# Patient Record
Sex: FEMALE | Race: WHITE | NOT HISPANIC OR LATINO | Employment: UNEMPLOYED | URBAN - NONMETROPOLITAN AREA
[De-identification: names, ages, dates, MRNs, and addresses within clinical notes are randomized per-mention and may not be internally consistent; named-entity substitution may affect disease eponyms.]

---

## 2017-01-03 ENCOUNTER — OFFICE VISIT (OUTPATIENT)
Dept: OBSTETRICS AND GYNECOLOGY | Facility: CLINIC | Age: 22
End: 2017-01-03

## 2017-01-03 VITALS
SYSTOLIC BLOOD PRESSURE: 119 MMHG | BODY MASS INDEX: 34.1 KG/M2 | DIASTOLIC BLOOD PRESSURE: 68 MMHG | HEART RATE: 85 BPM | WEIGHT: 225 LBS | HEIGHT: 68 IN

## 2017-01-03 DIAGNOSIS — E88.81 INSULIN RESISTANCE: Primary | ICD-10-CM

## 2017-01-03 DIAGNOSIS — R63.5 WEIGHT GAIN, ABNORMAL: ICD-10-CM

## 2017-01-03 DIAGNOSIS — N76.1 CHRONIC VAGINITIS: ICD-10-CM

## 2017-01-03 PROCEDURE — 99213 OFFICE O/P EST LOW 20 MIN: CPT | Performed by: NURSE PRACTITIONER

## 2017-01-03 RX ORDER — METFORMIN HYDROCHLORIDE 500 MG/1
500 TABLET, EXTENDED RELEASE ORAL 2 TIMES DAILY
Qty: 60 TABLET | Refills: 3 | Status: SHIPPED | OUTPATIENT
Start: 2017-01-03 | End: 2017-01-05 | Stop reason: SDUPTHER

## 2017-01-03 NOTE — PROGRESS NOTES
"Subjective   Chief Complaint   Patient presents with   • Follow-up     Dilia Ray is a 21 y.o. year old G0 presenting to be seen for evaluation of an abnormal vaginal discharge. The discharge is watery, yellow and malodorous.  Her symptoms have been present for 2 month(s).  Additional she has noticed abnormal menstrual bleeding, local irritation and odor.    She is sexually active.  In the past 12 months there has not been new sexual partners.  Condoms are not typically used.  She would like to be screened for STD's at today's exam.     Prior to the onset of symptoms she was not on systemic antibiotics.  She has recently changed soaps/detergents/toilet tissue.  Prior to this visit, she has used vaginal douching and summer's arlette in an attempt to improve her symptoms.    Patient's last menstrual period was 12/23/2016 (approximate).    Current birth control method: Ortho-Evra patches weekly.    No Additional Complaints Reported    The following portions of the patient's history were reviewed and updated as appropriate:problem list, current medications, allergies, past medical history and past social history    Review of Systems   Constitutional: Positive for activity change, fatigue and unexpected weight change. Negative for appetite change.   Respiratory: Negative for chest tightness and shortness of breath.    Endocrine: Negative.    Genitourinary: Positive for vaginal discharge. Negative for dyspareunia, menstrual problem, pelvic pain and vaginal pain.         Objective   Visit Vitals   • /68   • Pulse 85   • Ht 68\" (172.7 cm)   • Wt 225 lb (102 kg)   • LMP 12/23/2016 (Approximate)   • BMI 34.21 kg/m2       General:  well developed; well nourished  no acute distress   Skin:  No suspicious lesions seen   Pelvis: External genitalia:  normal appearance of the external genitalia including Bartholin's and [place]rec[place]'s glands  :  urethral meatus normal and urethral hypermobility is absent  Vaginal:  normal " pink mucosa without prolapse or lesions, discharge present -  watery and bloody and blood present -  dark red and small amount  Cervix:  normal appearance and blood is seen coming from external cervical os; ONE SWAB OBTAINED  Uterus:  normal size, shape and consistency and tenderness to palpation is present  Adnexa:  normal bimanual exam of the adnexa  Pelvic floor pain: pelvic floor is nontender to palpation in 4 quadrants.     Lab Review   CBC, CMP, TSH and T3, T4, DHEAS and insulin    Imaging   No data reviewed         Diagnoses and all orders for this visit:    Insulin resistance    Weight gain, abnormal    Chronic vaginitis    Other orders  -     Discontinue: metFORMIN XR (GLUCOPHAGE-XR) 500 MG 24 hr tablet; Take 1 tablet by mouth 2 (Two) Times a Day.  -     norelgestromin-ethinyl estradiol (ORTHO EVRA) 150-35 MCG/24HR; Place 1 patch on the skin 1 (One) Time Per Week.      New Medications Ordered This Visit   Medications   • norelgestromin-ethinyl estradiol (ORTHO EVRA) 150-35 MCG/24HR     Sig: Place 1 patch on the skin 1 (One) Time Per Week.     Dispense:  3 patch     Refill:  3     Discussed weight loss strategies, diet and exercise. Will f/u in 3 months.    This note was electronically signed.    Zahraa Sanchez, KIM  January 23, 2017

## 2017-01-03 NOTE — MR AVS SNAPSHOT
Dilia Ray   1/3/2017 1:30 PM   Office Visit    Dept Phone:  [phone]   Encounter #:  [id number]    Provider:  KIM Lobato   Department:  [facility] OB [facility]                Your Full Care Plan              Today's Medication Changes          These changes are accurate as of: 1/3/17  2:20 PM.  If you have any questions, ask your nurse or doctor.               New Medication(s)Ordered:     metFORMIN  MG 24 hr tablet   Commonly known as:  GLUCOPHAGE-XR   Take 1 tablet by mouth 2 (Two) Times a Day.         Stop taking medication(s)listed here:     levonorgestrel 0.75 MG tablet   Commonly known as:  PLAN B           VYVANSE 60 MG capsule   Generic drug:  lisdexamfetamine                Where to Get Your Medications      These medications were sent to [facility]/pharmacy #[id number] [address] [phone]  - [phone] [address] [address]     Phone:  [phone]     norelgestromin-ethinyl estradiol 150-35 MCG/24HR         These medications were sent to [facility]/pharmacy #[id number] - [address] [phone]  - [phone] [place] [id number]     Phone:  [phone]     metFORMIN  MG 24 hr tablet                  Your Updated Medication List          This list is accurate as of: 1/3/17  2:20 PM.  Always use your most recent med list.                metFORMIN  MG 24 hr tablet   Commonly known as:  GLUCOPHAGE-XR   Take 1 tablet by mouth 2 (Two) Times a Day.       norelgestromin-ethinyl estradiol 150-35 MCG/24HR   Commonly known as:  ORTHO EVRA   Place 1 patch on the skin 1 (One) Time Per Week.               Instructions     None    Patient Instructions History      Upcoming Appointments     Visit Type Date Time Department    OFFICE VISIT 1/3/2017  1:30 PM [facility]    OFFICE VISIT 3/29/2017  3:00 PM [facility]      MyChart Signup     Our records  "indicate that you have declined [facility] cafe[employer]hart signup. If you would like to sign up for [employer], please email [email or web address] or call [phone] to obtain an activation code.             Other Info from Your Visit           Your Appointments     Mar 29, 2017  3:00 PM CDT   Office Visit with KIM Lobato   [facility] (--)    [address] [place] [address] Dr  [facility] [address] [place] [id number] [phone]           Arrive 15 minutes prior to appointment.              Allergies     No Known Allergies      Reason for Visit     Follow-up           Vital Signs     Blood Pressure Pulse Height Weight Last Menstrual Period Body Mass Index    119/68 (73 %/ 58 %)* 85 68\" (172.7 cm) (93 %, Z= 1.46)† 225 lb (102 kg) (99 %, Z= 2.26)† 12/23/2016 (Approximate) 34.21 kg/m2 (97 %, Z= 1.87)†    Smoking Status                   Never Assessed         *BP percentiles are based on NHBPEP's 4th Report    †Growth percentiles are based on CDC 2-20 Years data.        "

## 2017-01-05 RX ORDER — METFORMIN HYDROCHLORIDE 500 MG/1
500 TABLET, EXTENDED RELEASE ORAL 2 TIMES DAILY
Qty: 60 TABLET | Refills: 3 | Status: SHIPPED | OUTPATIENT
Start: 2017-01-05 | End: 2017-03-29 | Stop reason: SDUPTHER

## 2017-01-10 ENCOUNTER — TELEPHONE (OUTPATIENT)
Dept: OBSTETRICS AND GYNECOLOGY | Facility: CLINIC | Age: 22
End: 2017-01-10

## 2017-01-10 RX ORDER — DOXYCYCLINE HYCLATE 100 MG/1
100 CAPSULE ORAL 2 TIMES DAILY
Qty: 14 CAPSULE | Refills: 0 | Status: SHIPPED | OUTPATIENT
Start: 2017-01-10 | End: 2017-01-17

## 2017-01-10 RX ORDER — METRONIDAZOLE 500 MG/1
500 TABLET ORAL 2 TIMES DAILY
Qty: 20 TABLET | Refills: 0 | Status: SHIPPED | OUTPATIENT
Start: 2017-01-10 | End: 2017-01-20

## 2017-01-10 RX ORDER — FLUCONAZOLE 150 MG/1
TABLET ORAL
Qty: 3 TABLET | Refills: 0 | Status: SHIPPED | OUTPATIENT
Start: 2017-01-10 | End: 2017-03-29

## 2017-01-10 NOTE — TELEPHONE ENCOUNTER
+ for all bacteria that cause BV & mycoplasma. Needs flagyl 500mg po bid x10 days & doxycycline 100mg po bid x7 days, diflucan 150mg po every 4 days, #3 and she needs to start an OTC probiotic b/c she has no healthy bacteria/yeast at this time.

## 2017-01-11 ENCOUNTER — HOSPITAL ENCOUNTER (OUTPATIENT)
Dept: OTHER | Facility: HOSPITAL | Age: 22
Discharge: HOME | End: 2017-01-11
Attending: NURSE PRACTITIONER | Admitting: NURSE PRACTITIONER

## 2017-03-29 ENCOUNTER — OFFICE VISIT (OUTPATIENT)
Dept: OBSTETRICS AND GYNECOLOGY | Facility: CLINIC | Age: 22
End: 2017-03-29

## 2017-03-29 ENCOUNTER — APPOINTMENT (OUTPATIENT)
Dept: LAB | Facility: HOSPITAL | Age: 22
End: 2017-03-29

## 2017-03-29 VITALS
HEART RATE: 80 BPM | WEIGHT: 225 LBS | HEIGHT: 68 IN | BODY MASS INDEX: 34.1 KG/M2 | SYSTOLIC BLOOD PRESSURE: 108 MMHG | DIASTOLIC BLOOD PRESSURE: 58 MMHG

## 2017-03-29 DIAGNOSIS — E16.1 HYPERINSULINISM SYNDROME: Primary | ICD-10-CM

## 2017-03-29 DIAGNOSIS — Z30.011 ENCOUNTER FOR INITIAL PRESCRIPTION OF CONTRACEPTIVE PILLS: ICD-10-CM

## 2017-03-29 PROCEDURE — 83525 ASSAY OF INSULIN: CPT | Performed by: NURSE PRACTITIONER

## 2017-03-29 PROCEDURE — 99213 OFFICE O/P EST LOW 20 MIN: CPT | Performed by: NURSE PRACTITIONER

## 2017-03-29 PROCEDURE — 36415 COLL VENOUS BLD VENIPUNCTURE: CPT | Performed by: NURSE PRACTITIONER

## 2017-03-29 RX ORDER — NORETHINDRONE ACETATE AND ETHINYL ESTRADIOL 1; .02 MG/1; MG/1
1 TABLET ORAL DAILY
Qty: 28 TABLET | Refills: 12 | Status: SHIPPED | OUTPATIENT
Start: 2017-03-29 | End: 2017-12-13 | Stop reason: SDUPTHER

## 2017-03-29 RX ORDER — METFORMIN HYDROCHLORIDE 500 MG/1
500 TABLET, EXTENDED RELEASE ORAL 2 TIMES DAILY
Qty: 60 TABLET | Refills: 12 | Status: SHIPPED | OUTPATIENT
Start: 2017-03-29 | End: 2018-04-22 | Stop reason: SDUPTHER

## 2017-03-31 PROBLEM — Z30.011 ENCOUNTER FOR INITIAL PRESCRIPTION OF CONTRACEPTIVE PILLS: Status: ACTIVE | Noted: 2017-03-31

## 2017-03-31 PROBLEM — N76.1 CHRONIC VAGINITIS: Status: RESOLVED | Noted: 2017-01-03 | Resolved: 2017-03-31

## 2017-03-31 LAB — INSULIN SERPL-ACNC: 16.9 UIU/ML (ref 2.6–24.9)

## 2017-03-31 NOTE — PROGRESS NOTES
Subjective   Dilia Ray is a 21 y.o. female. G0 here for 3 month follow up on Xulane patches. She reports that they are not sticking to her very well and she wants to change to the pills.     Gynecologic Exam   The patient's pertinent negatives include no genital itching, genital lesions, genital odor, genital rash, missed menses, pelvic pain, vaginal bleeding or vaginal discharge. Pertinent negatives include no abdominal pain, dysuria, headaches, nausea, rash or vomiting. She is sexually active. No, her partner does not have an STD. She uses the rhythm method for contraception. Her menstrual history has been regular.   LMP- 10 days ago     Her weight has remained stable since starting metformin but she hasn't been actively trying to improve her diet.    The following portions of the patient's history were reviewed and updated as appropriate: allergies, current medications, past family history, past medical history, past social history, past surgical history and problem list.    Review of Systems   Constitutional: Negative for activity change, appetite change, fatigue and unexpected weight change.   Respiratory: Negative for apnea, chest tightness and shortness of breath.    Cardiovascular: Negative for chest pain, palpitations and leg swelling.   Gastrointestinal: Negative for abdominal distention, abdominal pain, nausea and vomiting.   Genitourinary: Negative for dyspareunia, dysuria, genital sores, menstrual problem, missed menses, pelvic pain, vaginal bleeding, vaginal discharge and vaginal pain.   Skin: Negative for color change and rash.   Neurological: Negative for light-headedness and headaches.       Objective   Physical Exam   Constitutional: She is oriented to person, place, and time. She appears well-developed and well-nourished.   Cardiovascular: Normal rate, regular rhythm, normal heart sounds and intact distal pulses.    Pulmonary/Chest: Effort normal and breath sounds normal.   Neurological:  She is alert and oriented to person, place, and time.   Skin: Skin is warm and dry.   Psychiatric: She has a normal mood and affect. Her behavior is normal.   Nursing note and vitals reviewed.      Assessment/Plan   Dilia was seen today for follow-up.    Diagnoses and all orders for this visit:    Hyperinsulinism syndrome  -     Insulin, Total    Encounter for initial prescription of contraceptive pills    Other orders  -     metFORMIN ER (GLUCOPHAGE-XR) 500 MG 24 hr tablet; Take 1 tablet by mouth 2 (Two) Times a Day.  -     norethindrone-ethinyl estradiol (LOESTRIN 1/20, 21,) 1-20 MG-MCG per tablet; Take 1 tablet by mouth Daily.       Continue metformin BID & check insulin level today; even if normal, will still help her to lose weight.  Reviewed important dietary changes she needs to make.  Quick start OCPs and take 2 per day until she catches up to the correct day of the week. If she doesn't have a period at the end of the pack she needs to take a pregnancy test. Still needs to use condoms x1 month at least. F/U in 1 year for refills or sooner if needed.

## 2017-04-03 ENCOUNTER — TELEPHONE (OUTPATIENT)
Dept: OBSTETRICS AND GYNECOLOGY | Facility: CLINIC | Age: 22
End: 2017-04-03

## 2017-04-03 NOTE — TELEPHONE ENCOUNTER
----- Message from KIM Lobato sent at 3/31/2017  1:18 PM CDT -----  Insulin is within a normal range again but she still needs to continue the metformin and modify her diet in order for it to help with weight loss.

## 2017-05-27 ENCOUNTER — HOSPITAL ENCOUNTER (EMERGENCY)
Facility: HOSPITAL | Age: 22
Discharge: HOME | End: 2017-05-27
Attending: EMERGENCY MEDICINE | Admitting: EMERGENCY MEDICINE

## 2017-05-27 ENCOUNTER — APPOINTMENT (OUTPATIENT)
Dept: GENERAL RADIOLOGY | Facility: HOSPITAL | Age: 22
End: 2017-05-27

## 2017-05-27 VITALS
TEMPERATURE: 98 F | BODY MASS INDEX: 33.34 KG/M2 | SYSTOLIC BLOOD PRESSURE: 134 MMHG | WEIGHT: 220 LBS | HEART RATE: 60 BPM | DIASTOLIC BLOOD PRESSURE: 69 MMHG | RESPIRATION RATE: 16 BRPM | HEIGHT: 68 IN | OXYGEN SATURATION: 97 %

## 2017-05-27 DIAGNOSIS — T14.8XXA CRUSH INJURY: Primary | ICD-10-CM

## 2017-05-27 PROCEDURE — 99283 EMERGENCY DEPT VISIT LOW MDM: CPT

## 2017-05-27 PROCEDURE — 73130 X-RAY EXAM OF HAND: CPT

## 2017-05-27 RX ORDER — NAPROXEN 500 MG/1
500 TABLET ORAL 2 TIMES DAILY WITH MEALS
Qty: 10 TABLET | Refills: 0 | Status: SHIPPED | OUTPATIENT
Start: 2017-05-27 | End: 2018-06-12

## 2017-12-13 RX ORDER — NORETHINDRONE ACETATE AND ETHINYL ESTRADIOL 1; 20 MG/1; UG/1
TABLET ORAL
Qty: 21 TABLET | Refills: 4 | Status: SHIPPED | OUTPATIENT
Start: 2017-12-13 | End: 2018-01-03

## 2017-12-20 ENCOUNTER — OFFICE VISIT (OUTPATIENT)
Dept: OBSTETRICS AND GYNECOLOGY | Facility: CLINIC | Age: 22
End: 2017-12-20

## 2017-12-20 VITALS
SYSTOLIC BLOOD PRESSURE: 112 MMHG | WEIGHT: 239 LBS | DIASTOLIC BLOOD PRESSURE: 68 MMHG | HEIGHT: 68 IN | BODY MASS INDEX: 36.22 KG/M2

## 2017-12-20 DIAGNOSIS — E88.81 INSULIN RESISTANCE: Primary | ICD-10-CM

## 2017-12-20 DIAGNOSIS — Z30.09 GENERAL COUNSELING AND ADVICE FOR CONTRACEPTIVE MANAGEMENT: ICD-10-CM

## 2017-12-20 PROCEDURE — 99213 OFFICE O/P EST LOW 20 MIN: CPT | Performed by: NURSE PRACTITIONER

## 2017-12-21 PROBLEM — Z30.011 ENCOUNTER FOR INITIAL PRESCRIPTION OF CONTRACEPTIVE PILLS: Status: RESOLVED | Noted: 2017-03-31 | Resolved: 2017-12-21

## 2017-12-21 NOTE — PROGRESS NOTES
Subjective   Dilia Ray is a 22 y.o. female. F/U on metformin; has been taking since March for insulin resistance. Stopped OCPs b/c she kept forgetting. Wants to get Nexplanon again.    HPI Comments: LMP- 12/1      Gynecologic Exam   The patient's pertinent negatives include no genital itching, genital lesions, genital odor, genital rash, missed menses, pelvic pain, vaginal bleeding or vaginal discharge. Pertinent negatives include no abdominal pain, constipation, diarrhea, dysuria, headaches, nausea, rash or vomiting. She is sexually active. No, her partner does not have an STD. She uses nothing for contraception. Her menstrual history has been irregular (28-40 days).     19lb weight gain since March. Reports that she has not changed her diet and does not exercise regularly. Wants to start taking phentermine to help her lose weight.    The following portions of the patient's history were reviewed and updated as appropriate: allergies, current medications, past family history, past medical history, past social history, past surgical history and problem list.    Review of Systems   Constitutional: Positive for unexpected weight change. Negative for activity change, appetite change and fatigue.   Respiratory: Negative for chest tightness and shortness of breath.    Cardiovascular: Negative for chest pain and palpitations.   Gastrointestinal: Negative for abdominal distention, abdominal pain, constipation, diarrhea, nausea and vomiting.   Genitourinary: Positive for menstrual problem. Negative for difficulty urinating, dyspareunia, dysuria, missed menses, pelvic pain, vaginal bleeding, vaginal discharge and vaginal pain.   Musculoskeletal: Negative for myalgias.   Skin: Negative for color change, pallor and rash.   Neurological: Negative for light-headedness and headaches.   Hematological: Negative for adenopathy.   Psychiatric/Behavioral: Negative for dysphoric mood and sleep disturbance. The patient is not  nervous/anxious.        Objective   Physical Exam   Constitutional: She is oriented to person, place, and time. She appears well-developed and well-nourished.   Cardiovascular: Normal rate, regular rhythm, normal heart sounds and intact distal pulses.    Pulmonary/Chest: Effort normal and breath sounds normal.   Neurological: She is alert and oriented to person, place, and time.   Skin: Skin is warm and dry.   Psychiatric: She has a normal mood and affect. Her behavior is normal.   Nursing note and vitals reviewed.      Assessment/Plan   Dilia was seen today for contraception.    Diagnoses and all orders for this visit:    Insulin resistance    General counseling and advice for contraceptive management       Continue metformin for now. We briefly discussed phentermine; it will not help her lose weight successfully if she does not change her diet and exercise. She reportedly drinks whisky and vodka several times per week; continuing this will also make it difficult to lose weight. She will return with her next period to have a Nexplanon placed. We can discuss phentermine in more detail at that time.

## 2018-01-03 ENCOUNTER — PROCEDURE VISIT (OUTPATIENT)
Dept: OBSTETRICS AND GYNECOLOGY | Facility: CLINIC | Age: 23
End: 2018-01-03

## 2018-01-03 VITALS
DIASTOLIC BLOOD PRESSURE: 76 MMHG | WEIGHT: 242 LBS | SYSTOLIC BLOOD PRESSURE: 119 MMHG | HEIGHT: 67 IN | BODY MASS INDEX: 37.98 KG/M2

## 2018-01-03 DIAGNOSIS — Z30.017 NEXPLANON INSERTION: Primary | ICD-10-CM

## 2018-01-03 PROCEDURE — 11981 INSERTION DRUG DLVR IMPLANT: CPT | Performed by: NURSE PRACTITIONER

## 2018-01-03 NOTE — PROGRESS NOTES
Nexplanon Insertion    Patient's last menstrual period was 01/02/2018 (exact date).    Date of procedure:  1/3/2018    Risks and benefits discussed? yes  All questions answered? yes  Consents given by the patient  Written consent obtained? yes    Local anesthesia used:  yes - 3 cc's of  Meds; anesthesia local: 1% lidocaine    Procedure documentation:    The upper left arm (dominant per pt request) was marked at the intended site of insertion. The skin was cleansed with an antiseptic solution.  Local anesthesia was injected.  The Nexplanon was placed subdermally without difficulty.  The devise was able to be palpated in the arm by both myself and Dilia.  The site was cleansed then a 4x4 clean gauze was place over the site of insertion and wrapped with gauze.     She tolerated the procedure well.  There were no complications.  EBL was minimal.    Post procedure instructions: Remove the wrapping in 24 hours and cover with a  band aid if still open.    Follow up needed: PRN    This note was electronically signed.    Zahraa Sancehz, KIM  January 3, 2018

## 2018-04-23 RX ORDER — METFORMIN HYDROCHLORIDE 500 MG/1
500 TABLET, EXTENDED RELEASE ORAL
Qty: 60 TABLET | Refills: 12 | Status: SHIPPED | OUTPATIENT
Start: 2018-04-23 | End: 2018-09-13 | Stop reason: SDUPTHER

## 2018-05-02 ENCOUNTER — OFFICE VISIT (OUTPATIENT)
Dept: ORTHOPEDIC SURGERY | Facility: CLINIC | Age: 23
End: 2018-05-02

## 2018-05-02 VITALS — HEIGHT: 68 IN | BODY MASS INDEX: 36.53 KG/M2 | WEIGHT: 241 LBS

## 2018-05-02 DIAGNOSIS — M25.531 RIGHT WRIST PAIN: Primary | ICD-10-CM

## 2018-05-02 DIAGNOSIS — S62.024A CLOSED NONDISPLACED FRACTURE OF MIDDLE THIRD OF SCAPHOID BONE OF RIGHT WRIST, INITIAL ENCOUNTER: ICD-10-CM

## 2018-05-02 PROBLEM — S62.001A FRACTURE OF SCAPHOID OF RIGHT WRIST: Status: ACTIVE | Noted: 2018-05-02

## 2018-05-02 PROCEDURE — 99203 OFFICE O/P NEW LOW 30 MIN: CPT | Performed by: ORTHOPAEDIC SURGERY

## 2018-05-02 PROCEDURE — 25622 CLTX CARPL SCPHD FX W/O MNPJ: CPT | Performed by: ORTHOPAEDIC SURGERY

## 2018-05-02 RX ORDER — HYDROCODONE BITARTRATE AND ACETAMINOPHEN 5; 325 MG/1; MG/1
1 TABLET ORAL EVERY 4 HOURS PRN
Qty: 30 TABLET | Refills: 0 | Status: SHIPPED | OUTPATIENT
Start: 2018-05-02 | End: 2018-06-12

## 2018-05-02 NOTE — PROGRESS NOTES
Dilia Ray is a 22 y.o. female   Primary provider:  KIM Claire       Chief Complaint   Patient presents with   • Right Wrist - Consult       HISTORY OF PRESENT ILLNESS: Patient is here for consult- RT wrist injury. Patient was referred to our office by KIM Claire. Date of injury occurred 4/30/2018. Patient states that she fell while at home which caused injury. Patient was transported to [facility] first for medical treatment. [facility] obtained xray's and placed wrist in splint. Patient has brought xray disc for review during today's office visit. Patient states the pain is constant. Patient was given a narcotic pain medication prescription at the hospital but states she lost it. She has been using Naproxen for pain relief but states it does not help. No history of prior injury.    History of Present Illness     CONCURRENT MEDICAL HISTORY:    Past Medical History:   Diagnosis Date   • Acne vulgaris    • Allergic rhinitis    • Asthma    • Attention deficit hyperactivity disorder    • Conductive hearing loss     bilateral     • Dysfunction of eustachian tube    • Elbow fracture, left    • Encounter for routine gynecological examination    • Epistaxis    • Hand pain     right contusion      • Headache    • Herpetic maría    • High risk sexual behavior    • History of respiratory therapy 09/21/2011    Nebulizer Treatment 98024 (1) - DANI Johnson   • Irregular periods    • Laceration of foot    • Malaise and fatigue    • Mallet finger    • Myopia    • Nausea and vomiting    • Otalgia    • Otitis media    • Pain in wrist    • Pediculosis capitis    • Sprain of foot, left    • Syncope, near    • Upper respiratory infection    • Verruca plantaris     right great toe          No Known Allergies      Current Outpatient Prescriptions:   •  metFORMIN ER (GLUCOPHAGE-XR) 500 MG 24 hr tablet, TAKE 1 TABLET BY MOUTH 2 (TWO) TIMES A DAY., Disp: 60 tablet, Rfl: 12  •  naproxen  "(NAPROSYN) 500 MG tablet, Take 1 tablet by mouth 2 (Two) Times a Day With Meals., Disp: 10 tablet, Rfl: 0    Current Facility-Administered Medications:   •  Etonogestrel (NEXPLANON) 68 MG subdermal implant, , Intradermal, Continuous, KIM Lobato    Past Surgical History:   Procedure Laterality Date   • ADENOIDECTOMY  04/01/2004   • CAUTERIZATION NASAL BLEEDERS  04/01/2004    CAUTERIZATION INNER NOSE 04263 (1)   • OTHER SURGICAL HISTORY  04/01/2004    INCISION OF EARDRUM GENERAL ANESTHETIC 07223 (3) - BILATERAL TUBE IMPLANTS   • TONSILLECTOMY  2001       History reviewed. No pertinent family history.     Social History     Social History   • Marital status: Single     Spouse name: N/A   • Number of children: N/A   • Years of education: N/A     Occupational History   • Not on file.     Social History Main Topics   • Smoking status: Current Every Day Smoker     Packs/day: 1.00     Types: Cigarettes   • Smokeless tobacco: Never Used   • Alcohol use No   • Drug use: No   • Sexual activity: Defer     Other Topics Concern   • Not on file     Social History Narrative   • No narrative on file        Review of Systems   Constitutional: Positive for activity change.   HENT: Negative.    Eyes: Negative.    Respiratory: Negative.    Cardiovascular: Negative.    Gastrointestinal: Negative.    Endocrine: Negative.    Genitourinary: Negative.    Musculoskeletal: Positive for joint swelling.   Skin: Negative.    Allergic/Immunologic: Negative.    Neurological: Negative.    Hematological: Negative.    Psychiatric/Behavioral: Negative.        PHYSICAL EXAMINATION:       Ht 172.7 cm (68\")   Wt 109 kg (241 lb)   BMI 36.64 kg/m²     Physical Exam   Constitutional: She is oriented to person, place, and time. She appears well-developed and well-nourished.   HENT:   Head: Normocephalic and atraumatic.   Eyes: EOM are normal. Pupils are equal, round, and reactive to light.   Neck: Neck supple.   Pulmonary/Chest: Effort normal. "   Musculoskeletal: Normal range of motion.   Neurological: She is alert and oriented to person, place, and time.   Skin: Skin is warm and dry.   Psychiatric: She has a normal mood and affect. Thought content normal.   Vitals reviewed.      GAIT:     [x]  Normal  []  Antalgic    Assistive device: [x]  None  []  Walker     []  Crutches  []  Cane     []  Wheelchair  []  Stretcher    Ortho Exam  Swelling noted of the fingers.  Neurovascularly intact.  Decreased motion secondary to pain.  Tender over the scaphoid tubercle and in the anatomic snuffbox.    No results found.    I reviewed her x-rays 3 views of the wrist that she brings with her and she has essentially a nondisplaced mid waist scaphoid fracture.    ASSESSMENT:    Diagnoses and all orders for this visit:    Right wrist pain  -     HYDROcodone-acetaminophen (NORCO) 5-325 MG per tablet; Take 1 tablet by mouth Every 4 (Four) Hours As Needed for Moderate Pain .    Closed nondisplaced fracture of middle third of scaphoid bone of right wrist, initial encounter          PLAN the fracture is nondisplaced.  It closed.  I've cautioned the patient about delayed union, nonunion.  We will keep her immobilized for at least 6 weeks.  We'll see her back in 3 weeks x-raying arrival in the cast.  A short arm cast is applied.  She'll, sooner with any problems whatsoever.    No Follow-up on file.    Harley Talavera MD

## 2018-05-18 ENCOUNTER — OFFICE VISIT (OUTPATIENT)
Dept: ORTHOPEDIC SURGERY | Facility: CLINIC | Age: 23
End: 2018-05-18

## 2018-05-18 VITALS — WEIGHT: 240 LBS | BODY MASS INDEX: 36.37 KG/M2 | HEIGHT: 68 IN

## 2018-05-18 DIAGNOSIS — S62.024D CLOSED NONDISPLACED FRACTURE OF MIDDLE THIRD OF SCAPHOID OF RIGHT WRIST WITH ROUTINE HEALING, SUBSEQUENT ENCOUNTER: Primary | ICD-10-CM

## 2018-05-18 PROCEDURE — 29085 APPL CAST HAND&LWR FOREARM: CPT | Performed by: NURSE PRACTITIONER

## 2018-05-18 PROCEDURE — 99024 POSTOP FOLLOW-UP VISIT: CPT | Performed by: NURSE PRACTITIONER

## 2018-05-18 NOTE — PROGRESS NOTES
"Dilia Ray is a 22 y.o. female returns for     Chief Complaint   Patient presents with   • Right Wrist - Follow-up   • Cast Problem       HISTORY OF PRESENT ILLNESS:  Patient presents to office for replacement of cast to right arm. Patient states she got her cast wet in a \"swamp\" 2 days ago and removed it herself because it had an odor and was wet. Patient is being treated for right scaphoid fracture. states that she removed her cast 2 days ago after getting cast wet. Initial injury occurred on 4/30/2018 due to a fall. Patient was seen in office on 5/2/2018 per Dr. Talavera with a short arm thumb spica cast placed.      CONCURRENT MEDICAL HISTORY:    Past Medical History:   Diagnosis Date   • Acne vulgaris    • Allergic rhinitis    • Asthma    • Attention deficit hyperactivity disorder    • Conductive hearing loss     bilateral     • Dysfunction of eustachian tube    • Elbow fracture, left    • Encounter for routine gynecological examination    • Epistaxis    • Hand pain     right contusion      • Headache    • Herpetic maría    • High risk sexual behavior    • History of respiratory therapy 09/21/2011    Nebulizer Treatment 88681 (1) - DANI Johnson   • Irregular periods    • Laceration of foot    • Malaise and fatigue    • Mallet finger    • Myopia    • Nausea and vomiting    • Otalgia    • Otitis media    • Pain in wrist    • Pediculosis capitis    • Sprain of foot, left    • Syncope, near    • Upper respiratory infection    • Verruca plantaris     right great toe          No Known Allergies      Current Outpatient Prescriptions:   •  HYDROcodone-acetaminophen (NORCO) 5-325 MG per tablet, Take 1 tablet by mouth Every 4 (Four) Hours As Needed for Moderate Pain ., Disp: 30 tablet, Rfl: 0  •  metFORMIN ER (GLUCOPHAGE-XR) 500 MG 24 hr tablet, TAKE 1 TABLET BY MOUTH 2 (TWO) TIMES A DAY., Disp: 60 tablet, Rfl: 12  •  naproxen (NAPROSYN) 500 MG tablet, Take 1 tablet by mouth 2 (Two) Times a Day With Meals., Disp: " "10 tablet, Rfl: 0    Current Facility-Administered Medications:   •  Etonogestrel (NEXPLANON) 68 MG subdermal implant, , Intradermal, Continuous, KIM Lobato    Past Surgical History:   Procedure Laterality Date   • ADENOIDECTOMY  04/01/2004   • CAUTERIZATION NASAL BLEEDERS  04/01/2004    CAUTERIZATION INNER NOSE 81537 (1)   • OTHER SURGICAL HISTORY  04/01/2004    INCISION OF EARDRUM GENERAL ANESTHETIC 88291 (3) - BILATERAL TUBE IMPLANTS   • TONSILLECTOMY  2001       ROS  No fevers or chills.  No chest pain or shortness of air.  No GI or  disturbances. Right wrist/hand pain.     PHYSICAL EXAMINATION:       Ht 172.7 cm (68\")   Wt 109 kg (240 lb)   BMI 36.49 kg/m²     Physical Exam   Constitutional: She is oriented to person, place, and time. Vital signs are normal. She appears well-developed and well-nourished. She is active and cooperative. She does not appear ill. No distress.   HENT:   Head: Normocephalic.   Pulmonary/Chest: Effort normal. No respiratory distress.   Abdominal: Soft. She exhibits no distension.   Musculoskeletal: She exhibits edema (Mild, right wrist) and tenderness (Snuffbox, right wrist). She exhibits no deformity.   Neurological: She is alert and oriented to person, place, and time. GCS eye subscore is 4. GCS verbal subscore is 5. GCS motor subscore is 6.   Skin: Skin is warm, dry and intact. Capillary refill takes less than 2 seconds. No erythema.   Psychiatric: She has a normal mood and affect. Her speech is normal and behavior is normal. Judgment and thought content normal. Cognition and memory are normal.   Vitals reviewed.      GAIT:     [x]  Normal  []  Antalgic    Assistive device: [x]  None  []  Walker     []  Crutches  []  Cane     []  Wheelchair  []  Stretcher    Right Hand Exam     Tenderness   The patient is experiencing tenderness in the snuff box and radial area.    Range of Motion     Wrist   Extension: abnormal   Flexion: abnormal   Pronation: abnormal   Supination: " abnormal     Other   Erythema: absent  Sensation: normal  Pulse: present    Comments:  Mild swelling of wrist, hand/fingers. Pain with range of motion. Neurovascularly intact. Capillary refill less than 3 seconds. No deformity.       Left Hand Exam     Tenderness   The patient is experiencing no tenderness.         Range of Motion   The patient has normal left wrist ROM.    Other   Erythema: absent  Sensation: normal  Pulse: present          ASSESSMENT:    Diagnoses and all orders for this visit:    Closed nondisplaced fracture of middle third of scaphoid of right wrist with routine healing, subsequent encounter    PLAN    Patient was seen by Dr. Talavera 2 weeks ago on 5/2/2018 for nondisplaced right scaphoid fracture and recommendations for conservative treatment with a cast for immobilization for at least 6 weeks. Patient is here for cast replacement today as she got hers wet in a swamp and removed it herself two days ago. Denies any changes in the two days since her cast has been off. No injury. No increased pain or complications/concerns. A new fiberglass short arm thumb spica cast is applied today. Cast care reviewed with patient. Continue Naproxen and Norco as needed for pain, as previously prescribed. Follow up in 4 weeks for recheck with repeat x-rays at that time out of the cast.    Return in about 4 weeks (around 6/15/2018) for Recheck.      This document has been electronically signed by KIM Hess on May 21, 2018 6:28 PM      KIM Hess

## 2018-06-11 DIAGNOSIS — S62.024D CLOSED NONDISPLACED FRACTURE OF MIDDLE THIRD OF SCAPHOID OF RIGHT WRIST WITH ROUTINE HEALING, SUBSEQUENT ENCOUNTER: Primary | ICD-10-CM

## 2018-06-12 ENCOUNTER — OFFICE VISIT (OUTPATIENT)
Dept: ORTHOPEDIC SURGERY | Facility: CLINIC | Age: 23
End: 2018-06-12

## 2018-06-12 VITALS — BODY MASS INDEX: 36.98 KG/M2 | HEIGHT: 68 IN | WEIGHT: 244 LBS

## 2018-06-12 DIAGNOSIS — S62.024D CLOSED NONDISPLACED FRACTURE OF MIDDLE THIRD OF SCAPHOID OF RIGHT WRIST WITH ROUTINE HEALING, SUBSEQUENT ENCOUNTER: Primary | ICD-10-CM

## 2018-06-12 PROCEDURE — 99024 POSTOP FOLLOW-UP VISIT: CPT | Performed by: NURSE PRACTITIONER

## 2018-06-12 NOTE — PROGRESS NOTES
"The patient is a 22 y.o. female who presents for followup.    Chief Complaint   Patient presents with   • Right Wrist - Follow-up, Fracture       HPI: Patient presents to office for follow up of right scaphoid fracture. Initial injury occurred on 4/30/2018 due to a fall. A short arm thumb spica cast was placed per Dr. Talavera on 5/2/2018. The patient got the cast wet and a new cast was placed on 5/18/2018. Patient reports the last cast loosened and \"came off\" about 2 days after placement. She states she did not follow up for another cast placement because she \"didn't want another cast\". The patient has not immobilized for the past 4 weeks. Patient reports the pain has improved but she has some mild pain and weakness with lifting. X-rays are repeated today.       Current Outpatient Prescriptions:   •  metFORMIN ER (GLUCOPHAGE-XR) 500 MG 24 hr tablet, TAKE 1 TABLET BY MOUTH 2 (TWO) TIMES A DAY., Disp: 60 tablet, Rfl: 12    Current Facility-Administered Medications:   •  Etonogestrel (NEXPLANON) 68 MG subdermal implant, , Intradermal, Continuous, KIM Lobato    No Known Allergies    ROS:  No fevers or chills.  No nausea or vomiting    PHYSICAL EXAM:    Vitals:    06/12/18 1319   Weight: 111 kg (244 lb)   Height: 172.7 cm (68\")       GAIT:     [x]  Normal  []  Antalgic    Assistive device: [x]  None  []  Walker     []  Crutches  []  Cane     []  Wheelchair  []  Stretcher    Patient is awake and alert, answers questions appropriately, and is in no apparent distress.  Right Hand/Wrist Exam   Sensation: Normal    Tenderness   None    Range of Motion   Range of motion is normal right wrist ROM.    Muscle Strength   Wrist Extension:   4/5  Wrist Flexion:       4/5  :                      4/5    Comments:  No swelling. No deformity. No ecchymosis. Skin is intact. Capillary refill is less than 3 seconds.         Xr Wrist 3+ View Right    Result Date: 6/13/2018  Narrative: AP, oblique and lateral views of the " "right wrist reveal no evidence of fracture or dislocation.  Specifically, there is no visible fracture line noted at the scaphoid bone on this exam.  No significant degenerative changes are noted.  Joint spaces are well-maintained.  Soft tissues are unremarkable.  No acute radiologic abnormalities are noted at this time.  No comparison images are available for review.06/13/18 at 3:01 PM by KIM Hess     ASSESSMENT:  Diagnoses and all orders for this visit:    Closed nondisplaced fracture of middle third of scaphoid of right wrist with routine healing, subsequent encounter    PLAN: X-rays of right wrist reviewed today. The scaphoid fracture is not visualized. I do not have the prior images for comparison purposes as it was on a disc from another facility. The patient states her last cast loosened and just \"came off\". The patient did not call the office or present for cast replacement. She states the pain was better and she did not want another cast placed. Discussed with patient and mother today possible complications of nonunion and avascular necrosis of the scaphoid bone if not treated/immobilized properly. Recommend thumb spica Exosplint. Compliance with splint may continue to be an issue since she did not comply with casting. Patient is strongly encouraged to wear the splint. Recommend Tylenol or Ibuprofen as needed for pain. Follow up in 4 weeks for recheck and repeat x-rays at that time.     Return in about 4 weeks (around 7/10/2018) for Recheck.      This document has been electronically signed by KIM Hess on June 13, 2018 7:52 PM      KIM Hess  "

## 2018-07-12 DIAGNOSIS — S62.024D CLOSED NONDISPLACED FRACTURE OF MIDDLE THIRD OF SCAPHOID OF RIGHT WRIST WITH ROUTINE HEALING, SUBSEQUENT ENCOUNTER: Primary | ICD-10-CM

## 2018-09-13 ENCOUNTER — OFFICE VISIT (OUTPATIENT)
Dept: OBSTETRICS AND GYNECOLOGY | Facility: CLINIC | Age: 23
End: 2018-09-13

## 2018-09-13 VITALS
BODY MASS INDEX: 38.19 KG/M2 | HEIGHT: 68 IN | WEIGHT: 252 LBS | DIASTOLIC BLOOD PRESSURE: 70 MMHG | SYSTOLIC BLOOD PRESSURE: 120 MMHG

## 2018-09-13 DIAGNOSIS — Z11.3 SCREEN FOR SEXUALLY TRANSMITTED DISEASES: ICD-10-CM

## 2018-09-13 DIAGNOSIS — E88.81 INSULIN RESISTANCE: Primary | ICD-10-CM

## 2018-09-13 DIAGNOSIS — E28.2 PCOS (POLYCYSTIC OVARIAN SYNDROME): ICD-10-CM

## 2018-09-13 PROCEDURE — 87591 N.GONORRHOEAE DNA AMP PROB: CPT | Performed by: NURSE PRACTITIONER

## 2018-09-13 PROCEDURE — 99213 OFFICE O/P EST LOW 20 MIN: CPT | Performed by: NURSE PRACTITIONER

## 2018-09-13 PROCEDURE — 87491 CHLMYD TRACH DNA AMP PROBE: CPT | Performed by: NURSE PRACTITIONER

## 2018-09-13 PROCEDURE — 87661 TRICHOMONAS VAGINALIS AMPLIF: CPT | Performed by: NURSE PRACTITIONER

## 2018-09-13 RX ORDER — CITALOPRAM 10 MG/1
TABLET ORAL
Refills: 1 | COMMUNITY
Start: 2018-08-16 | End: 2020-01-09

## 2018-09-13 RX ORDER — METFORMIN HYDROCHLORIDE EXTENDED-RELEASE TABLETS 1000 MG/1
1000 TABLET, FILM COATED, EXTENDED RELEASE ORAL 2 TIMES DAILY
Qty: 60 TABLET | Refills: 12 | Status: SHIPPED | OUTPATIENT
Start: 2018-09-13 | End: 2020-01-09

## 2018-09-13 NOTE — PROGRESS NOTES
Subjective   Dilia Ray is a 23 y.o. Here for refills on metformin and requests STD screening. Needs a pap smear but states that she is menstruating and doesn't want to do that today.    LMP- 9/10/18; hasn't been having much bleeding since Nexplanon was placed on 1/3/18.  Taking metformin 500mg BID and states that she is tolerating it well; still gaining weight though. Not exercising and not eating right.     Gynecologic Exam   The patient's pertinent negatives include no genital itching, genital lesions, genital odor, genital rash, pelvic pain, vaginal bleeding or vaginal discharge. Pertinent negatives include no abdominal pain, diarrhea, dysuria, frequency, headaches, nausea, urgency or vomiting. She is sexually active. It is possible that her partner has an STD. Contraceptive use: Nexplanon. Her menstrual history has been irregular. Her past medical history is significant for menorrhagia, ovarian cysts and vaginosis. There is no history of an abdominal surgery, a  section, an ectopic pregnancy, endometriosis, a gynecological surgery, herpes simplex, metrorrhagia, miscarriage, perineal abscess, PID, an STD or a terminated pregnancy.       The following portions of the patient's history were reviewed and updated as appropriate: allergies, current medications, past social history, past surgical history and problem list.    Review of Systems   Constitutional: Negative for activity change, appetite change, diaphoresis, fatigue and unexpected weight change.   Respiratory: Negative for shortness of breath.    Cardiovascular: Negative for palpitations.   Gastrointestinal: Negative for abdominal distention, abdominal pain, diarrhea, nausea and vomiting.   Genitourinary: Positive for menorrhagia. Negative for dyspareunia, dysuria, frequency, genital sores, menstrual problem, pelvic pain, urgency, vaginal bleeding, vaginal discharge and vaginal pain.   Musculoskeletal: Negative for myalgias.   Skin:  Negative for color change and pallor.   Neurological: Negative for light-headedness and headaches.         Objective   Physical Exam   Constitutional: She is oriented to person, place, and time. She appears well-developed and well-nourished. No distress.   Cardiovascular: Normal rate, regular rhythm and normal heart sounds.    Pulmonary/Chest: Effort normal and breath sounds normal.   Abdominal: Soft. Bowel sounds are normal. She exhibits no distension. There is no tenderness.   Neurological: She is alert and oriented to person, place, and time.   Skin: Skin is warm and dry. She is not diaphoretic.   Psychiatric: She has a normal mood and affect. Her behavior is normal.   Nursing note and vitals reviewed.        Assessment/Plan   Dilia was seen today for follow-up and personal problem.    Diagnoses and all orders for this visit:    Insulin resistance    Screen for sexually transmitted diseases  -     Chlamydia trachomatis, Neisseria gonorrhoeae, Trichomonas vaginalis, PCR - Urine, Urine, Clean Catch    PCOS (polycystic ovarian syndrome)    Other orders  -     metFORMIN ER (FORTAMET) 1000 MG (OSM) 24 hr tablet; Take 1 tablet by mouth 2 (Two) Times a Day.    Increased metformin to 1000mg BID; discussed R/B/A and potential s/e. STD screening today; will call with abnormal results only. Needs to schedule a pap smear within the next year.

## 2018-09-14 LAB
C TRACH RRNA CVX QL NAA+PROBE: NEGATIVE
N GONORRHOEA RRNA SPEC QL NAA+PROBE: NEGATIVE
TRICHOMONAS VAGINALIS PCR: NEGATIVE

## 2019-03-05 ENCOUNTER — APPOINTMENT (OUTPATIENT)
Dept: LAB | Facility: HOSPITAL | Age: 24
End: 2019-03-05

## 2019-03-05 ENCOUNTER — PROCEDURE VISIT (OUTPATIENT)
Dept: OBSTETRICS AND GYNECOLOGY | Facility: CLINIC | Age: 24
End: 2019-03-05

## 2019-03-05 VITALS
DIASTOLIC BLOOD PRESSURE: 75 MMHG | HEIGHT: 68 IN | SYSTOLIC BLOOD PRESSURE: 125 MMHG | HEART RATE: 77 BPM | BODY MASS INDEX: 36.83 KG/M2 | WEIGHT: 243 LBS

## 2019-03-05 DIAGNOSIS — Z30.46 SURVEILLANCE OF IMPLANTABLE SUBDERMAL CONTRACEPTIVE: ICD-10-CM

## 2019-03-05 DIAGNOSIS — E28.2 PCOS (POLYCYSTIC OVARIAN SYNDROME): ICD-10-CM

## 2019-03-05 DIAGNOSIS — Z32.00 ENCOUNTER FOR PREGNANCY TEST, RESULT UNKNOWN: Primary | ICD-10-CM

## 2019-03-05 LAB
ALBUMIN SERPL-MCNC: 4.3 G/DL (ref 3.4–4.8)
ALBUMIN/GLOB SERPL: 1.3 G/DL (ref 1.1–1.8)
ALP SERPL-CCNC: 79 U/L (ref 38–126)
ALT SERPL W P-5'-P-CCNC: 14 U/L (ref 9–52)
ANION GAP SERPL CALCULATED.3IONS-SCNC: 8 MMOL/L (ref 5–15)
ARTICHOKE IGE QN: 107 MG/DL (ref 1–129)
AST SERPL-CCNC: 17 U/L (ref 14–36)
BILIRUB SERPL-MCNC: 0.2 MG/DL (ref 0.2–1.3)
BUN BLD-MCNC: 13 MG/DL (ref 7–21)
BUN/CREAT SERPL: 24.1 (ref 7–25)
CALCIUM SPEC-SCNC: 9.3 MG/DL (ref 8.4–10.2)
CHLORIDE SERPL-SCNC: 105 MMOL/L (ref 95–110)
CHOLEST SERPL-MCNC: 158 MG/DL (ref 0–199)
CO2 SERPL-SCNC: 24 MMOL/L (ref 22–31)
CREAT BLD-MCNC: 0.54 MG/DL (ref 0.5–1)
GFR SERPL CREATININE-BSD FRML MDRD: 143 ML/MIN/1.73 (ref 71–165)
GLOBULIN UR ELPH-MCNC: 3.4 GM/DL (ref 2.3–3.5)
GLUCOSE BLD-MCNC: 88 MG/DL (ref 60–100)
HCG INTACT+B SERPL-ACNC: <2.4 MIU/ML
HDLC SERPL-MCNC: 34 MG/DL (ref 60–200)
LDLC/HDLC SERPL: 3.21 {RATIO} (ref 0–3.22)
POTASSIUM BLD-SCNC: 4.1 MMOL/L (ref 3.5–5.1)
PROT SERPL-MCNC: 7.7 G/DL (ref 6.3–8.6)
SODIUM BLD-SCNC: 137 MMOL/L (ref 137–145)
TRIGL SERPL-MCNC: 75 MG/DL (ref 20–199)

## 2019-03-05 PROCEDURE — 80053 COMPREHEN METABOLIC PANEL: CPT | Performed by: NURSE PRACTITIONER

## 2019-03-05 PROCEDURE — 99213 OFFICE O/P EST LOW 20 MIN: CPT | Performed by: NURSE PRACTITIONER

## 2019-03-05 PROCEDURE — 83525 ASSAY OF INSULIN: CPT | Performed by: NURSE PRACTITIONER

## 2019-03-05 PROCEDURE — 84702 CHORIONIC GONADOTROPIN TEST: CPT | Performed by: NURSE PRACTITIONER

## 2019-03-05 PROCEDURE — 36415 COLL VENOUS BLD VENIPUNCTURE: CPT | Performed by: NURSE PRACTITIONER

## 2019-03-05 PROCEDURE — 80061 LIPID PANEL: CPT | Performed by: NURSE PRACTITIONER

## 2019-03-05 NOTE — PROGRESS NOTES
Subjective   Dilia Ray is a 23 y.o. Presents with concerns about having a positive home pregnancy test 3 weeks ago. Considering having her Nexplanon removed even if she isn't already pregnant.    LMP ~4 months ago; very unpredictable with Nexplanon    Last pap- never      Gynecologic Exam   The patient's pertinent negatives include no genital itching, genital lesions, genital odor, genital rash, pelvic pain, vaginal bleeding or vaginal discharge. Pertinent negatives include no abdominal pain, constipation, diarrhea or dysuria. She is sexually active. No, her partner does not have an STD. Contraceptive use: Nexplanon. Her menstrual history has been irregular. Her past medical history is significant for ovarian cysts and vaginosis. There is no history of an abdominal surgery, a  section, an ectopic pregnancy, endometriosis, a gynecological surgery, herpes simplex, menorrhagia, metrorrhagia, miscarriage, perineal abscess, PID, an STD or a terminated pregnancy.       The following portions of the patient's history were reviewed and updated as appropriate: allergies, current medications, past family history, past medical history, past social history, past surgical history and problem list.    Review of Systems   Constitutional: Negative for activity change, appetite change, diaphoresis, fatigue, unexpected weight gain and unexpected weight loss.   Respiratory: Negative for chest tightness and shortness of breath.    Cardiovascular: Negative for chest pain and palpitations.   Gastrointestinal: Negative for abdominal distention, abdominal pain, constipation and diarrhea.   Genitourinary: Negative for breast discharge, decreased libido, dyspareunia, dysuria, menorrhagia, menstrual problem, pelvic pain, vaginal bleeding, vaginal discharge, vaginal pain, breast lump and breast pain.   Musculoskeletal: Negative for myalgias.   Skin: Negative for color change, dry skin and skin lesions.   Neurological:  Negative for light-headedness and headache.   Psychiatric/Behavioral: Negative for agitation, dysphoric mood, sleep disturbance, depressed mood and stress. The patient is not nervous/anxious.        Objective   Physical Exam   Constitutional: She is oriented to person, place, and time. She appears well-developed and well-nourished. No distress.   Cardiovascular: Normal rate.   Pulmonary/Chest: Effort normal.   Neurological: She is alert and oriented to person, place, and time.   Skin: Skin is warm and dry. She is not diaphoretic.   Psychiatric: She has a normal mood and affect. Her behavior is normal.   Nursing note and vitals reviewed.        Assessment/Plan   Dilia was seen today for procedure.    Diagnoses and all orders for this visit:    Encounter for pregnancy test, result unknown  -     hCG, Quantitative, Pregnancy    PCOS (polycystic ovarian syndrome)  -     Insulin, Total  -     Comprehensive Metabolic Panel  -     Lipid Panel    Surveillance of implantable subdermal contraceptive    Labs today to recheck on PCOS status; pt does not have a healthy diet, is not exercising and does not work at this time. 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 Going to speak with her partner more about having her Nexplanon removed. States that she isn't ready to have a child but he really wants one so she's considering it. Will send refills on metformin.

## 2019-03-06 LAB — INSULIN SERPL-ACNC: 17.9 UIU/ML (ref 2.6–24.9)

## 2019-04-04 DIAGNOSIS — R76.11 POSITIVE PPD: Primary | ICD-10-CM

## 2019-05-06 RX ORDER — METFORMIN HYDROCHLORIDE 500 MG/1
TABLET, EXTENDED RELEASE ORAL
Qty: 60 TABLET | Refills: 12 | Status: SHIPPED | OUTPATIENT
Start: 2019-05-06 | End: 2020-01-09

## 2019-09-04 ENCOUNTER — OFFICE VISIT (OUTPATIENT)
Dept: FAMILY MEDICINE CLINIC | Facility: CLINIC | Age: 24
End: 2019-09-04

## 2019-09-04 VITALS
BODY MASS INDEX: 36.29 KG/M2 | HEIGHT: 69 IN | OXYGEN SATURATION: 99 % | TEMPERATURE: 97.8 F | SYSTOLIC BLOOD PRESSURE: 128 MMHG | WEIGHT: 245 LBS | HEART RATE: 89 BPM | DIASTOLIC BLOOD PRESSURE: 78 MMHG

## 2019-09-04 DIAGNOSIS — Z97.5 NEXPLANON IN PLACE: Primary | ICD-10-CM

## 2019-09-04 DIAGNOSIS — R10.84 GENERALIZED ABDOMINAL CRAMPING: ICD-10-CM

## 2019-09-04 PROCEDURE — 99213 OFFICE O/P EST LOW 20 MIN: CPT | Performed by: FAMILY MEDICINE

## 2019-09-04 RX ORDER — NAPROXEN 500 MG/1
500 TABLET ORAL 2 TIMES DAILY WITH MEALS
Qty: 60 TABLET | Refills: 0 | Status: SHIPPED | OUTPATIENT
Start: 2019-09-04 | End: 2019-10-02 | Stop reason: SDUPTHER

## 2019-09-04 NOTE — PROGRESS NOTES
" Subjective   Dilia Ray is a 24 y.o. female.     History of Present Illness     Abdominal cramping.  Believes due to nexplanon.  Has had nexplanon twice.  Cramping so bad, having rectal spasms as well.    Review of Systems   Constitutional: Negative for chills, fatigue and fever.   HENT: Negative for congestion, ear discharge, ear pain, facial swelling, hearing loss, postnasal drip, rhinorrhea, sinus pressure, sore throat, trouble swallowing and voice change.    Eyes: Negative for discharge, redness and visual disturbance.   Respiratory: Negative for cough, chest tightness, shortness of breath and wheezing.    Cardiovascular: Negative for chest pain and palpitations.   Gastrointestinal: Positive for abdominal pain. Negative for blood in stool, constipation, diarrhea, nausea and vomiting.   Endocrine: Negative for polydipsia and polyuria.   Genitourinary: Negative for dysuria, flank pain, hematuria and urgency.   Musculoskeletal: Negative for arthralgias, back pain, joint swelling and myalgias.   Skin: Negative for rash.   Neurological: Negative for dizziness, weakness, numbness and headaches.   Hematological: Negative for adenopathy.   Psychiatric/Behavioral: Negative for confusion and sleep disturbance. The patient is not nervous/anxious.            /78   Pulse 89   Temp 97.8 °F (36.6 °C) (Temporal)   Ht 175.3 cm (69\")   Wt 111 kg (245 lb)   SpO2 99%   BMI 36.18 kg/m²       Objective     Physical Exam   Constitutional: She is oriented to person, place, and time. She appears well-developed and well-nourished.   HENT:   Head: Normocephalic and atraumatic.   Right Ear: External ear normal.   Left Ear: External ear normal.   Nose: Nose normal.   Eyes: Conjunctivae and EOM are normal. Pupils are equal, round, and reactive to light.   Neck: Normal range of motion.   Pulmonary/Chest: Effort normal.   Musculoskeletal: Normal range of motion.   Neurological: She is alert and oriented to person, place, " and time.   Psychiatric: She has a normal mood and affect. Her behavior is normal. Judgment and thought content normal.   Nursing note and vitals reviewed.          PAST MEDICAL HISTORY     Past Medical History:   Diagnosis Date   • Acne vulgaris    • Allergic rhinitis    • Asthma    • Attention deficit hyperactivity disorder    • Conductive hearing loss     bilateral     • Dysfunction of eustachian tube    • Elbow fracture, left    • Encounter for routine gynecological examination    • Epistaxis    • Hand pain     right contusion      • Headache    • Herpetic maría    • High risk sexual behavior    • History of respiratory therapy 09/21/2011    Nebulizer Treatment 10430 (1) - DANI Johnson   • Irregular periods    • Laceration of foot    • Malaise and fatigue    • Mallet finger    • Myopia    • Nausea and vomiting    • Otalgia    • Otitis media    • Pain in wrist    • Pediculosis capitis    • Sprain of foot, left    • Syncope, near    • Upper respiratory infection    • Verruca plantaris     right great toe         PAST SURGICAL HISTORY     Past Surgical History:   Procedure Laterality Date   • ADENOIDECTOMY  04/01/2004   • CAUTERIZATION NASAL BLEEDERS  04/01/2004    CAUTERIZATION INNER NOSE 07124 (1)   • OTHER SURGICAL HISTORY  04/01/2004    INCISION OF EARDRUM GENERAL ANESTHETIC 97874 (3) - BILATERAL TUBE IMPLANTS   • TONSILLECTOMY  2001      SOCIAL HISTORY     Social History     Socioeconomic History   • Marital status: Single     Spouse name: Not on file   • Number of children: Not on file   • Years of education: Not on file   • Highest education level: Not on file   Tobacco Use   • Smoking status: Current Every Day Smoker     Packs/day: 1.00     Types: Cigarettes, Electronic Cigarette   • Smokeless tobacco: Never Used   Substance and Sexual Activity   • Alcohol use: No   • Drug use: No   • Sexual activity: Yes     Partners: Male     Birth control/protection: Implant      ALLERGIES   Patient has no known allergies.    MEDICATIONS     Current Outpatient Medications   Medication Sig Dispense Refill   • metFORMIN ER (FORTAMET) 1000 MG (OSM) 24 hr tablet Take 1 tablet by mouth 2 (Two) Times a Day. 60 tablet 12   • citalopram (CeleXA) 10 MG tablet TAKE 1 TABLET BY MOUTH QHS  1   • metFORMIN ER (GLUCOPHAGE-XR) 500 MG 24 hr tablet TAKE 1 TABLET BY MOUTH 2 (TWO) TIMES A DAY. 60 tablet 12   • naproxen (NAPROSYN) 500 MG tablet Take 1 tablet by mouth 2 (Two) Times a Day With Meals. 60 tablet 0     Current Facility-Administered Medications   Medication Dose Route Frequency Provider Last Rate Last Dose   • Etonogestrel (NEXPLANON) 68 MG subdermal implant   Intradermal Continuous Zahraa Sanchez, KIM            The following portions of the patient's history were reviewed and updated as appropriate: allergies, current medications, past family history, past medical history, past social history, past surgical history and problem list.        Assessment/Plan   Dilia was seen today for contraception.    Diagnoses and all orders for this visit:    Nexplanon in place  -     Ambulatory Referral to Obstetrics / Gynecology    Generalized abdominal cramping  -     Ambulatory Referral to Obstetrics / Gynecology    Other orders  -     naproxen (NAPROSYN) 500 MG tablet; Take 1 tablet by mouth 2 (Two) Times a Day With Meals.      She wanted me to take it out.  Explained I don't do that.                  No Follow-up on file.                  This document has been electronically signed by Collin Johnson MD on September 4, 2019 8:57 AM

## 2019-09-17 ENCOUNTER — PROCEDURE VISIT (OUTPATIENT)
Dept: OBSTETRICS AND GYNECOLOGY | Facility: CLINIC | Age: 24
End: 2019-09-17

## 2019-09-17 ENCOUNTER — APPOINTMENT (OUTPATIENT)
Dept: LAB | Facility: HOSPITAL | Age: 24
End: 2019-09-17

## 2019-09-17 VITALS
HEIGHT: 69 IN | BODY MASS INDEX: 36.73 KG/M2 | DIASTOLIC BLOOD PRESSURE: 75 MMHG | HEART RATE: 80 BPM | SYSTOLIC BLOOD PRESSURE: 119 MMHG | WEIGHT: 248 LBS

## 2019-09-17 DIAGNOSIS — Z11.3 SCREEN FOR STD (SEXUALLY TRANSMITTED DISEASE): ICD-10-CM

## 2019-09-17 DIAGNOSIS — Z30.46 NEXPLANON REMOVAL: Primary | ICD-10-CM

## 2019-09-17 DIAGNOSIS — Z30.011 ENCOUNTER FOR INITIAL PRESCRIPTION OF CONTRACEPTIVE PILLS: ICD-10-CM

## 2019-09-17 PROBLEM — Z30.017 NEXPLANON INSERTION: Status: RESOLVED | Noted: 2018-01-03 | Resolved: 2019-09-17

## 2019-09-17 PROCEDURE — 36415 COLL VENOUS BLD VENIPUNCTURE: CPT | Performed by: NURSE PRACTITIONER

## 2019-09-17 PROCEDURE — G0432 EIA HIV-1/HIV-2 SCREEN: HCPCS | Performed by: NURSE PRACTITIONER

## 2019-09-17 PROCEDURE — 11982 REMOVE DRUG IMPLANT DEVICE: CPT | Performed by: NURSE PRACTITIONER

## 2019-09-17 PROCEDURE — 86803 HEPATITIS C AB TEST: CPT | Performed by: NURSE PRACTITIONER

## 2019-09-17 PROCEDURE — 86592 SYPHILIS TEST NON-TREP QUAL: CPT | Performed by: NURSE PRACTITIONER

## 2019-09-17 RX ORDER — NORETHINDRONE ACETATE AND ETHINYL ESTRADIOL 1MG-20(21)
1 KIT ORAL DAILY
Qty: 28 TABLET | Refills: 12 | Status: SHIPPED | OUTPATIENT
Start: 2019-09-17 | End: 2020-01-09

## 2019-09-17 NOTE — PROGRESS NOTES
Nexplanon Removal    Date of procedure:  9/17/2019    Risks and benefits discussed? yes  All questions answered? yes  Consents given by the patient  Written consent obtained? yes    Local anesthesia used:  yes - 3 cc's of  Meds; anesthesia local: 1% lidocaine with epinephrine    Procedure documentation:    The upper left arm (non-dominant) was marked at the intended site of removal.  The skin was cleansed with an antispetic solution.  Local anesthesia was injected.  A vertical horizontal was created at the distal tip of the implant.  The implant was removed intact without difficulty.  A 4x4 sterile gauze was placed over the incision site and the arm was wrapped with gauze.  She tolerated the procedure well.  There were no complications.  EBL was minimal.    Post procedure instructions: Remove the wrapping in 24 hours and cover with a  band-aid if still open.    Follow up needed: 1 year for BC refills and STD screening        Dilia was seen today for procedure.    Diagnoses and all orders for this visit:    Nexplanon removal    Encounter for initial prescription of contraceptive pills    Screen for STD (sexually transmitted disease)  -     Chlamydia trachomatis, Neisseria gonorrhoeae, Trichomonas vaginalis, PCR - Urine, Urine, Clean Catch    Other orders  -     norethindrone-ethinyl estradiol FE (JUNEL FE 1/20) 1-20 MG-MCG per tablet; Take 1 tablet by mouth Daily.        This note was electronically signed.    Zahraa Sanchez, KIM  September 17, 2019

## 2019-09-18 LAB
HCV AB SER DONR QL: NORMAL
HIV1+2 AB SER QL: NORMAL
RPR SER QL: NORMAL

## 2019-10-02 RX ORDER — NAPROXEN 500 MG/1
TABLET ORAL
Qty: 60 TABLET | Refills: 0 | Status: SHIPPED | OUTPATIENT
Start: 2019-10-02 | End: 2020-01-09

## 2019-11-05 ENCOUNTER — OFFICE VISIT (OUTPATIENT)
Dept: OBSTETRICS AND GYNECOLOGY | Facility: CLINIC | Age: 24
End: 2019-11-05

## 2019-11-05 VITALS
DIASTOLIC BLOOD PRESSURE: 60 MMHG | WEIGHT: 249 LBS | SYSTOLIC BLOOD PRESSURE: 120 MMHG | BODY MASS INDEX: 36.88 KG/M2 | HEIGHT: 69 IN

## 2019-11-05 DIAGNOSIS — Z70.8 ENCOUNTER FOR SEXUALLY TRANSMITTED DISEASE COUNSELING: Primary | ICD-10-CM

## 2019-11-05 PROCEDURE — 99213 OFFICE O/P EST LOW 20 MIN: CPT | Performed by: NURSE PRACTITIONER

## 2019-11-05 NOTE — PROGRESS NOTES
"Dilia was scheduled to have a pap test today and follow up on her OCPs. She notified me that she is not taking her OCPs and that she will not be having a pap test today because she \"don't want everybody looking at my cat!\" She also states that she wants to start on Phentermine for weight loss but that if she can't get it her then she's going to go to [facility] in [place], [place].    Counseling was given to patient and family for the following topics: risk factor reductions and cervical cancer screening and contraception . Total time of the encounter was 15 minutes and 13 minutes was spent counseling. She was adamant that she was not having a pap test today and did not want anything for birth control at this time, despite having the knowledge of Phentermine potentially causing birth defects and pregnancy loss. No medications were prescribed today and she was encouraged to schedule another appointment for a pap test ASAP.    "

## 2020-01-09 ENCOUNTER — APPOINTMENT (OUTPATIENT)
Dept: LAB | Facility: HOSPITAL | Age: 25
End: 2020-01-09

## 2020-01-09 ENCOUNTER — INITIAL PRENATAL (OUTPATIENT)
Dept: OBSTETRICS AND GYNECOLOGY | Facility: CLINIC | Age: 25
End: 2020-01-09

## 2020-01-09 VITALS — DIASTOLIC BLOOD PRESSURE: 62 MMHG | BODY MASS INDEX: 35 KG/M2 | SYSTOLIC BLOOD PRESSURE: 126 MMHG | WEIGHT: 237 LBS

## 2020-01-09 VITALS — WEIGHT: 237 LBS | BODY MASS INDEX: 35 KG/M2 | SYSTOLIC BLOOD PRESSURE: 126 MMHG | DIASTOLIC BLOOD PRESSURE: 62 MMHG

## 2020-01-09 DIAGNOSIS — Z34.00 SUPERVISION OF NORMAL FIRST PREGNANCY, ANTEPARTUM: Primary | ICD-10-CM

## 2020-01-09 DIAGNOSIS — Z3A.01 LESS THAN 8 WEEKS GESTATION OF PREGNANCY: ICD-10-CM

## 2020-01-09 DIAGNOSIS — E28.2 PCOS (POLYCYSTIC OVARIAN SYNDROME): ICD-10-CM

## 2020-01-09 DIAGNOSIS — Z36.87 UNSURE OF LMP (LAST MENSTRUAL PERIOD) AS REASON FOR ULTRASOUND SCAN: Primary | ICD-10-CM

## 2020-01-09 DIAGNOSIS — O99.331 TOBACCO SMOKING AFFECTING PREGNANCY IN FIRST TRIMESTER: ICD-10-CM

## 2020-01-09 DIAGNOSIS — E66.9 OBESITY (BMI 30-39.9): ICD-10-CM

## 2020-01-09 PROBLEM — Z30.011 ENCOUNTER FOR INITIAL PRESCRIPTION OF CONTRACEPTIVE PILLS: Status: RESOLVED | Noted: 2019-09-17 | Resolved: 2020-01-09

## 2020-01-09 PROBLEM — R63.5 WEIGHT GAIN, ABNORMAL: Status: RESOLVED | Noted: 2017-01-03 | Resolved: 2020-01-09

## 2020-01-09 PROBLEM — M25.531 RIGHT WRIST PAIN: Status: RESOLVED | Noted: 2018-05-02 | Resolved: 2020-01-09

## 2020-01-09 PROBLEM — S62.024D: Status: RESOLVED | Noted: 2018-05-02 | Resolved: 2020-01-09

## 2020-01-09 LAB
ABO GROUP BLD: NORMAL
AMPHET+METHAMPHET UR QL: NEGATIVE
AMPHETAMINES UR QL: NEGATIVE
BARBITURATES UR QL SCN: NEGATIVE
BASOPHILS # BLD AUTO: 0.04 10*3/MM3 (ref 0–0.2)
BASOPHILS NFR BLD AUTO: 0.4 % (ref 0–1.5)
BENZODIAZ UR QL SCN: NEGATIVE
BILIRUB UR QL STRIP: NEGATIVE
BLD GP AB SCN SERPL QL: NEGATIVE
BUPRENORPHINE SERPL-MCNC: NEGATIVE NG/ML
CANNABINOIDS SERPL QL: POSITIVE
CLARITY UR: ABNORMAL
COCAINE UR QL: NEGATIVE
COLOR UR: YELLOW
DEPRECATED RDW RBC AUTO: 43.2 FL (ref 37–54)
EOSINOPHIL # BLD AUTO: 0.09 10*3/MM3 (ref 0–0.4)
EOSINOPHIL NFR BLD AUTO: 1 % (ref 0.3–6.2)
ERYTHROCYTE [DISTWIDTH] IN BLOOD BY AUTOMATED COUNT: 13.7 % (ref 12.3–15.4)
GLUCOSE UR STRIP-MCNC: NEGATIVE MG/DL
HBV SURFACE AG SERPL QL IA: NORMAL
HCT VFR BLD AUTO: 37.7 % (ref 34–46.6)
HCV AB SER DONR QL: NORMAL
HGB BLD-MCNC: 13.5 G/DL (ref 12–15.9)
HGB UR QL STRIP.AUTO: NEGATIVE
HIV1+2 AB SER QL: NORMAL
IMM GRANULOCYTES # BLD AUTO: 0.05 10*3/MM3 (ref 0–0.05)
IMM GRANULOCYTES NFR BLD AUTO: 0.5 % (ref 0–0.5)
KETONES UR QL STRIP: NEGATIVE
LEUKOCYTE ESTERASE UR QL STRIP.AUTO: NEGATIVE
LYMPHOCYTES # BLD AUTO: 2.28 10*3/MM3 (ref 0.7–3.1)
LYMPHOCYTES NFR BLD AUTO: 24.5 % (ref 19.6–45.3)
Lab: NORMAL
MCH RBC QN AUTO: 31 PG (ref 26.6–33)
MCHC RBC AUTO-ENTMCNC: 35.8 G/DL (ref 31.5–35.7)
MCV RBC AUTO: 86.5 FL (ref 79–97)
METHADONE UR QL SCN: NEGATIVE
MONOCYTES # BLD AUTO: 0.76 10*3/MM3 (ref 0.1–0.9)
MONOCYTES NFR BLD AUTO: 8.2 % (ref 5–12)
NEUTROPHILS # BLD AUTO: 6.08 10*3/MM3 (ref 1.7–7)
NEUTROPHILS NFR BLD AUTO: 65.4 % (ref 42.7–76)
NITRITE UR QL STRIP: NEGATIVE
NRBC BLD AUTO-RTO: 0 /100 WBC (ref 0–0.2)
OPIATES UR QL: NEGATIVE
OXYCODONE UR QL SCN: NEGATIVE
PCP UR QL SCN: NEGATIVE
PH UR STRIP.AUTO: 7.5 [PH] (ref 5–8)
PLATELET # BLD AUTO: 276 10*3/MM3 (ref 140–450)
PMV BLD AUTO: 9.8 FL (ref 6–12)
PROPOXYPH UR QL: NEGATIVE
PROT UR QL STRIP: NEGATIVE
RBC # BLD AUTO: 4.36 10*6/MM3 (ref 3.77–5.28)
RH BLD: POSITIVE
RPR SER QL: NORMAL
SP GR UR STRIP: 1.02 (ref 1–1.03)
TRICYCLICS UR QL SCN: NEGATIVE
UROBILINOGEN UR QL STRIP: ABNORMAL
WBC NRBC COR # BLD: 9.3 10*3/MM3 (ref 3.4–10.8)

## 2020-01-09 PROCEDURE — 87086 URINE CULTURE/COLONY COUNT: CPT | Performed by: NURSE PRACTITIONER

## 2020-01-09 PROCEDURE — 87491 CHLMYD TRACH DNA AMP PROBE: CPT | Performed by: NURSE PRACTITIONER

## 2020-01-09 PROCEDURE — 80306 DRUG TEST PRSMV INSTRMNT: CPT | Performed by: NURSE PRACTITIONER

## 2020-01-09 PROCEDURE — 87591 N.GONORRHOEAE DNA AMP PROB: CPT | Performed by: NURSE PRACTITIONER

## 2020-01-09 PROCEDURE — G0123 SCREEN CERV/VAG THIN LAYER: HCPCS | Performed by: NURSE PRACTITIONER

## 2020-01-09 PROCEDURE — 99214 OFFICE O/P EST MOD 30 MIN: CPT | Performed by: NURSE PRACTITIONER

## 2020-01-09 PROCEDURE — 80081 OBSTETRIC PANEL INC HIV TSTG: CPT | Performed by: NURSE PRACTITIONER

## 2020-01-09 PROCEDURE — 86803 HEPATITIS C AB TEST: CPT | Performed by: NURSE PRACTITIONER

## 2020-01-09 PROCEDURE — 81003 URINALYSIS AUTO W/O SCOPE: CPT | Performed by: NURSE PRACTITIONER

## 2020-01-09 PROCEDURE — 88141 CYTOPATH C/V INTERPRET: CPT | Performed by: PATHOLOGY

## 2020-01-09 PROCEDURE — 87661 TRICHOMONAS VAGINALIS AMPLIF: CPT | Performed by: NURSE PRACTITIONER

## 2020-01-09 PROCEDURE — 36415 COLL VENOUS BLD VENIPUNCTURE: CPT

## 2020-01-09 RX ORDER — ONDANSETRON 8 MG/1
8 TABLET, ORALLY DISINTEGRATING ORAL EVERY 8 HOURS PRN
Qty: 20 TABLET | Refills: 4 | Status: SHIPPED | OUTPATIENT
Start: 2020-01-09 | End: 2020-03-11 | Stop reason: SDUPTHER

## 2020-01-09 RX ORDER — CALCIUM CARBONATE 300MG(750)
TABLET,CHEWABLE ORAL
COMMUNITY
End: 2020-06-03

## 2020-01-09 RX ORDER — DOCUSATE SODIUM 100 MG/1
100 CAPSULE, LIQUID FILLED ORAL DAILY
Qty: 30 CAPSULE | Refills: 4 | Status: SHIPPED | OUTPATIENT
Start: 2020-01-09 | End: 2020-03-29

## 2020-01-09 NOTE — PROGRESS NOTES
I spent approximately 60 minutes with the patient acquiring the health and history intake and discussing topics related to healthy lifestyle. This is her first pregnancy. Her LMP was sometime in October she believes. She is accompanied today by her boyfriend Calvin and her mother. She has complaints today of nausea and vomiting. She states she also has a vaginal discharge but no odor. She has history of PCOS and was on Metformin. She stopped taking the medicine.  A newob bag is given. The 1st trimester teaching was done with the patient. We discussed a healthy diet and exercise and what is recommended. I also discussed Listeriosis and Toxoplasmosis and what fish to avoid due to high mercury levels. Informed patient not to be in hot tubs, saunas, or tanning beds. She asked could she get in the bathtub and I told her yes.  We discussed that spotting may occur after intercourse which is common, but if heavy bleeding like a period occurs to call the [facility] or hospital if clinic is closed.  I encouraged her to make an appointment with the dentist if she has not had a dental exam and cleaning in the last 6 months. I instructed the patient that alcohol, illicit drug use, and tobacco smoking should be avoided in pregnancy. The patient does smoke but states she is trying to quit. She says she smokes about 3 cigarettes per day. She says she does not drink alcohol now that she is pregnant.  We discussed the hospital policy procedure if a patient has a positive urine drug screen at the time of admission to the hospital. She plans to breastfeed. I gave her pamphlet on breastfeeding classes and the breastfeeding mothers support group that is provided by Annetta Noble, Lactation Consultant. We discussed the resources that is offered at the health departments, and we discussed that some health departments have a breastfeeding peer counselor. I encouraged the HANDS program. Her mother also states she has been trying to get her  daughter to sign up for HANDS. The patient states she is not interested in the program. I informed patient that group prenatal education classes are offered here. I instructed patient to let the provider know if she would like to join a group after EDC is established. Her mother wants her to do Centering Pregnancy. The patient seems interested.  I discussed with the patient that a pediatrician needs to be chosen prior to delivery for the infant to have an appointment scheduled before leaving the hospital.   I discussed lab tests will be done today. Due to her PCOS, an early 1 hr glucola will be ordered for the next visit. The patient was supposed to get her pap smear done in November 2019 but refused. Information is given on the TDAP vaccine.  All questions were answered at this time.

## 2020-01-09 NOTE — PROGRESS NOTES
"[facility]  Obstetrics Visit    CHIEF COMPLAINT:  New prenatal visit    HISTORY OF PRESENT ILLNESS:  Dilia Ray is a 24 y.o. y/o  at 11w2d by LMP (Patient's last menstrual period was 10/22/2019 (approximate).). This was an unplanned pregnancy and the patient is supported by her boyfriend and her mother. Reports daily nausea associated with occasional vomiting. Reports breast tenderness. She denies any vaginal bleeding. She has started taking a prenatal vitamin.      REVIEW OF SYSTEMS  Review of Systems   Constitutional: Negative for activity change, appetite change, fatigue and unexpected weight change.   Respiratory: Negative for chest tightness and shortness of breath.    Cardiovascular: Negative for chest pain, palpitations and leg swelling.   Gastrointestinal: Negative for abdominal distention, abdominal pain, blood in stool, constipation, diarrhea, nausea and vomiting.   Endocrine: Negative for cold intolerance, heat intolerance, polydipsia, polyphagia and polyuria.   Genitourinary: Negative for difficulty urinating, dyspareunia, dysuria, genital sores, menstrual problem, pelvic pain, urgency, vaginal bleeding, vaginal discharge and vaginal pain.   Musculoskeletal: Negative for gait problem and myalgias.   Skin: Negative for color change, pallor and rash.   Neurological: Negative for dizziness, weakness, light-headedness and headaches.   Hematological: Negative for adenopathy.   Psychiatric/Behavioral: Negative for agitation, confusion, dysphoric mood, self-injury and suicidal ideas. The patient is not nervous/anxious.        PRENATAL RISK FACTORS   Problems (from 20 to present)     Problem Noted Resolved    Tobacco smoking affecting pregnancy in first trimester 2020 by Zahraa Sanchez, APRN No    Overview Signed 2020  2:46 PM by Zahraa Sanchez, APRN     3-5 per day \"trying to quit\"               DATING CRITERIA:  LMP (\"\") -- CAN ? 2020  1TUS " scheduled    OBSTETRIC HISTORY:  OB History    Para Term  AB Living   1             SAB TAB Ectopic Molar Multiple Live Births                    # Outcome Date GA Lbr Eliu/2nd Weight Sex Delivery Anes PTL Lv   1 Current              GYN HISTORY:  Denies h/o sexually transmitted infections/pelvic inflammatory disease  Denies h/o abnormal pap smears  Last pap smear:   Last Completed Pap Smear       Status Date      PAP SMEAR Done 11/15/2011 CONVERTED (HISTORICAL) GYN CYTOLOGY        Denies h/o gynecologic surgeries, including biopsies of the cervix    PAST MEDICAL HISTORY:  Past Medical History:   Diagnosis Date   • Acne vulgaris    • Allergic rhinitis    • Anxiety    • Asthma    • Attention deficit hyperactivity disorder    • Chlamydia    • Conductive hearing loss     bilateral     • Depression    • Dysfunction of eustachian tube    • Elbow fracture, left    • Encounter for routine gynecological examination    • Epistaxis    • Gonorrhea    • Hand pain     right contusion      • Headache    • Heart murmur    • Herpetic maría    • High risk sexual behavior    • History of respiratory therapy 2011    Nebulizer Treatment 21634 (1) - DANI Johnson   • Irregular periods    • Laceration of foot    • Malaise and fatigue    • Mallet finger    • Myopia    • Nausea and vomiting    • Otalgia    • Otitis media    • Pain in wrist    • Pediculosis capitis    • Polycystic ovary syndrome    • Scoliosis    • Sprain of foot, left    • Syncope, near    • Upper respiratory infection    • Verruca plantaris     right great toe        PAST SURGICAL HISTORY:  Past Surgical History:   Procedure Laterality Date   • ADENOIDECTOMY  2004   • CAUTERIZATION NASAL BLEEDERS  2004    CAUTERIZATION INNER NOSE 25621 (1)   • OTHER SURGICAL HISTORY  2004    INCISION OF EARDRUM GENERAL ANESTHETIC 11562 (3) - BILATERAL TUBE IMPLANTS   • TONSILLECTOMY     • WISDOM TOOTH EXTRACTION       FAMILY HISTORY:  Family History    Problem Relation Age of Onset   • Hypertension Father    • Supraventricular tachycardia Father    • No Known Problems Brother    • No Known Problems Sister    • Diabetes Paternal Grandfather    • Fibromyalgia Paternal Grandmother    • Breast cancer Maternal Grandmother    • Diabetes Maternal Grandfather    • Prostate cancer Maternal Grandfather    • Stomach cancer Maternal Grandfather      SOCIAL HISTORY:  Social History     Socioeconomic History   • Marital status: Single     Spouse name: Not on file   • Number of children: Not on file   • Years of education: Not on file   • Highest education level: Not on file   Tobacco Use   • Smoking status: Current Every Day Smoker     Packs/day: 0.00     Types: Cigarettes   • Smokeless tobacco: Never Used   • Tobacco comment: 3 cigarettes per day    Substance and Sexual Activity   • Alcohol use: Not Currently   • Drug use: No   • Sexual activity: Yes     Partners: Male     Comment: has not had a pap test      GENETIC SCREENING:  Age >34 yo as of CAN: no  Thalassemia: no  NTD: no  CHD: no  Down Syndrome/MR/Fragile X/Autism: no  Ashkenazi Hinduism with Rigo-Sachs, [place], familial dysautonomia: no  Sickle cell disease or trait: no  Hemophilia: no  Muscular dystrophy: no  Cystic fibrosis: no  Morrill's chorea: no  Birth defects: no  Genetic/chromosomal disorders: no    INFECTION HISTORY:  TB exposure: no  HSV: yes  Illness since LMP: no  Prior GBS infected child: no  STIs: gonorrhea and chlamydia    ALLERGIES:  No Known Allergies  MEDICATIONS:  Prior to Admission medications    Medication Sig Start Date End Date Taking? Authorizing Provider   citalopram (CeleXA) 10 MG tablet TAKE 1 TABLET BY MOUTH QHS 8/16/18   Provider, MD Yamileth   docusate sodium (COLACE) 100 MG capsule Take 1 capsule by mouth Daily. 1/9/20   Zahraa Sanchez APRN   metFORMIN ER (FORTAMET) 1000 MG (OSM) 24 hr tablet Take 1 tablet by mouth 2 (Two) Times a Day. 9/13/18   Zahraa Sanchez APRN   metFORMIN ER  (GLUCOPHAGE-XR) 500 MG 24 hr tablet TAKE 1 TABLET BY MOUTH 2 (TWO) TIMES A DAY. 5/6/19   Zahraa Sanchez APRN   naproxen (NAPROSYN) 500 MG tablet TAKE 1 TABLET BY MOUTH TWICE A DAY WITH MEALS 10/2/19   Collin Johnson MD   norethindrone-ethinyl estradiol FE (JUNEL FE 1/20) 1-20 MG-MCG per tablet Take 1 tablet by mouth Daily. 9/17/19 9/16/20  Zahraa Sanchez APRN   ondansetron ODT (ZOFRAN ODT) 8 MG disintegrating tablet Take 1 tablet by mouth Every 8 (Eight) Hours As Needed for Nausea or Vomiting. 1/9/20   Zahraa Sanchez APRN   Prenatal MV-Min-FA-Omega-3 (PRENATAL GUMMIES/DHA & FA) 0.4-32.5 MG chewable tablet Chew.    Provider, MD Yamileth     PHYSICAL EXAM:   /62   Wt 108 kg (237 lb)   LMP 10/22/2019 (Approximate)   BMI 35.00 kg/m²   General: Alert, healthy, no distress, well nourished and well developed.  Neurologic: Alert, oriented to person, place, and time.  Gait normal.  Cranial nerves II-XII grossly intact.  HEENT: Normocephalic, atraumatic.  Extraocular muscles intact, pupils equal and reactive x2.    Teeth: Normal hygiene.  Neck: Supple, no adenopathy, thyroid normal size, non-tender, without nodularity, trachea midline.  Breasts: No masses, skin dimpling, skin retraction, nipple discharge, or asymmetry bilaterally.  Lungs: Normal respiratory effort.  Clear to auscultation bilaterally.  No wheezes, rhonci, or rales.  Heart: Regular rate and rhythm.  No murmer, rub or gallop.  Abdomen: Soft, non-tender, non-distended,no masses, no hepatosplenomegaly, no hernia.  Skin: No rash, no lesions.  Extremities: No cyanosis, clubbing or edema.  PELVIC EXAM:  External Genitalia/Vulva: Anatomy is normal, no significant redness of labia, no discharge on vulvar tissues, Pace's and Bartholin's glands are normal, no ulcers, no condylomatous lesions.  Urethra: Normal, no lesions.  Vagina: Vaginal tissues are not inflamed, normal color and texture, no significant discharge present.  Cervix: Normal in appearance  without lesions or purulent discharge, no cervical motion tenderness. Pap and gc/ct obtained.  Uterus: not palpable  Adnexa: not palpable    Bedside ultrasound performed by myself which shows the findings below:  Intrauterine gestational sac with visible yolk sac and gestation, not c/w 11 weeks size. FHR of 136bpm.    IMPRESSION:  Dilia Ray is a 24 y.o.  at 11w2d for a new prenatal visit.    PLAN:  1.  IUP at approximately 7 weeks gestation  - Options counseling performed and patient desires continuation of pregnancy to term   - Prenatal labs ordered  - Genetic testing including cystic fibrosis was discussed and patient declined today  - Continue prenatal vitamins  - Weight gain counseling performed.   - BMI <18.5: Recommend 28-40 lb weight gain   - BMI 18.5-24.9: 25-35 lb   - BMI 25-29.9: 15-25 lb   - BMI >30: 11-20 lb  - Return to clinic in 4 weeks for return prenatal visit     Diagnosis Plan   1. Unsure of LMP (last menstrual period) as reason for ultrasound scan  Liquid-based Pap Smear, Screening    Chlamydia trachomatis, Neisseria gonorrhoeae, Trichomonas vaginalis, PCR - Swab, Cervix    US Ob Transvaginal   2. Less than 8 weeks gestation of pregnancy  Liquid-based Pap Smear, Screening    Chlamydia trachomatis, Neisseria gonorrhoeae, Trichomonas vaginalis, PCR - Swab, Cervix   3. Tobacco smoking affecting pregnancy in first trimester       KIM Claire  2020  2:48 PM

## 2020-01-10 LAB
BACTERIA SPEC AEROBE CULT: NORMAL
C TRACH RRNA CVX QL NAA+PROBE: NEGATIVE
N GONORRHOEA RRNA SPEC QL NAA+PROBE: NEGATIVE
RUBV IGG SERPL IA-ACNC: POSITIVE
TRICHOMONAS VAGINALIS PCR: NEGATIVE

## 2020-01-14 ENCOUNTER — TELEPHONE (OUTPATIENT)
Dept: LABOR AND DELIVERY | Facility: HOSPITAL | Age: 25
End: 2020-01-14

## 2020-01-14 LAB
GEN CATEG CVX/VAG CYTO-IMP: ABNORMAL
LAB AP CASE REPORT: ABNORMAL
LAB AP GYN ADDITIONAL INFORMATION: ABNORMAL
LAB AP GYN OTHER FINDINGS: ABNORMAL
PATH INTERP SPEC-IMP: ABNORMAL
STAT OF ADQ CVX/VAG CYTO-IMP: ABNORMAL

## 2020-01-15 NOTE — TELEPHONE ENCOUNTER
Received call from [facility] ED.  Dilia Ray is a 24 y.o.  at 12w0d presenting to their ED with VB (spotting x12h).  VSS and unremarkable per [facility].  Denies recent intercourse.  US appt scheduled for .  [facility] does not have US capabilities.  Recommended that patient keep that appointment for her US in 2 days and she may be discharged from the ED.    Kina Reyez MD  2020  11:08 PM

## 2020-01-16 ENCOUNTER — TELEPHONE (OUTPATIENT)
Dept: OBSTETRICS AND GYNECOLOGY | Facility: CLINIC | Age: 25
End: 2020-01-16

## 2020-01-16 ENCOUNTER — LAB (OUTPATIENT)
Dept: LAB | Facility: HOSPITAL | Age: 25
End: 2020-01-16

## 2020-01-16 DIAGNOSIS — Z34.00 SUPERVISION OF NORMAL FIRST PREGNANCY, ANTEPARTUM: ICD-10-CM

## 2020-01-16 DIAGNOSIS — O36.80X0 PREGNANCY WITH UNCERTAIN FETAL VIABILITY, SINGLE OR UNSPECIFIED FETUS: ICD-10-CM

## 2020-01-16 DIAGNOSIS — E66.9 OBESITY (BMI 30-39.9): ICD-10-CM

## 2020-01-16 DIAGNOSIS — E28.2 PCOS (POLYCYSTIC OVARIAN SYNDROME): ICD-10-CM

## 2020-01-16 DIAGNOSIS — N83.291 COMPLEX CYST OF RIGHT OVARY: Primary | ICD-10-CM

## 2020-01-16 LAB — GLUCOSE 1H P 100 G GLC PO SERPL-MCNC: 110 MG/DL (ref 60–140)

## 2020-01-16 PROCEDURE — 36415 COLL VENOUS BLD VENIPUNCTURE: CPT

## 2020-01-16 PROCEDURE — 82950 GLUCOSE TEST: CPT

## 2020-01-16 NOTE — TELEPHONE ENCOUNTER
14cm right ovarian cyst. Baby is good. CAN changed to 8/21/2020 based on 8 week scan. F/U scan in 4 weeks to recheck cyst and see Dr. Lopes following.

## 2020-01-29 ENCOUNTER — ROUTINE PRENATAL (OUTPATIENT)
Dept: OBSTETRICS AND GYNECOLOGY | Facility: CLINIC | Age: 25
End: 2020-01-29

## 2020-01-29 VITALS — BODY MASS INDEX: 35.88 KG/M2 | DIASTOLIC BLOOD PRESSURE: 68 MMHG | SYSTOLIC BLOOD PRESSURE: 128 MMHG | WEIGHT: 243 LBS

## 2020-01-29 DIAGNOSIS — N83.291 COMPLEX CYST OF RIGHT OVARY: ICD-10-CM

## 2020-01-29 DIAGNOSIS — O20.9 BLEEDING IN EARLY PREGNANCY: Primary | ICD-10-CM

## 2020-01-29 DIAGNOSIS — O99.331 TOBACCO SMOKING AFFECTING PREGNANCY IN FIRST TRIMESTER: ICD-10-CM

## 2020-01-29 DIAGNOSIS — F12.11 MARIJUANA ABUSE IN REMISSION: ICD-10-CM

## 2020-01-29 DIAGNOSIS — Z3A.10 10 WEEKS GESTATION OF PREGNANCY: ICD-10-CM

## 2020-01-29 DIAGNOSIS — O99.321 DRUG USE AFFECTING PREGNANCY IN FIRST TRIMESTER: ICD-10-CM

## 2020-01-29 PROCEDURE — 99214 OFFICE O/P EST MOD 30 MIN: CPT | Performed by: NURSE PRACTITIONER

## 2020-01-29 NOTE — PROGRESS NOTES
"CC: spotting since yesterday; hx reviewed, no changes.     Dilia Ray is a 24 y.o.  at 10w5d.  Pt called last night with concerns about vaginal bleeding and right side pelvic discomfort. Notes that it was only a lighter red when she wiped on two occasions last night and was even lighter this morning when she voided. Her discomfort has subsided and when it occurred yesterday it was a 2/10 and only lasted about 20 minutes but occurred right before the bleeding started.     /68   Wt 110 kg (243 lb)   LMP 10/22/2019 (Approximate)   BMI 35.88 kg/m²   SVE: NA     Fetal Heart Rate: + vscan     Problems (from 20 to present)     Problem Noted Resolved    Marijuana abuse in remission 2020 by Zahraa Sanchez APRN No    Overview Signed 2020  1:33 PM by Zahraa Sanchez APRN     Quit smoking on          Drug use affecting pregnancy in first trimester 2020 by Zahraa Sanchez APRN No    Overview Signed 2020  1:33 PM by Zahraa Sanchez APRN     Quit smoking on          Complex cyst of right ovary 2020 by Zahraa Sanchez APRN No    Overview Signed 2020  1:32 PM by Zahraa Sanchez APRN     14cm; u/s f/u on          Bleeding in early pregnancy 2020 by Zahraa Sanchez APRN No    Overview Signed 2020  1:35 PM by Zahraa Sanchez APRN     + BV at time of NOB  14cm right ovarian cyst  Pt states it is improving as of ; precautions reviewed.          Tobacco smoking affecting pregnancy in first trimester 2020 by Zahraa Sanchez APRN No    Overview Signed 2020  2:46 PM by Zahraa Sanchez APRN     3-5 per day \"trying to quit\"               A/P: Dilia Ray is a 24 y.o.  at 10w5d.  - RTC in 2 weeks     Diagnosis Plan   1. Bleeding in early pregnancy  Precautions reviewed. Nothing in vagina until after next appt. U/S on  to recheck cyst and discuss next steps with Dr. Lopes.   2. Drug use affecting pregnancy in first trimester     3. " Tobacco smoking affecting pregnancy in first trimester     4. Marijuana abuse in remission     5. Complex cyst of right ovary     6. 10 weeks gestation of pregnancy       Zahraa Sanchez, APRN  1/29/2020  1:35 PM

## 2020-02-12 ENCOUNTER — ROUTINE PRENATAL (OUTPATIENT)
Dept: OBSTETRICS AND GYNECOLOGY | Facility: CLINIC | Age: 25
End: 2020-02-12

## 2020-02-12 VITALS — BODY MASS INDEX: 35.71 KG/M2 | DIASTOLIC BLOOD PRESSURE: 64 MMHG | SYSTOLIC BLOOD PRESSURE: 122 MMHG | WEIGHT: 241.8 LBS

## 2020-02-12 DIAGNOSIS — O99.331 TOBACCO SMOKING AFFECTING PREGNANCY IN FIRST TRIMESTER: ICD-10-CM

## 2020-02-12 DIAGNOSIS — Z3A.12 12 WEEKS GESTATION OF PREGNANCY: Primary | ICD-10-CM

## 2020-02-12 DIAGNOSIS — N83.291 COMPLEX CYST OF RIGHT OVARY: ICD-10-CM

## 2020-02-12 DIAGNOSIS — O99.321 DRUG USE AFFECTING PREGNANCY IN FIRST TRIMESTER: ICD-10-CM

## 2020-02-12 DIAGNOSIS — F12.11 MARIJUANA ABUSE IN REMISSION: ICD-10-CM

## 2020-02-12 DIAGNOSIS — O20.9 BLEEDING IN EARLY PREGNANCY: ICD-10-CM

## 2020-02-12 PROCEDURE — 99213 OFFICE O/P EST LOW 20 MIN: CPT | Performed by: OBSTETRICS & GYNECOLOGY

## 2020-02-13 NOTE — PROGRESS NOTES
"CC: Prenatal visit    Dilia Ray is a 24 y.o.  at 12w6d.  Doing well.  Presents for follow-up today on previously seen right ovarian cyst.  Cyst moves from right to left on the adnexa and has remained approximately the same size 6 cm x 11 cm x 14 cm.  Patient denies any pelvic pain at this time.  Fetal wellbeing is overall reassuring at this time.  Denies any bleeding or cramping at this time.  Explained to patient that at this point I would recommend continued monitoring of cyst if it is not bothering her and remained stable in size then perhaps no treatment will be needed, do plan to have patient get repeat ultrasound in 2 weeks will have this done by [facility] and will get recommendations from [facility] as far as treatment recommendations.  /64   Wt 110 kg (241 lb 12.8 oz)   LMP 10/22/2019 (Approximate)   BMI 35.71 kg/m²        Fetal Heart Rate: 152     Problems (from 20 to present)     Problem Noted Resolved    Marijuana abuse in remission 2020 by Zahraa Sanchez APRN No    Overview Signed 2020  1:33 PM by Zahraa Sanchez APRN     Quit smoking on          Drug use affecting pregnancy in first trimester 2020 by Zahraa Sanchez APRN No    Overview Signed 2020  1:33 PM by Zahraa Sanchez APRN     Quit smoking on          Complex cyst of right ovary 2020 by Zahraa Sanchez APRN No    Overview Signed 2020  1:32 PM by Zahraa Sanchez APRN     14cm; u/s f/u on          Bleeding in early pregnancy 2020 by Zahraa Sanchez APRN No    Overview Signed 2020  1:35 PM by Zahraa Sanchez APRN     + BV at time of NOB  14cm right ovarian cyst  Pt states it is improving as of ; precautions reviewed.          Tobacco smoking affecting pregnancy in first trimester 2020 by Zahraa Sanchez APRN No    Overview Signed 2020  2:46 PM by Zahraa Sanchez APRN     3-5 per day \"trying to quit\"               A/P: Dilia Ray is a 24 y.o.  at 12w6d.  " Patient overall doing well with stable adnexal mass likely from the right ovary although difficult to tell on ultrasound from today.  Fetal growth appears reassuring at this time.  Bleeding cramping precautions given.  Repeat ultrasound in 2 weeks, will have this done by MFM.  - RTC in 2 weeks     Diagnosis Plan   1. 12 weeks gestation of pregnancy     2. Complex cyst of right ovary     3. Marijuana abuse in remission     4. Drug use affecting pregnancy in first trimester     5. Bleeding in early pregnancy     6. Tobacco smoking affecting pregnancy in first trimester       Guille Lopes,   2/13/2020  7:25 AM

## 2020-02-26 ENCOUNTER — ROUTINE PRENATAL (OUTPATIENT)
Dept: OBSTETRICS AND GYNECOLOGY | Facility: CLINIC | Age: 25
End: 2020-02-26

## 2020-02-26 VITALS — BODY MASS INDEX: 35.88 KG/M2 | SYSTOLIC BLOOD PRESSURE: 122 MMHG | DIASTOLIC BLOOD PRESSURE: 68 MMHG | WEIGHT: 243 LBS

## 2020-02-26 DIAGNOSIS — Z3A.22 22 WEEKS GESTATION OF PREGNANCY: ICD-10-CM

## 2020-02-26 DIAGNOSIS — N83.202 OVARIAN CYST, LEFT: Primary | ICD-10-CM

## 2020-02-26 DIAGNOSIS — N83.291 COMPLEX CYST OF RIGHT OVARY: ICD-10-CM

## 2020-02-26 DIAGNOSIS — F12.11 MARIJUANA ABUSE IN REMISSION: ICD-10-CM

## 2020-02-26 DIAGNOSIS — O99.331 TOBACCO SMOKING AFFECTING PREGNANCY IN FIRST TRIMESTER: ICD-10-CM

## 2020-02-26 DIAGNOSIS — O99.321 DRUG USE AFFECTING PREGNANCY IN FIRST TRIMESTER: ICD-10-CM

## 2020-02-26 DIAGNOSIS — O20.9 BLEEDING IN EARLY PREGNANCY: ICD-10-CM

## 2020-02-26 PROCEDURE — 99213 OFFICE O/P EST LOW 20 MIN: CPT | Performed by: OBSTETRICS & GYNECOLOGY

## 2020-02-26 NOTE — PROGRESS NOTES
"CC: Prenatal visit    Dilia Ray is a 24 y.o.  at 14w5d.  Doing well.  Is having some mild abdominal cramping likely normal pains of pregnancy.  Nuys any bleeding at this time.  Patient had ultrasound today which showed single live intrauterine pregnancy measuring 15 weeks and 1 day which is consistent with a due date of  based on 8-week ultrasound.  Patient with 2 cysts on the left ovary one measuring 9 cm and 1 measuring 8 cm.  Both are simple in appearance.  [facility] recommendations are for continued monitoring with repeat ultrasound in 4 weeks.  /68   Wt 110 kg (243 lb)   LMP 10/22/2019 (Approximate)   BMI 35.88 kg/m²        Fetal Heart Rate: 147     Problems (from 20 to present)     Problem Noted Resolved    Ovarian cyst, left 2020 by Guille Lopes DO No    Overview Signed 2020  3:43 PM by Guille Lopes DO     Patient had ultrasound at 15 weeks which showed 2 left ovarian cysts one measuring 9 x 6 x 8, the other measuring 8 x 6 x 7.         Marijuana abuse in remission 2020 by Zahraa Sanchez APRN No    Overview Signed 2020  1:33 PM by Zahraa Sanchez APRN     Quit smoking on          Drug use affecting pregnancy in first trimester 2020 by Zahraa Sanchez APRN No    Overview Signed 2020  1:33 PM by Zahraa Sanchez APRN     Quit smoking on          Bleeding in early pregnancy 2020 by Zahraa Sanchez APRN No    Overview Signed 2020  1:35 PM by Zahraa Sanchez APRN     + BV at time of NOB  14cm right ovarian cyst  Pt states it is improving as of ; precautions reviewed.          Tobacco smoking affecting pregnancy in first trimester 2020 by Zahraa Sanchez APRN No    Overview Signed 2020  2:46 PM by Zahraa Sanchez APRN     3-5 per day \"trying to quit\"         Complex cyst of right ovary 2020 by Zahraa Sanchez APRN 2020 by Guille Lopes DO    Overview Signed 2020  1:32 PM by " Zahraa Sanchez, APRN     14cm; u/s f/u on                A/P: Dilia Ray is a 24 y.o.  at 14w5d.  Doing well with appropriately progressing pregnancy.  2 simple cyst on left ovary will continue to monitor with serial ultrasounds next 1 in 4 weeks.  Bleeding and cramping precautions given.  Patient overall stable and doing well at this time.  Patient to return to see me in 2 weeks, sooner as needed.  - RTC in 2 weeks     Diagnosis Plan   1. Ovarian cyst, left     2. Complex cyst of right ovary     3. Marijuana abuse in remission     4. Drug use affecting pregnancy in first trimester     5. Bleeding in early pregnancy     6. Tobacco smoking affecting pregnancy in first trimester     7. 22 weeks gestation of pregnancy       Guille Lopes DO  2020  3:44 PM

## 2020-03-03 ENCOUNTER — TELEPHONE (OUTPATIENT)
Dept: OBSTETRICS AND GYNECOLOGY | Facility: CLINIC | Age: 25
End: 2020-03-03

## 2020-03-03 ENCOUNTER — APPOINTMENT (OUTPATIENT)
Dept: ULTRASOUND IMAGING | Facility: HOSPITAL | Age: 25
End: 2020-03-03

## 2020-03-03 ENCOUNTER — HOSPITAL ENCOUNTER (EMERGENCY)
Facility: HOSPITAL | Age: 25
Discharge: HOME | End: 2020-03-03
Attending: EMERGENCY MEDICINE | Admitting: EMERGENCY MEDICINE

## 2020-03-03 VITALS
OXYGEN SATURATION: 99 % | SYSTOLIC BLOOD PRESSURE: 121 MMHG | HEART RATE: 87 BPM | HEIGHT: 68 IN | RESPIRATION RATE: 18 BRPM | WEIGHT: 248.4 LBS | DIASTOLIC BLOOD PRESSURE: 69 MMHG | BODY MASS INDEX: 37.65 KG/M2 | TEMPERATURE: 98 F

## 2020-03-03 DIAGNOSIS — Z3A.15 15 WEEKS GESTATION OF PREGNANCY: ICD-10-CM

## 2020-03-03 DIAGNOSIS — O23.42 UTI (URINARY TRACT INFECTION) DURING PREGNANCY, SECOND TRIMESTER: ICD-10-CM

## 2020-03-03 DIAGNOSIS — R10.12 LUQ ABDOMINAL PAIN: ICD-10-CM

## 2020-03-03 DIAGNOSIS — N83.202 CYST OF LEFT OVARY: Primary | ICD-10-CM

## 2020-03-03 LAB
ABO GROUP BLD: NORMAL
ALBUMIN SERPL-MCNC: 3.9 G/DL (ref 3.5–5.2)
ALBUMIN/GLOB SERPL: 1.2 G/DL
ALP SERPL-CCNC: 65 U/L (ref 39–117)
ALT SERPL W P-5'-P-CCNC: 32 U/L (ref 1–33)
ANION GAP SERPL CALCULATED.3IONS-SCNC: 14 MMOL/L (ref 5–15)
AST SERPL-CCNC: 19 U/L (ref 1–32)
BACTERIA UR QL AUTO: ABNORMAL /HPF
BASOPHILS # BLD AUTO: 0.03 10*3/MM3 (ref 0–0.2)
BASOPHILS NFR BLD AUTO: 0.2 % (ref 0–1.5)
BILIRUB SERPL-MCNC: <0.2 MG/DL (ref 0.2–1.2)
BILIRUB UR QL STRIP: NEGATIVE
BUN BLD-MCNC: 7 MG/DL (ref 6–20)
BUN/CREAT SERPL: 18.4 (ref 7–25)
CALCIUM SPEC-SCNC: 9.2 MG/DL (ref 8.6–10.5)
CHLORIDE SERPL-SCNC: 102 MMOL/L (ref 98–107)
CLARITY UR: ABNORMAL
CO2 SERPL-SCNC: 19 MMOL/L (ref 22–29)
COLOR UR: YELLOW
CREAT BLD-MCNC: 0.38 MG/DL (ref 0.57–1)
DEPRECATED RDW RBC AUTO: 42.5 FL (ref 37–54)
EOSINOPHIL # BLD AUTO: 0.19 10*3/MM3 (ref 0–0.4)
EOSINOPHIL NFR BLD AUTO: 1.5 % (ref 0.3–6.2)
ERYTHROCYTE [DISTWIDTH] IN BLOOD BY AUTOMATED COUNT: 13.2 % (ref 12.3–15.4)
GFR SERPL CREATININE-BSD FRML MDRD: >150 ML/MIN/1.73
GLOBULIN UR ELPH-MCNC: 3.2 GM/DL
GLUCOSE BLD-MCNC: 120 MG/DL (ref 65–99)
GLUCOSE UR STRIP-MCNC: NEGATIVE MG/DL
HCT VFR BLD AUTO: 34.5 % (ref 34–46.6)
HGB BLD-MCNC: 12.1 G/DL (ref 12–15.9)
HGB UR QL STRIP.AUTO: NEGATIVE
HOLD SPECIMEN: NORMAL
HYALINE CASTS UR QL AUTO: ABNORMAL /LPF
IMM GRANULOCYTES # BLD AUTO: 0.06 10*3/MM3 (ref 0–0.05)
IMM GRANULOCYTES NFR BLD AUTO: 0.5 % (ref 0–0.5)
KETONES UR QL STRIP: NEGATIVE
LEUKOCYTE ESTERASE UR QL STRIP.AUTO: ABNORMAL
LIPASE SERPL-CCNC: 19 U/L (ref 13–60)
LYMPHOCYTES # BLD AUTO: 2.4 10*3/MM3 (ref 0.7–3.1)
LYMPHOCYTES NFR BLD AUTO: 19.6 % (ref 19.6–45.3)
MCH RBC QN AUTO: 30.9 PG (ref 26.6–33)
MCHC RBC AUTO-ENTMCNC: 35.1 G/DL (ref 31.5–35.7)
MCV RBC AUTO: 88.2 FL (ref 79–97)
MONOCYTES # BLD AUTO: 0.68 10*3/MM3 (ref 0.1–0.9)
MONOCYTES NFR BLD AUTO: 5.5 % (ref 5–12)
NEUTROPHILS # BLD AUTO: 8.9 10*3/MM3 (ref 1.7–7)
NEUTROPHILS NFR BLD AUTO: 72.7 % (ref 42.7–76)
NITRITE UR QL STRIP: NEGATIVE
NRBC BLD AUTO-RTO: 0 /100 WBC (ref 0–0.2)
PH UR STRIP.AUTO: 7 [PH] (ref 5–9)
PLATELET # BLD AUTO: 269 10*3/MM3 (ref 140–450)
PMV BLD AUTO: 9.5 FL (ref 6–12)
POTASSIUM BLD-SCNC: 3.4 MMOL/L (ref 3.5–5.2)
PROT SERPL-MCNC: 7.1 G/DL (ref 6–8.5)
PROT UR QL STRIP: NEGATIVE
RBC # BLD AUTO: 3.91 10*6/MM3 (ref 3.77–5.28)
RBC # UR: ABNORMAL /HPF
REF LAB TEST METHOD: ABNORMAL
RH BLD: POSITIVE
SODIUM BLD-SCNC: 135 MMOL/L (ref 136–145)
SP GR UR STRIP: 1.02 (ref 1–1.03)
SQUAMOUS #/AREA URNS HPF: ABNORMAL /HPF
UROBILINOGEN UR QL STRIP: ABNORMAL
WBC NRBC COR # BLD: 12.26 10*3/MM3 (ref 3.4–10.8)
WBC UR QL AUTO: ABNORMAL /HPF
WHOLE BLOOD HOLD SPECIMEN: NORMAL
WHOLE BLOOD HOLD SPECIMEN: NORMAL

## 2020-03-03 PROCEDURE — 76815 OB US LIMITED FETUS(S): CPT

## 2020-03-03 PROCEDURE — 80053 COMPREHEN METABOLIC PANEL: CPT | Performed by: EMERGENCY MEDICINE

## 2020-03-03 PROCEDURE — 96365 THER/PROPH/DIAG IV INF INIT: CPT

## 2020-03-03 PROCEDURE — 96360 HYDRATION IV INFUSION INIT: CPT

## 2020-03-03 PROCEDURE — 93976 VASCULAR STUDY: CPT

## 2020-03-03 PROCEDURE — 83690 ASSAY OF LIPASE: CPT | Performed by: EMERGENCY MEDICINE

## 2020-03-03 PROCEDURE — 86900 BLOOD TYPING SEROLOGIC ABO: CPT | Performed by: EMERGENCY MEDICINE

## 2020-03-03 PROCEDURE — 81001 URINALYSIS AUTO W/SCOPE: CPT | Performed by: EMERGENCY MEDICINE

## 2020-03-03 PROCEDURE — 86901 BLOOD TYPING SEROLOGIC RH(D): CPT | Performed by: EMERGENCY MEDICINE

## 2020-03-03 PROCEDURE — 85025 COMPLETE CBC W/AUTO DIFF WBC: CPT | Performed by: EMERGENCY MEDICINE

## 2020-03-03 PROCEDURE — 99284 EMERGENCY DEPT VISIT MOD MDM: CPT

## 2020-03-03 PROCEDURE — 96361 HYDRATE IV INFUSION ADD-ON: CPT

## 2020-03-03 RX ORDER — CEPHALEXIN 500 MG/1
500 CAPSULE ORAL 4 TIMES DAILY
Qty: 28 CAPSULE | Refills: 0 | Status: SHIPPED | OUTPATIENT
Start: 2020-03-03 | End: 2020-03-10

## 2020-03-03 RX ORDER — SODIUM CHLORIDE 9 MG/ML
125 INJECTION, SOLUTION INTRAVENOUS CONTINUOUS
Status: DISCONTINUED | OUTPATIENT
Start: 2020-03-03 | End: 2020-03-03 | Stop reason: HOSPADM

## 2020-03-03 RX ORDER — SODIUM CHLORIDE 0.9 % (FLUSH) 0.9 %
10 SYRINGE (ML) INJECTION AS NEEDED
Status: DISCONTINUED | OUTPATIENT
Start: 2020-03-03 | End: 2020-03-03 | Stop reason: HOSPADM

## 2020-03-03 RX ADMIN — SODIUM CHLORIDE 125 ML/HR: 900 INJECTION, SOLUTION INTRAVENOUS at 15:37

## 2020-03-03 NOTE — DISCHARGE INSTRUCTIONS
Return ED increased abdominal or pelvic pain, fever, vomiting, vaginal bleeding, loss of fluid, contractions, worse condition, other concerns

## 2020-03-03 NOTE — ED PROVIDER NOTES
"Subjective   23yo  @ 15 4/7wks gestation, EDC 2020, presents ED c/o 2d hx intermittent LUQ abdominal pain characterized as \"sharp\"/nonradiating/exac movement/neg relieve factors.  ROS (+) n/v yesterday, resolved.  Denies fever/chest pain/dysuria/vaginal bleeding/vaginal discharge/loss of fluid/contractions/melena/hematochoezia/hematemesis/syncope.      History provided by:  Patient  Abdominal Pain   Pain location:  LUQ  Pain quality: sharp    Pain radiates to:  Does not radiate  Pain severity:  Moderate  Duration:  2 days  Timing:  Intermittent  Associated symptoms: nausea and vomiting    Associated symptoms: no constipation, no diarrhea, no vaginal bleeding and no vaginal discharge        Review of Systems   Constitutional: Negative.    HENT: Negative.    Respiratory: Negative.    Cardiovascular: Negative.    Gastrointestinal: Positive for abdominal pain, nausea and vomiting. Negative for blood in stool, constipation and diarrhea.   Genitourinary: Negative.  Negative for vaginal bleeding and vaginal discharge.   Musculoskeletal: Negative.    Skin: Negative.    Neurological: Negative for syncope.   All other systems reviewed and are negative.      Past Medical History:   Diagnosis Date   • Acne vulgaris    • Allergic rhinitis    • Anxiety    • Asthma    • Attention deficit hyperactivity disorder    • Chlamydia    • Conductive hearing loss     bilateral     • Depression    • Dysfunction of eustachian tube    • Elbow fracture, left    • Encounter for routine gynecological examination    • Epistaxis    • Gonorrhea    • Hand pain     right contusion      • Headache    • Heart murmur    • Herpetic maría    • High risk sexual behavior    • History of respiratory therapy 2011    Nebulizer Treatment 74466 (1) - DANI Johnson   • Irregular periods    • Laceration of foot    • Malaise and fatigue    • Mallet finger    • Myopia    • Nausea and vomiting    • Otalgia    • Otitis media    • Pain in wrist    • " Pediculosis capitis    • Polycystic ovary syndrome    • Scoliosis    • Sprain of foot, left    • Syncope, near    • Upper respiratory infection    • Verruca plantaris     right great toe          No Known Allergies    Past Surgical History:   Procedure Laterality Date   • ADENOIDECTOMY  04/01/2004   • CAUTERIZATION NASAL BLEEDERS  04/01/2004    CAUTERIZATION INNER NOSE 83951 (1)   • OTHER SURGICAL HISTORY  04/01/2004    INCISION OF EARDRUM GENERAL ANESTHETIC 23201 (3) - BILATERAL TUBE IMPLANTS   • TONSILLECTOMY  2001   • WISDOM TOOTH EXTRACTION         Family History   Problem Relation Age of Onset   • Hypertension Father    • Supraventricular tachycardia Father    • No Known Problems Brother    • No Known Problems Sister    • Diabetes Paternal Grandfather    • Fibromyalgia Paternal Grandmother    • Breast cancer Maternal Grandmother    • Diabetes Maternal Grandfather    • Prostate cancer Maternal Grandfather    • Stomach cancer Maternal Grandfather        Social History     Socioeconomic History   • Marital status: Single     Spouse name: Not on file   • Number of children: Not on file   • Years of education: Not on file   • Highest education level: Not on file   Tobacco Use   • Smoking status: Current Every Day Smoker     Packs/day: 0.00     Types: Cigarettes   • Smokeless tobacco: Never Used   • Tobacco comment: 3 cigarettes per day    Substance and Sexual Activity   • Alcohol use: Not Currently   • Drug use: No   • Sexual activity: Yes     Partners: Male     Comment: has not had a pap test            Objective   Physical Exam   Constitutional: She is oriented to person, place, and time. She appears well-developed and well-nourished.   HENT:   Head: Normocephalic and atraumatic.   Mouth/Throat: Oropharynx is clear and moist.   Eyes: Pupils are equal, round, and reactive to light.   Neck: Normal range of motion. Neck supple. No JVD present. No tracheal deviation present.   Cardiovascular: Normal rate, regular  rhythm, normal heart sounds and intact distal pulses. Exam reveals no gallop and no friction rub.   No murmur heard.  Pulmonary/Chest: Effort normal and breath sounds normal. She has no wheezes. She has no rales.   Abdominal: Soft. Normal appearance and bowel sounds are normal. There is tenderness in the left upper quadrant. There is no rigidity, no rebound, no guarding, no CVA tenderness, no tenderness at McBurney's point and negative Rodriguez's sign.       Musculoskeletal: Normal range of motion. She exhibits no edema.   Lymphadenopathy:     She has no cervical adenopathy.   Neurological: She is alert and oriented to person, place, and time.   Skin: Skin is warm and dry.   Nursing note and vitals reviewed.      Procedures           ED Course  ED Course as of Mar 03 1750   Tue Mar 03, 2020   1729 Patient seen et examined by Dr. Lopes in ED. States stable discharge.    [SD]   1748 Patient resting comfortably. Abdomen soft nontender. Neg r/r/g/hsm. Stable discharge w/precautions.    [SD]      ED Course User Index  [SD] Trace Lynn MD      Labs Reviewed   COMPREHENSIVE METABOLIC PANEL - Abnormal; Notable for the following components:       Result Value    Glucose 120 (*)     Creatinine 0.38 (*)     Sodium 135 (*)     Potassium 3.4 (*)     CO2 19.0 (*)     Total Bilirubin <0.2 (*)     All other components within normal limits    Narrative:     GFR Normal >60  Chronic Kidney Disease <60  Kidney Failure <15     URINALYSIS W/ MICROSCOPIC IF INDICATED (NO CULTURE) - Abnormal; Notable for the following components:    Appearance, UA Cloudy (*)     Leuk Esterase, UA Large (3+) (*)     All other components within normal limits   CBC WITH AUTO DIFFERENTIAL - Abnormal; Notable for the following components:    WBC 12.26 (*)     Neutrophils, Absolute 8.90 (*)     Immature Grans, Absolute 0.06 (*)     All other components within normal limits   URINALYSIS, MICROSCOPIC ONLY - Abnormal; Notable for the following components:     RBC, UA 0-2 (*)     WBC, UA 21-30 (*)     Bacteria, UA 1+ (*)     Squamous Epithelial Cells, UA 6-12 (*)     All other components within normal limits   LIPASE - Normal   ABO/RH   CBC AND DIFFERENTIAL    Narrative:     The following orders were created for panel order CBC & Differential.  Procedure                               Abnormality         Status                     ---------                               -----------         ------                     CBC Auto Differential[[id number]]        Abnormal            Final result                 Please view results for these tests on the individual orders.   EXTRA TUBES    Narrative:     The following orders were created for panel order Extra Tubes.  Procedure                               Abnormality         Status                     ---------                               -----------         ------                     Light Blue Top[[id number]]                                   Final result               Lavender Top[[id number]]                                     Final result               Gold Top - SST[[id number]]                                   Final result                 Please view results for these tests on the individual orders.   LIGHT BLUE TOP   LAVENDER TOP   GOLD TOP - SST     Us Ob < 14 Weeks Single Or First Gestation    Result Date: 2/12/2020  Narrative: EXAM: US LESS THAN 14 WEEKS PREGNANCY ORDERING PROVIDER: SLIME KIRKPATRICK CLINICAL HISTORY: Follow-up of right ovarian cyst COMPARISON STUDY: 1/16/2020 TECHNIQUE: Transvaginal real-time 2-dimensional grayscale, color Doppler with spectral analysis of the arterial and venous blood supply was performed of each ovary.  Ultrasound evaluation of the uterus was also obtained. FINDINGS: Viable intrauterine gestation with gestational age of 13 weeks zero day, based on crown-rump length of 6.66 cm. Fetal heart motion of 152 BPM. Prominent mildly complex cyst in the pelvic region extending from the right to  left side measuring up to 14.1 x 11.5 x 6.0 cm, previously measuring up to 14.8 cm, with mild reduction since prior.     Impression: 1.  Viable 13 weeks zero day gestation with fetal heart motion of 152 BPM. 2.  Again noted is prominent mildly complex pelvic cyst extending from the right to left side, measuring up to 14.1 x 11.5 x 6.0 cm, reduced from prior dimension of up to 14.8 cm. Electronically signed by:  Saeid Oneal MD  2/12/2020 7:20 PM CST Workstation: [id number]    Us Ob Limited 1 + Fetuses    Result Date: 3/3/2020  Narrative: Ultrasound pregnancy limited HISTORY: Abdominal pain. Transabdominal ultrasound of the pelvis was performed. COMPARISON: February 12, 2020. FINDINGS: Maternal cervix measures 3.32 cm in length. Single live intrauterine fetus in breech position. Amount of amniotic fluid within normal limits. Amniotic fluid index 11.37 cm. Posterior placenta without previa or abruption. Fetal heart rate 147.46 beats per minute. No fetal gestational age measurements obtained for this limited study. Right ovary not visualized. 14.30 cm left ovarian cyst. Doppler and color Doppler demonstrate flow in the peripheral ovarian tissue. However, this is not excluded intermittent torsion, and would still have to be concerned with torsion given the size of the cyst. No free fluid.     Impression: CONCLUSION: Single live intrauterine fetus in breech position. Amount of amniotic fluid within normal limits. Amniotic fluid index 11.37 cm. Posterior placenta without previa or abruption. Fetal heart rate 147.46 beats per minute. Right ovary not visualized. 14.30 cm left ovarian cyst. Doppler and color Doppler demonstrate flow in the peripheral ovarian tissue. However, this is not excluded intermittent torsion, and would still have to be concerned with torsion given the size of the cyst. [id number] Electronically signed by:  Zander Hager MD  3/3/2020 4:34 PM CST Workstation: [id number]    Us Testicular Or Ovarian Vascular  Limited    Result Date: 3/3/2020  Narrative: Ultrasound pregnancy limited HISTORY: Abdominal pain. Transabdominal ultrasound of the pelvis was performed. COMPARISON: February 12, 2020. FINDINGS: Maternal cervix measures 3.32 cm in length. Single live intrauterine fetus in breech position. Amount of amniotic fluid within normal limits. Amniotic fluid index 11.37 cm. Posterior placenta without previa or abruption. Fetal heart rate 147.46 beats per minute. No fetal gestational age measurements obtained for this limited study. Right ovary not visualized. 14.30 cm left ovarian cyst. Doppler and color Doppler demonstrate flow in the peripheral ovarian tissue. However, this is not excluded intermittent torsion, and would still have to be concerned with torsion given the size of the cyst. No free fluid.     Impression: CONCLUSION: Single live intrauterine fetus in breech position. Amount of amniotic fluid within normal limits. Amniotic fluid index 11.37 cm. Posterior placenta without previa or abruption. Fetal heart rate 147.46 beats per minute. Right ovary not visualized. 14.30 cm left ovarian cyst. Doppler and color Doppler demonstrate flow in the peripheral ovarian tissue. However, this is not excluded intermittent torsion, and would still have to be concerned with torsion given the size of the cyst. [id number] Electronically signed by:  Zander Hager MD  3/3/2020 4:34 PM CST Workstation: [id number]                                         [facility]    Final diagnoses:   Cyst of left ovary   15 weeks gestation of pregnancy   UTI (urinary tract infection) during pregnancy, second trimester   LUQ abdominal pain            Trace Lynn MD  03/03/20 1737       Trace Lynn MD  03/03/20 1749       Trace Lynn MD  03/03/20 6715

## 2020-03-06 DIAGNOSIS — Z3A.15 15 WEEKS GESTATION OF PREGNANCY: Primary | ICD-10-CM

## 2020-03-10 DIAGNOSIS — Z3A.15 15 WEEKS GESTATION OF PREGNANCY: ICD-10-CM

## 2020-03-11 ENCOUNTER — ROUTINE PRENATAL (OUTPATIENT)
Dept: OBSTETRICS AND GYNECOLOGY | Facility: CLINIC | Age: 25
End: 2020-03-11

## 2020-03-11 VITALS — WEIGHT: 248 LBS | BODY MASS INDEX: 37.71 KG/M2 | DIASTOLIC BLOOD PRESSURE: 68 MMHG | SYSTOLIC BLOOD PRESSURE: 119 MMHG

## 2020-03-11 DIAGNOSIS — F12.11 MARIJUANA ABUSE IN REMISSION: ICD-10-CM

## 2020-03-11 DIAGNOSIS — O99.331 TOBACCO SMOKING AFFECTING PREGNANCY IN FIRST TRIMESTER: ICD-10-CM

## 2020-03-11 DIAGNOSIS — O09.42 SUPERVISION OF HIGH-RISK PREGNANCY WITH GRAND MULTIPARITY IN SECOND TRIMESTER: Primary | ICD-10-CM

## 2020-03-11 DIAGNOSIS — O99.321 DRUG USE AFFECTING PREGNANCY IN FIRST TRIMESTER: ICD-10-CM

## 2020-03-11 DIAGNOSIS — N83.202 OVARIAN CYST, LEFT: ICD-10-CM

## 2020-03-11 DIAGNOSIS — O20.9 BLEEDING IN EARLY PREGNANCY: ICD-10-CM

## 2020-03-11 PROCEDURE — 99213 OFFICE O/P EST LOW 20 MIN: CPT | Performed by: OBSTETRICS & GYNECOLOGY

## 2020-03-11 RX ORDER — ONDANSETRON 8 MG/1
8 TABLET, ORALLY DISINTEGRATING ORAL EVERY 8 HOURS PRN
Qty: 20 TABLET | Refills: 4 | Status: SHIPPED | OUTPATIENT
Start: 2020-03-11 | End: 2020-03-29

## 2020-03-11 NOTE — PROGRESS NOTES
CC: Prenatal visit    Dilia Ray is a 24 y.o.  at 16w5d.  Doing well.  No complaints.  Denies contractions, LOF, or VB.  Reports good FM.  Patient states that she did have one episode of vomiting today although did eat chicken nuggets from a gas station.  Patient states that she does not feel well but cannot explain why.  Or how.  Patient was seen in the ER last week for pelvic pain there is no clear evidence of ovarian torsion at that time.  Patient to get anatomy scan at next visit to evaluate fetal status as well as to evaluate ovarian cyst.    /68   Wt 112 kg (248 lb)   LMP 10/22/2019 (Approximate)   BMI 37.71 kg/m²        Fetal Heart Rate: 155     Problems (from 20 to present)     Problem Noted Resolved    Supervision of high-risk pregnancy with grand multiparity in second trimester 3/11/2020 by Guille Lopes DO No    Overview Signed 3/11/2020  3:10 PM by Guille oLpes DO     Patient with 2 large ovarian cysts both measuring 9 cm we will continue to follow closely per M recommendations.         Ovarian cyst, left 2020 by Guille Lopes DO No    Overview Signed 2020  3:43 PM by Guille Lopes DO     Patient had ultrasound at 15 weeks which showed 2 left ovarian cysts one measuring 9 x 6 x 8, the other measuring 8 x 6 x 7.         Marijuana abuse in remission 2020 by Zahraa Sanchez APRN No    Overview Signed 2020  1:33 PM by Zahraa Sanchez APRN     Quit smoking on          Drug use affecting pregnancy in first trimester 2020 by Zahraa Sanchez APRN No    Overview Signed 2020  1:33 PM by Zahraa Sanchez APRN     Quit smoking on          Bleeding in early pregnancy 2020 by Zahraa Sanchez APRN No    Overview Signed 2020  1:35 PM by Zahraa Sanchez APRN     + BV at time of NOB  14cm right ovarian cyst  Pt states it is improving as of ; precautions reviewed.          Tobacco smoking affecting  "pregnancy in first trimester 2020 by Zahraa Sanchez, APRN No    Overview Signed 2020  2:46 PM by Zahraa Sanchez, APRN     3-5 per day \"trying to quit\"         Complex cyst of right ovary 2020 by Zahraa Sanchez, APRN 2020 by Guille Lopes,     Overview Signed 2020  1:32 PM by Zahraa Sanchez, APRN     14cm; u/s f/u on                A/P: Dilia Ray is a 24 y.o.  at 16w5d.  Doing well pregnancy complicated by left ovarian cyst possibly 2 cysts measuring 8 cm each.  Patient is overall stable today.  Patient to get anatomy scan in 2 weeks follow-up in 2 weeks we will see Dr. Reyez for that visit as I will be on vacation.  Patient may return to see me after that.  Patient to return sooner as needed.  Torsion precautions given.  - RTC in 2 weeks     Diagnosis Plan   1. Supervision of high-risk pregnancy with grand multiparity in second trimester     2. Ovarian cyst, left     3. Marijuana abuse in remission     4. Drug use affecting pregnancy in first trimester     5. Bleeding in early pregnancy     6. Tobacco smoking affecting pregnancy in first trimester       Guille Lopes DO  3/11/2020  15:24    "

## 2020-03-23 ENCOUNTER — ROUTINE PRENATAL (OUTPATIENT)
Dept: OBSTETRICS AND GYNECOLOGY | Facility: CLINIC | Age: 25
End: 2020-03-23

## 2020-03-23 VITALS — WEIGHT: 251.8 LBS | SYSTOLIC BLOOD PRESSURE: 108 MMHG | BODY MASS INDEX: 38.29 KG/M2 | DIASTOLIC BLOOD PRESSURE: 66 MMHG

## 2020-03-23 DIAGNOSIS — F12.11 MARIJUANA ABUSE IN REMISSION: ICD-10-CM

## 2020-03-23 DIAGNOSIS — N83.202 OVARIAN CYST, LEFT: ICD-10-CM

## 2020-03-23 DIAGNOSIS — Z3A.18 18 WEEKS GESTATION OF PREGNANCY: Primary | ICD-10-CM

## 2020-03-23 DIAGNOSIS — O99.331 TOBACCO SMOKING AFFECTING PREGNANCY IN FIRST TRIMESTER: ICD-10-CM

## 2020-03-23 DIAGNOSIS — O20.9 BLEEDING IN EARLY PREGNANCY: ICD-10-CM

## 2020-03-23 DIAGNOSIS — O99.321 DRUG USE AFFECTING PREGNANCY IN FIRST TRIMESTER: ICD-10-CM

## 2020-03-23 PROBLEM — R19.00 PELVIC MASS DURING PREGNANCY: Status: ACTIVE | Noted: 2020-03-23

## 2020-03-23 PROBLEM — O26.899 PELVIC MASS DURING PREGNANCY: Status: ACTIVE | Noted: 2020-03-23

## 2020-03-23 PROCEDURE — 99213 OFFICE O/P EST LOW 20 MIN: CPT | Performed by: OBSTETRICS & GYNECOLOGY

## 2020-03-23 NOTE — PROGRESS NOTES
"CC: Prenatal visit    Dilia Ray is a 24 y.o.  at 18w3d.  Doing well.  No complaints.  Denies contractions, LOF, or VB.  Reports good FM.    Wt 114 kg (251 lb 12.8 oz)   LMP 10/22/2019 (Approximate)   BMI 38.29 kg/m²   SVE: Not done           Problems (from 20 to present)     Problem Noted Resolved    Pelvic mass during pregnancy 3/23/2020 by Kale Avila MD No    Priority:  High      Supervision of high-risk pregnancy with grand multiparity in second trimester 3/11/2020 by Guille Lopes DO No    Overview Signed 3/11/2020  3:10 PM by Guille Lopes DO     Patient with 2 large ovarian cysts both measuring 9 cm we will continue to follow closely per [facility] recommendations.         Ovarian cyst, left 2020 by Guille Lopes DO No    Overview Signed 2020  3:43 PM by Guille Lopes DO     Patient had ultrasound at 15 weeks which showed 2 left ovarian cysts one measuring 9 x 6 x 8, the other measuring 8 x 6 x 7.         Marijuana abuse in remission 2020 by Zahraa Sanchez APRN No    Overview Signed 2020  1:33 PM by Zahraa Sanchez APRN     Quit smoking on          Drug use affecting pregnancy in first trimester 2020 by Zahraa Sanchez APRN No    Overview Signed 2020  1:33 PM by Zahraa Sanchez APRN     Quit smoking on          Bleeding in early pregnancy 2020 by Zahraa Sanchez APRN No    Overview Signed 2020  1:35 PM by Zahraa Sanchez APRN     + BV at time of NOB  14cm right ovarian cyst  Pt states it is improving as of ; precautions reviewed.          Tobacco smoking affecting pregnancy in first trimester 2020 by Zahraa Sanchez APRN No    Overview Signed 2020  2:46 PM by Zahraa Sanchez APRN     3-5 per day \"trying to quit\"         Complex cyst of right ovary 2020 by Zahraa Sanchez APRN 2020 by Guille Lopes DO    Overview Signed 2020  1:32 PM by Zahraa Sanchez APRN     " 14cm; u/s f/u on                A/P: Dilia Ray is a 24 y.o.  at 18w3d.  - RTC in 2 weeks     Diagnosis Plan   1. 18 weeks gestation of pregnancy     2. Ovarian cyst, left   measurements today are larger than last measurements 3/6 which were maximum diameter of 11.36 today is 14.  I reviewed the images with the sonographer.  For [employer] did both the scans and feels there has been an interval increase.  However going back through the scans before this most of those were in the 14 cm range.  The lesion is primarily cystic there are some more complex component and there is either a septation or there possibly 2 cysts.  After long review with the patient and later her mother because of the question of interval growth as opposed to continued shrinking they want to obtain a GYN oncology consultation.  I have spoken with Dr. Bauman who says she will see the patient.  She said she did not think there were any tumor markers that would be helpful in this situation   3. Marijuana abuse in remission     4. Drug use affecting pregnancy in first trimester     5. Bleeding in early pregnancy     6. Tobacco smoking affecting pregnancy in first trimester       Kale Avila MD  3/23/2020  18:02

## 2020-03-23 NOTE — H&P
History and Physical    Dilia Ray is a 24 y.o. y/o female.     Chief Complaint: I am concerned my left ovarian cyst got bigger(???)    HPI:   24 y.o. .  Patient's last menstrual period was 10/22/2019 (approximate)..  Patient is pregnant at 18 weeks and 3 days(by early ultrasound at 13 weeks) she has been followed for a pelvic mass since early pregnancy.  Her last ultrasound 3/6/2020 [facility] showed the mass to be 11.3 x 7 x 8.71.  Today the measurements are 13.65 x 6.73 x 14.6.  However going through her prior multiple ultrasounds before these which were done by the radiology department they were getting maximal measurements in the 14 range on transvaginal ultrasound.  I reviewed the images with [facility] sonographer who did the study today in the prior study who feels there has been an increase.  However reviewing the images from radiology I think it is possible the size has been fairly constant and these are different measurements from transvaginal versus transabdominal with a different transabdominal measurements representing shifting location of the ovary as the uterus growth.  The lesion has a significant cystic component but also some areas that are somewhat more complex with a groundglass appearance.  Cystic areas seen over time to be increasing and the groundglass appearance decreasing but again this may be the effect of different projections???    Talk with  of [facility] in [place] who read the study she said overall she thought the lesion was most likely benign but given the issue of growth GYN oncology consultation was probably indicated.    I had a long discussion with the patient and via phone at her request (she has a sign consent on file to discuss her medical information with her mother).  I reviewed the options.  They want to obtain a GYN oncology consultation.  I have spoken with Dr. Bauman who graciously agrees to see the patient in  consultation.  She did not feel serologic markers would be helpful.     In discussion with her mother apparently she had breast cancer at a relatively young age as did another relative no known BRACA testing       Review of Systems   ROS:  CNS: No history of brain malignancy.  HEENT: No history of throat cancer.  Eye: No history of retinal cancer.  Pulmonary: No history of lung cancer.                                                                                 Cardiovascular: No history of cardiac tumors.  Gastrointestinal: No history of small bowel tumors.  Renal: No history of kidney malignancy.  Musculoskeletal: No history of smooth muscle tumors.  Lymphatics: No history of Hodgkin's disease.  Endocrine: No history of thyroid malignancy.    The following portions of the patient's history were reviewed and updated as appropriate: allergies, current medications, past family history, past medical history, past social history, past surgical history and problem list.    No Known Allergies     Prior to Admission medications    Medication Sig Start Date End Date Taking? Authorizing Provider   docusate sodium (COLACE) 100 MG capsule Take 1 capsule by mouth Daily. 1/9/20   Zahraa Sanchez APRN   ondansetron ODT (ZOFRAN ODT) 8 MG disintegrating tablet Take 1 tablet by mouth Every 8 (Eight) Hours As Needed for Nausea or Vomiting. 3/11/20   Guille Lopes, DO   Prenatal MV-Min-FA-Omega-3 (PRENATAL GUMMIES/DHA & FA) 0.4-32.5 MG chewable tablet Chew.    Provider, MD Yamileth       The patient has a family history of   Family History   Problem Relation Age of Onset   • Hypertension Father    • Supraventricular tachycardia Father    • No Known Problems Brother    • No Known Problems Sister    • Diabetes Paternal Grandfather    • Fibromyalgia Paternal Grandmother    • Breast cancer Maternal Grandmother    • Diabetes Maternal Grandfather    • Prostate cancer Maternal Grandfather    • Stomach cancer Maternal  Grandfather         Past Medical History:   Diagnosis Date   • Acne vulgaris    • Allergic rhinitis    • Anxiety    • Asthma    • Attention deficit hyperactivity disorder    • Chlamydia    • Conductive hearing loss     bilateral     • Depression    • Dysfunction of eustachian tube    • Elbow fracture, left    • Encounter for routine gynecological examination    • Epistaxis    • Gonorrhea    • Hand pain     right contusion      • Headache    • Heart murmur    • Herpetic maría    • High risk sexual behavior    • History of respiratory therapy 2011    Nebulizer Treatment 77845 (1) - DANI Johnson   • Irregular periods    • Laceration of foot    • Malaise and fatigue    • Mallet finger    • Myopia    • Nausea and vomiting    • Otalgia    • Otitis media    • Pain in wrist    • Pediculosis capitis    • Polycystic ovary syndrome    • Scoliosis    • Sprain of foot, left    • Syncope, near    • Upper respiratory infection    • Verruca plantaris     right great toe           OB History        1    Para        Term                AB        Living           SAB        TAB        Ectopic        Molar        Multiple        Live Births                     Social History     Socioeconomic History   • Marital status: Single     Spouse name: Not on file   • Number of children: Not on file   • Years of education: Not on file   • Highest education level: Not on file   Tobacco Use   • Smoking status: Current Every Day Smoker     Packs/day: 0.00     Types: Cigarettes   • Smokeless tobacco: Never Used   • Tobacco comment: 3 cigarettes per day    Substance and Sexual Activity   • Alcohol use: Not Currently   • Drug use: No   • Sexual activity: Yes     Partners: Male     Comment: has not had a pap test         Past Surgical History:   Procedure Laterality Date   • ADENOIDECTOMY  2004   • CAUTERIZATION NASAL BLEEDERS  2004    CAUTERIZATION INNER NOSE 08261 (1)   • OTHER SURGICAL HISTORY  2004    INCISION OF  EARDRUM GENERAL ANESTHETIC 71364 (3) - BILATERAL TUBE IMPLANTS   • TONSILLECTOMY  2001   • WISDOM TOOTH EXTRACTION          Patient Active Problem List   Diagnosis   • Insulin resistance   • Tobacco smoking affecting pregnancy in first trimester   • Marijuana abuse in remission   • Drug use affecting pregnancy in first trimester   • Bleeding in early pregnancy   • Ovarian cyst, left   • Supervision of high-risk pregnancy with grand multiparity in second trimester   • 18 weeks gestation of pregnancy   • Pelvic mass during pregnancy        Documented Vitals    03/23/20 1424   BP: 108/66   Weight: 114 kg (251 lb 12.8 oz)        Body mass index is 38.29 kg/m².    Physical Exam  Constitutional: Appears to be in no acute distress; Eyes: Sclera normal; Endocrine system: Thyroid palpate is normal; Pulmonary system: Lungs clear; Cardiovascular system: Heart regular rate and rhythm; Gastrointestinal system: Abdomen soft, nontender, obese uterus just under her umbilicus, active bowel sounds; Urologic system: CVA negative; Psychiatric: Appropriate insight; Neurologic: Gait within normal limits.      Last Labs  Lab Results   Component Value Date    WBC 12.26 (H) 03/03/2020    RBC 3.91 03/03/2020    HGB 12.1 03/03/2020    HCT 34.5 03/03/2020    MCV 88.2 03/03/2020    MCH 30.9 03/03/2020    MCHC 35.1 03/03/2020    RDW 13.2 03/03/2020    RDWSD 42.5 03/03/2020    MPV 9.5 03/03/2020     03/03/2020        Lab Results   Component Value Date    GLUCOSE 120 (H) 03/03/2020    BUN 7 03/03/2020    CREATININE 0.38 (L) 03/03/2020     (L) 03/03/2020    K 3.4 (L) 03/03/2020     03/03/2020    CO2 19.0 (L) 03/03/2020    CALCIUM 9.2 03/03/2020    PROTEINTOT 7.1 03/03/2020    ALBUMIN 3.90 03/03/2020    ALT 32 03/03/2020    AST 19 03/03/2020    ALKPHOS 65 03/03/2020    BILITOT <0.2 (L) 03/03/2020    EGFRIFNONA >150 03/03/2020    GLOB 3.2 03/03/2020    AGRATIO 1.2 03/03/2020    BCR 18.4 03/03/2020    ANIONGAP 14.0 03/03/2020              No results found for: HCGQUAL    Assessment &Plan: 24-year-old  1 at 18-3/7 weeks (EDC 2020) with left pelvic mass questionable recent increase in size versus changes in ultrasound methods.  Morphology is not perfectly simple but overall felt to be probably benign.  Given the complexities of the above will obtain GYN oncology consultation with Dr. Bauman at [facility] in [place]    Plan of care has been reviewed with staff, patient and family.  Risks, benefits of treatment plan have been discussed.  All questions have been answered.             This document has been electronically signed by Kale Avila MD on 2020 18:10    Please note that portions of this note were completed with a voice recognition program.

## 2020-03-31 DIAGNOSIS — Z3A.18 18 WEEKS GESTATION OF PREGNANCY: Primary | ICD-10-CM

## 2020-04-02 DIAGNOSIS — Z3A.18 18 WEEKS GESTATION OF PREGNANCY: ICD-10-CM

## 2020-04-03 ENCOUNTER — ROUTINE PRENATAL (OUTPATIENT)
Dept: OBSTETRICS AND GYNECOLOGY | Facility: CLINIC | Age: 25
End: 2020-04-03

## 2020-04-03 VITALS — BODY MASS INDEX: 38.59 KG/M2 | DIASTOLIC BLOOD PRESSURE: 70 MMHG | WEIGHT: 253.8 LBS | SYSTOLIC BLOOD PRESSURE: 118 MMHG

## 2020-04-03 DIAGNOSIS — O09.42 SUPERVISION OF HIGH-RISK PREGNANCY WITH GRAND MULTIPARITY IN SECOND TRIMESTER: ICD-10-CM

## 2020-04-03 DIAGNOSIS — F12.11 MARIJUANA ABUSE IN REMISSION: ICD-10-CM

## 2020-04-03 DIAGNOSIS — Z3A.20 20 WEEKS GESTATION OF PREGNANCY: Primary | ICD-10-CM

## 2020-04-03 DIAGNOSIS — O26.899 PELVIC MASS DURING PREGNANCY: ICD-10-CM

## 2020-04-03 DIAGNOSIS — O99.321 DRUG USE AFFECTING PREGNANCY IN FIRST TRIMESTER: ICD-10-CM

## 2020-04-03 DIAGNOSIS — N83.202 OVARIAN CYST, LEFT: ICD-10-CM

## 2020-04-03 DIAGNOSIS — O20.9 BLEEDING IN EARLY PREGNANCY: ICD-10-CM

## 2020-04-03 DIAGNOSIS — O99.331 TOBACCO SMOKING AFFECTING PREGNANCY IN FIRST TRIMESTER: ICD-10-CM

## 2020-04-03 DIAGNOSIS — R19.00 PELVIC MASS DURING PREGNANCY: ICD-10-CM

## 2020-04-03 PROCEDURE — 99213 OFFICE O/P EST LOW 20 MIN: CPT | Performed by: OBSTETRICS & GYNECOLOGY

## 2020-04-04 NOTE — PROGRESS NOTES
"CC: Prenatal visit    Dilia Ray is a 24 y.o.  at 20w0d.  Doing well.  No complaints.  Denies contractions, LOF, or VB.  Reports good FM.    /70   Wt 115 kg (253 lb 12.8 oz)   LMP 10/22/2019 (Approximate)   BMI 38.59 kg/m²   SVE: Not done  Fundal Height (cm): 20 cm  Fetal Heart Rate: 150     Problems (from 20 to present)     Problem Noted Resolved    Pelvic mass during pregnancy 3/23/2020 by Kale Avila MD No    Priority:  High      Supervision of high-risk pregnancy with grand multiparity in second trimester 3/11/2020 by Guille Lopes DO No    Overview Signed 3/11/2020  3:10 PM by Guille Lopes DO     Patient with 2 large ovarian cysts both measuring 9 cm we will continue to follow closely per [facility] recommendations.         Ovarian cyst, left 2020 by Guille Lopes DO No    Overview Signed 2020  3:43 PM by Guille Lopes DO     Patient had ultrasound at 15 weeks which showed 2 left ovarian cysts one measuring 9 x 6 x 8, the other measuring 8 x 6 x 7.         Marijuana abuse in remission 2020 by Zahraa Sanchez APRN No    Overview Signed 2020  1:33 PM by Zahraa Sanchez APRN     Quit smoking on          Drug use affecting pregnancy in first trimester 2020 by Zahraa Sanchez APRN No    Overview Signed 2020  1:33 PM by Zahraa Sanchez APRN     Quit smoking on          Bleeding in early pregnancy 2020 by Zahraa Sanchez APRN No    Overview Signed 2020  1:35 PM by Zahraa Sanchez APRN     + BV at time of NOB  14cm right ovarian cyst  Pt states it is improving as of ; precautions reviewed.          Tobacco smoking affecting pregnancy in first trimester 2020 by Zahraa Sanchez APRN No    Overview Signed 2020  2:46 PM by Zahraa Sanchez APRN     3-5 per day \"trying to quit\"         Complex cyst of right ovary 2020 by Zahraa Sanchez, APRN 2020 by Guille Lopes, DO    " Overview Signed 2020  1:32 PM by Zahraa Sanchez, KIM     14cm; u/s f/u on                A/P: Dilia Ray is a 24 y.o.  at 20w0d.  - RTC in 2 weeks  - Reviewed COVID-19 visitation policy  - Reviewed COVID-19 precautions     Diagnosis Plan   1. 20 weeks gestation of pregnancy     2. Pelvic mass during pregnancy   scheduled to have surgery by Dr. Bauman in [place] in the next week or so   3. Supervision of high-risk pregnancy with grand multiparity in second trimester     4. Ovarian cyst, left     5. Marijuana abuse in remission     6. Drug use affecting pregnancy in first trimester     7. Bleeding in early pregnancy     8. Tobacco smoking affecting pregnancy in first trimester     Will coordinate follow-up surgery for suboptimal views with above  Kale Avila MD  4/3/2020  20:55

## 2020-04-10 ENCOUNTER — APPOINTMENT (OUTPATIENT)
Dept: ULTRASOUND IMAGING | Facility: HOSPITAL | Age: 25
End: 2020-04-10

## 2020-04-10 ENCOUNTER — HOSPITAL ENCOUNTER (OUTPATIENT)
Facility: HOSPITAL | Age: 25
Discharge: HOME | End: 2020-04-10
Attending: FAMILY MEDICINE | Admitting: FAMILY MEDICINE

## 2020-04-10 VITALS
BODY MASS INDEX: 37.89 KG/M2 | OXYGEN SATURATION: 97 % | HEART RATE: 90 BPM | RESPIRATION RATE: 20 BRPM | DIASTOLIC BLOOD PRESSURE: 65 MMHG | SYSTOLIC BLOOD PRESSURE: 134 MMHG | WEIGHT: 250 LBS | HEIGHT: 68 IN | TEMPERATURE: 97.3 F

## 2020-04-10 LAB
BACTERIA UR QL AUTO: ABNORMAL /HPF
BILIRUB UR QL STRIP: NEGATIVE
CLARITY UR: ABNORMAL
COLOR UR: YELLOW
GLUCOSE UR STRIP-MCNC: NEGATIVE MG/DL
HGB UR QL STRIP.AUTO: NEGATIVE
HYALINE CASTS UR QL AUTO: ABNORMAL /LPF
KETONES UR QL STRIP: NEGATIVE
LEUKOCYTE ESTERASE UR QL STRIP.AUTO: ABNORMAL
NITRITE UR QL STRIP: NEGATIVE
PH UR STRIP.AUTO: 7 [PH] (ref 5–9)
PROT UR QL STRIP: NEGATIVE
RBC # UR: ABNORMAL /HPF
REF LAB TEST METHOD: ABNORMAL
SP GR UR STRIP: 1.02 (ref 1–1.03)
SQUAMOUS #/AREA URNS HPF: ABNORMAL /HPF
UROBILINOGEN UR QL STRIP: ABNORMAL
WBC UR QL AUTO: ABNORMAL /HPF

## 2020-04-10 PROCEDURE — G0463 HOSPITAL OUTPT CLINIC VISIT: HCPCS

## 2020-04-10 PROCEDURE — 76819 FETAL BIOPHYS PROFIL W/O NST: CPT

## 2020-04-10 PROCEDURE — 76817 TRANSVAGINAL US OBSTETRIC: CPT

## 2020-04-10 PROCEDURE — 81001 URINALYSIS AUTO W/SCOPE: CPT | Performed by: FAMILY MEDICINE

## 2020-04-10 NOTE — NURSING NOTE
Ultrasound and lab results discussed with MD. Order to discharge home, keep scheduled appointment, take pain medication as prescribed and may take Tylenol if continues to refuse to take Lortab as prescribed. Instruct patient not to take together, must be 6 hours apart.

## 2020-04-10 NOTE — NURSING NOTE
Discharge instructions reviewed with patient. Patient instructed not to take Tylenol and Lortab together. May alternate if desired every 6 hours. Patient verbalized understanding. RN informed that MD believes this to be normal post operative pain and medications should help. RN informed patient that extra hydration and OTC Colace may help with constipation caused by narcotics.

## 2020-04-13 ENCOUNTER — TELEPHONE (OUTPATIENT)
Dept: OBSTETRICS AND GYNECOLOGY | Facility: CLINIC | Age: 25
End: 2020-04-13

## 2020-04-13 NOTE — TELEPHONE ENCOUNTER
Patient left a vm on 04/10/2020 that she had surgery in [place] and they called to check on her and was in pain. They told her to contact her OBGYN office . Called the pt and she states she went to the ER and she doesn't need to come in now. She will come to her next appointment on 04/22/2020 @11:15am

## 2020-04-20 ENCOUNTER — TELEPHONE (OUTPATIENT)
Dept: OBSTETRICS AND GYNECOLOGY | Facility: CLINIC | Age: 25
End: 2020-04-20

## 2020-04-20 NOTE — TELEPHONE ENCOUNTER
[facility] CALLED WITH AN UPDATE ON THE PATIENT AND WOULD LIKE YOU CALL THEM BACK..ASK FOR MEKA [phone]

## 2020-04-22 ENCOUNTER — ROUTINE PRENATAL (OUTPATIENT)
Dept: OBSTETRICS AND GYNECOLOGY | Facility: CLINIC | Age: 25
End: 2020-04-22

## 2020-04-22 VITALS — BODY MASS INDEX: 38.86 KG/M2 | SYSTOLIC BLOOD PRESSURE: 120 MMHG | WEIGHT: 255.6 LBS | DIASTOLIC BLOOD PRESSURE: 70 MMHG

## 2020-04-22 DIAGNOSIS — F12.11 MARIJUANA ABUSE IN REMISSION: ICD-10-CM

## 2020-04-22 DIAGNOSIS — Z98.890 POSTOPERATIVE STATE: ICD-10-CM

## 2020-04-22 DIAGNOSIS — O99.331 TOBACCO SMOKING AFFECTING PREGNANCY IN FIRST TRIMESTER: ICD-10-CM

## 2020-04-22 DIAGNOSIS — Z36.2 ENCOUNTER FOR OTHER ANTENATAL SCREENING FOLLOW-UP: ICD-10-CM

## 2020-04-22 DIAGNOSIS — O20.9 BLEEDING IN EARLY PREGNANCY: ICD-10-CM

## 2020-04-22 DIAGNOSIS — O99.321 DRUG USE AFFECTING PREGNANCY IN FIRST TRIMESTER: ICD-10-CM

## 2020-04-22 DIAGNOSIS — Z3A.22 22 WEEKS GESTATION OF PREGNANCY: ICD-10-CM

## 2020-04-22 DIAGNOSIS — O09.42 SUPERVISION OF HIGH-RISK PREGNANCY WITH GRAND MULTIPARITY IN SECOND TRIMESTER: Primary | ICD-10-CM

## 2020-04-22 PROCEDURE — 99213 OFFICE O/P EST LOW 20 MIN: CPT | Performed by: OBSTETRICS & GYNECOLOGY

## 2020-04-22 RX ORDER — OMEGA-3S/DHA/EPA/FISH OIL 1000-1400
1 CAPSULE,DELAYED RELEASE (ENTERIC COATED) ORAL DAILY
COMMUNITY
End: 2020-04-22

## 2020-04-22 RX ORDER — ONDANSETRON 4 MG/1
4 TABLET, FILM COATED ORAL EVERY 8 HOURS PRN
COMMUNITY
Start: 2020-04-07 | End: 2020-04-22

## 2020-04-22 RX ORDER — HYDROCODONE BITARTRATE AND ACETAMINOPHEN 5; 325 MG/1; MG/1
1 TABLET ORAL EVERY 6 HOURS PRN
COMMUNITY
Start: 2020-04-07 | End: 2020-04-22

## 2020-04-22 NOTE — ASSESSMENT & PLAN NOTE
Patient status post 4/6/2020 laparoscopic LSO removal turned out to be serous cystadenoma for pelvic mass felt to be torsed   No

## 2020-04-22 NOTE — PROGRESS NOTES
"CC: Prenatal visit    Dliia Ray is a 24 y.o.  at 22w5d.  Doing well.  No complaints.  Denies contractions, LOF, or VB.  Reports good FM.    /70   Wt 116 kg (255 lb 9.6 oz)   LMP 10/22/2019 (Approximate)   BMI 38.86 kg/m²   SVE: Not done           Problems (from 20 to present)     Problem Noted Resolved    Postoperative state 2020 by Kale Avila MD No    Priority:  High      Pelvic mass during pregnancy 3/23/2020 by Kale Avila MD No    Priority:  High      Supervision of high-risk pregnancy with grand multiparity in second trimester 3/11/2020 by Guille Lopes DO No    Overview Signed 3/11/2020  3:10 PM by Guille Lopes DO     Patient with 2 large ovarian cysts both measuring 9 cm we will continue to follow closely per [facility] recommendations.         Ovarian cyst, left 2020 by Guille Lopes DO No    Overview Signed 2020  3:43 PM by Guille Lopes DO     Patient had ultrasound at 15 weeks which showed 2 left ovarian cysts one measuring 9 x 6 x 8, the other measuring 8 x 6 x 7.         Marijuana abuse in remission 2020 by Zahraa Sanchez APRN No    Overview Signed 2020  1:33 PM by Zahraa Sanchez APRN     Quit smoking on          Drug use affecting pregnancy in first trimester 2020 by Zahraa Sanchez APRN No    Overview Signed 2020  1:33 PM by Zahraa Sanchez APRN     Quit smoking on          Bleeding in early pregnancy 2020 by Zahraa Sanchez APRN No    Overview Signed 2020  1:35 PM by Zahraa Sanchez APRN     + BV at time of NOB  14cm right ovarian cyst  Pt states it is improving as of ; precautions reviewed.          Tobacco smoking affecting pregnancy in first trimester 2020 by Zahraa Sanchez APRN No    Overview Signed 2020  2:46 PM by Zahraa Sanchez APRN     3-5 per day \"trying to quit\"         Complex cyst of right ovary 2020 by Zahraa Sanchez, APRN 2020 by " Guille Lopes,     Overview Signed 2020  1:32 PM by Zahraa Sanchez, APRN     14cm; u/s f/u on                A/P: Dilia Ray is a 24 y.o.  at 22w5d.  - RTC in 3 weeks  - Reviewed COVID-19 visitation policy  - Reviewed COVID-19 precautions     Diagnosis Plan   1. Supervision of high-risk pregnancy with grand multiparity in second trimester     2. Marijuana abuse in remission     3. Drug use affecting pregnancy in first trimester     4. Bleeding in early pregnancy     5. Tobacco smoking affecting pregnancy in first trimester     6. Postoperative state   patient had laparoscopy and removal turned out to be a serous cystadenoma she is healing well port sites healing well she had some pain in the few days after surgery but doing well now without problems   7. 22 weeks gestation of pregnancy     8. Encounter for other  screening follow-up       Kale Avila MD  2020  13:27

## 2020-04-23 DIAGNOSIS — O99.331 TOBACCO SMOKING AFFECTING PREGNANCY IN FIRST TRIMESTER: ICD-10-CM

## 2020-04-23 DIAGNOSIS — O99.321 DRUG USE AFFECTING PREGNANCY IN FIRST TRIMESTER: Primary | ICD-10-CM

## 2020-05-29 ENCOUNTER — ROUTINE PRENATAL (OUTPATIENT)
Dept: OBSTETRICS AND GYNECOLOGY | Facility: CLINIC | Age: 25
End: 2020-05-29

## 2020-05-29 VITALS — BODY MASS INDEX: 40.99 KG/M2 | SYSTOLIC BLOOD PRESSURE: 124 MMHG | DIASTOLIC BLOOD PRESSURE: 76 MMHG | WEIGHT: 269.6 LBS

## 2020-05-29 DIAGNOSIS — O99.331 TOBACCO SMOKING AFFECTING PREGNANCY IN FIRST TRIMESTER: ICD-10-CM

## 2020-05-29 DIAGNOSIS — N83.202 OVARIAN CYST, LEFT: ICD-10-CM

## 2020-05-29 DIAGNOSIS — O09.42 SUPERVISION OF HIGH-RISK PREGNANCY WITH GRAND MULTIPARITY IN SECOND TRIMESTER: ICD-10-CM

## 2020-05-29 DIAGNOSIS — F12.11 MARIJUANA ABUSE IN REMISSION: ICD-10-CM

## 2020-05-29 DIAGNOSIS — R19.00 PELVIC MASS DURING PREGNANCY: ICD-10-CM

## 2020-05-29 DIAGNOSIS — O20.9 BLEEDING IN EARLY PREGNANCY: ICD-10-CM

## 2020-05-29 DIAGNOSIS — O09.33 INSUFFICIENT PRENATAL CARE IN THIRD TRIMESTER: ICD-10-CM

## 2020-05-29 DIAGNOSIS — O99.321 DRUG USE AFFECTING PREGNANCY IN FIRST TRIMESTER: ICD-10-CM

## 2020-05-29 DIAGNOSIS — O26.899 PELVIC MASS DURING PREGNANCY: ICD-10-CM

## 2020-05-29 DIAGNOSIS — Z3A.28 28 WEEKS GESTATION OF PREGNANCY: Primary | ICD-10-CM

## 2020-05-29 DIAGNOSIS — Z98.890 POSTOPERATIVE STATE: ICD-10-CM

## 2020-05-29 PROCEDURE — 99213 OFFICE O/P EST LOW 20 MIN: CPT | Performed by: OBSTETRICS & GYNECOLOGY

## 2020-05-29 RX ORDER — DOCUSATE SODIUM 100 MG/1
100 CAPSULE, LIQUID FILLED ORAL DAILY
COMMUNITY
Start: 2020-04-14 | End: 2020-08-12

## 2020-05-30 NOTE — PROGRESS NOTES
"CC: Prenatal visit    Dilia Ray is a 24 y.o.  at 28w0d.  Doing well.  Denies contractions, LOF, or VB.  Reports good FM.    /76   Wt 122 kg (269 lb 9.6 oz)   LMP 10/22/2019 (Approximate)   BMI 40.99 kg/m²   SVE: Not done  Fundal Height (cm): 29 cm  Fetal Heart Rate: 150     Problems (from 20 to present)     Problem Noted Resolved    Postoperative state 2020 by Kale Avila MD No    Priority:  High      Pelvic mass during pregnancy 3/23/2020 by Kale Avila MD No    Priority:  High      Supervision of high-risk pregnancy with grand multiparity in second trimester 3/11/2020 by Guille Lopes DO No    Overview Signed 3/11/2020  3:10 PM by Guille Lopes DO     Patient with 2 large ovarian cysts both measuring 9 cm we will continue to follow closely per [facility] recommendations.         Ovarian cyst, left 2020 by Guille Lopes DO No    Overview Signed 2020  3:43 PM by Guille Lopes DO     Patient had ultrasound at 15 weeks which showed 2 left ovarian cysts one measuring 9 x 6 x 8, the other measuring 8 x 6 x 7.         Marijuana abuse in remission 2020 by Zahraa Sanchez APRN No    Overview Signed 2020  1:33 PM by Zahraa Sanchez APRN     Quit smoking on          Drug use affecting pregnancy in first trimester 2020 by Zahraa Sanchez APRN No    Overview Signed 2020  1:33 PM by Zahraa Sanchez APRN     Quit smoking on          Bleeding in early pregnancy 2020 by Zahraa Sanchez APRN No    Overview Signed 2020  1:35 PM by Zahraa Sanchez APRN     + BV at time of NOB  14cm right ovarian cyst  Pt states it is improving as of ; precautions reviewed.          Tobacco smoking affecting pregnancy in first trimester 2020 by Zahraa Sanchez APRN No    Overview Signed 2020  2:46 PM by Cook, Zahraa W, APRN     3-5 per day \"trying to quit\"         Complex cyst of right ovary 2020 by Daniel, " Zahraa PAZ, KIM 2020 by Guille Lopes DO    Overview Signed 2020  1:32 PM by Zahraa Sanchez, APRN     14cm; u/s f/u on                A/P: Dilia Ray is a 24 y.o.  at 28w0d.  - RTC in 3days  - Reviewed COVID-19 visitation policy  - Reviewed COVID-19 precautions     Diagnosis Plan   1. 28 weeks gestation of pregnancy  Glucose, Post 50 Gm Glucola    Hemoglobin A1c   2. Postoperative state     3. Pelvic mass during pregnancy   status post removal in [place] was benign   4. Supervision of high-risk pregnancy with grand multiparity in second trimester     5. Ovarian cyst, left     6. Marijuana abuse in remission     7. Drug use affecting pregnancy in first trimester     8. Bleeding in early pregnancy     9. Tobacco smoking affecting pregnancy in first trimester     10. Insufficient prenatal care in third trimester   we have not seen the patient since 22 weeks importance of regular follow-up reviewed.  We need to get 3-hour Glucola repeat because first 1 was early high risk secondary to habitus     Kale Avila MD  2020  20:46

## 2020-06-02 DIAGNOSIS — Z36.2 ENCOUNTER FOR OTHER ANTENATAL SCREENING FOLLOW-UP: Primary | ICD-10-CM

## 2020-06-03 ENCOUNTER — ROUTINE PRENATAL (OUTPATIENT)
Dept: OBSTETRICS AND GYNECOLOGY | Facility: CLINIC | Age: 25
End: 2020-06-03

## 2020-06-03 VITALS — SYSTOLIC BLOOD PRESSURE: 112 MMHG | DIASTOLIC BLOOD PRESSURE: 62 MMHG | BODY MASS INDEX: 41.81 KG/M2 | WEIGHT: 275 LBS

## 2020-06-03 DIAGNOSIS — O99.331 TOBACCO SMOKING AFFECTING PREGNANCY IN FIRST TRIMESTER: ICD-10-CM

## 2020-06-03 DIAGNOSIS — Z91.89 AT RISK FOR GESTATIONAL DIABETES MELLITUS: Primary | ICD-10-CM

## 2020-06-03 DIAGNOSIS — Z98.890 POSTOPERATIVE STATE: ICD-10-CM

## 2020-06-03 DIAGNOSIS — R19.00 PELVIC MASS DURING PREGNANCY: ICD-10-CM

## 2020-06-03 DIAGNOSIS — F12.11 MARIJUANA ABUSE IN REMISSION: ICD-10-CM

## 2020-06-03 DIAGNOSIS — O09.42 SUPERVISION OF HIGH-RISK PREGNANCY WITH GRAND MULTIPARITY IN SECOND TRIMESTER: ICD-10-CM

## 2020-06-03 DIAGNOSIS — O99.321 DRUG USE AFFECTING PREGNANCY IN FIRST TRIMESTER: ICD-10-CM

## 2020-06-03 DIAGNOSIS — O26.899 PELVIC MASS DURING PREGNANCY: ICD-10-CM

## 2020-06-03 DIAGNOSIS — N83.202 OVARIAN CYST, LEFT: ICD-10-CM

## 2020-06-03 DIAGNOSIS — O20.9 BLEEDING IN EARLY PREGNANCY: ICD-10-CM

## 2020-06-03 PROCEDURE — 99213 OFFICE O/P EST LOW 20 MIN: CPT | Performed by: OBSTETRICS & GYNECOLOGY

## 2020-06-03 RX ORDER — PRENATAL VIT/IRON FUM/FOLIC AC 27MG-0.8MG
1 TABLET ORAL DAILY
COMMUNITY
End: 2020-10-09

## 2020-06-03 NOTE — PROGRESS NOTES
"CC: Prenatal visit    Dilia Ray is a 24 y.o.  at 28w5d.  Doing well.  Denies contractions, LOF, or VB.  Reports good FM.    Wt 125 kg (275 lb)   LMP 10/22/2019 (Approximate)   BMI 41.81 kg/m²   SVE: Not done           Problems (from 20 to present)     Problem Noted Resolved    Postoperative state 2020 by Kale Avila MD No    Priority:  High      Pelvic mass during pregnancy 3/23/2020 by Kale Avila MD No    Priority:  High      Supervision of high-risk pregnancy with grand multiparity in second trimester 3/11/2020 by Guille Lopes DO No    Overview Signed 3/11/2020  3:10 PM by Guille Lopes DO     Patient with 2 large ovarian cysts both measuring 9 cm we will continue to follow closely per [facility] recommendations.         Ovarian cyst, left 2020 by Guille Lopes DO No    Overview Signed 2020  3:43 PM by Guille Lopes DO     Patient had ultrasound at 15 weeks which showed 2 left ovarian cysts one measuring 9 x 6 x 8, the other measuring 8 x 6 x 7.         Marijuana abuse in remission 2020 by Zahraa Sanchez APRN No    Overview Signed 2020  1:33 PM by Zahraa Sanchez APRN     Quit smoking on          Drug use affecting pregnancy in first trimester 2020 by Zahraa Sanchez APRN No    Overview Signed 2020  1:33 PM by Zahraa Sanchez APRN     Quit smoking on          Bleeding in early pregnancy 2020 by Zahraa Sanchez APRN No    Overview Signed 2020  1:35 PM by Zahraa Sanchez APRN     + BV at time of NOB  14cm right ovarian cyst  Pt states it is improving as of ; precautions reviewed.          Tobacco smoking affecting pregnancy in first trimester 2020 by Zahraa Sanchez APRN No    Overview Signed 2020  2:46 PM by Zahraa Sanchez APRN     3-5 per day \"trying to quit\"         Complex cyst of right ovary 2020 by Zahraa Sanchez APRN 2020 by Guille Lopes, DO    Overview " Signed 2020  1:32 PM by Zahraa Sanchez APRN     14cm; u/s f/u on                A/P: Dilia Ray is a 24 y.o.  at 28w5d.  - RTC in 2 weeks  - Reviewed COVID-19 visitation policy  - Reviewed COVID-19 precautions     Diagnosis Plan   1. At risk for gestational diabetes mellitus   at risk for gestational diabetes secondary to obesity.  She had an early Glucola that was okay I will plan to repeat   2. Postoperative state     3. Pelvic mass during pregnancy   that is post removal in [place] that was benign   4. Supervision of high-risk pregnancy with grand multiparity in second trimester     5. Ovarian cyst, left     6. Marijuana abuse in remission     7. Drug use affecting pregnancy in first trimester     8. Bleeding in early pregnancy     9. Tobacco smoking affecting pregnancy in first trimester       Kale Avila MD  6/3/2020  17:38

## 2020-06-15 ENCOUNTER — LAB (OUTPATIENT)
Dept: LAB | Facility: HOSPITAL | Age: 25
End: 2020-06-15

## 2020-06-15 ENCOUNTER — TELEPHONE (OUTPATIENT)
Dept: OBSTETRICS AND GYNECOLOGY | Facility: CLINIC | Age: 25
End: 2020-06-15

## 2020-06-15 ENCOUNTER — ROUTINE PRENATAL (OUTPATIENT)
Dept: OBSTETRICS AND GYNECOLOGY | Facility: CLINIC | Age: 25
End: 2020-06-15

## 2020-06-15 VITALS — SYSTOLIC BLOOD PRESSURE: 118 MMHG | DIASTOLIC BLOOD PRESSURE: 74 MMHG | WEIGHT: 277.6 LBS | BODY MASS INDEX: 42.21 KG/M2

## 2020-06-15 DIAGNOSIS — O99.331 TOBACCO SMOKING AFFECTING PREGNANCY IN FIRST TRIMESTER: ICD-10-CM

## 2020-06-15 DIAGNOSIS — Z98.890 POSTOPERATIVE STATE: ICD-10-CM

## 2020-06-15 DIAGNOSIS — O20.9 BLEEDING IN EARLY PREGNANCY: ICD-10-CM

## 2020-06-15 DIAGNOSIS — F12.11 MARIJUANA ABUSE IN REMISSION: ICD-10-CM

## 2020-06-15 DIAGNOSIS — N83.202 OVARIAN CYST, LEFT: ICD-10-CM

## 2020-06-15 DIAGNOSIS — Z36.2 ENCOUNTER FOR OTHER ANTENATAL SCREENING FOLLOW-UP: ICD-10-CM

## 2020-06-15 DIAGNOSIS — R19.00 PELVIC MASS DURING PREGNANCY: ICD-10-CM

## 2020-06-15 DIAGNOSIS — O26.899 PELVIC MASS DURING PREGNANCY: ICD-10-CM

## 2020-06-15 DIAGNOSIS — Z3A.30 30 WEEKS GESTATION OF PREGNANCY: Primary | ICD-10-CM

## 2020-06-15 DIAGNOSIS — Z3A.28 28 WEEKS GESTATION OF PREGNANCY: ICD-10-CM

## 2020-06-15 DIAGNOSIS — O99.321 DRUG USE AFFECTING PREGNANCY IN FIRST TRIMESTER: ICD-10-CM

## 2020-06-15 DIAGNOSIS — O09.43 SUPERVISION OF HIGH-RISK PREGNANCY WITH GRAND MULTIPARITY IN THIRD TRIMESTER: ICD-10-CM

## 2020-06-15 LAB
GLUCOSE 1H P 100 G GLC PO SERPL-MCNC: 90 MG/DL (ref 60–140)
HBA1C MFR BLD: 5 % (ref 4.8–5.6)

## 2020-06-15 PROCEDURE — 83036 HEMOGLOBIN GLYCOSYLATED A1C: CPT

## 2020-06-15 PROCEDURE — 99213 OFFICE O/P EST LOW 20 MIN: CPT | Performed by: OBSTETRICS & GYNECOLOGY

## 2020-06-15 PROCEDURE — 82950 GLUCOSE TEST: CPT

## 2020-06-15 PROCEDURE — 36415 COLL VENOUS BLD VENIPUNCTURE: CPT

## 2020-06-16 PROBLEM — Z3A.30 30 WEEKS GESTATION OF PREGNANCY: Status: ACTIVE | Noted: 2020-06-16

## 2020-06-16 NOTE — PROGRESS NOTES
"CC: Prenatal visit    Dilia Ray is a 24 y.o.  at 30w4d.  Doing well.  Denies contractions, LOF, or VB.  Reports good FM.    /74   Wt 126 kg (277 lb 9.6 oz)   LMP 10/22/2019 (Approximate)   BMI 42.21 kg/m²   SVE: Not done  Fundal Height (cm): 31 cm  Fetal Heart Rate: 160     Problems (from 20 to present)     Problem Noted Resolved    Postoperative state 2020 by Kale Avila MD No    Priority:  High      Pelvic mass during pregnancy 3/23/2020 by Kale Avila MD No    Priority:  High      Supervision of high-risk pregnancy with grand multiparity in second trimester 3/11/2020 by Guille Lopes DO No    Overview Signed 3/11/2020  3:10 PM by Guille Lopes DO     Patient with 2 large ovarian cysts both measuring 9 cm we will continue to follow closely per [facility] recommendations.         Ovarian cyst, left 2020 by Guille Lopes DO No    Overview Signed 2020  3:43 PM by Guille Lopes DO     Patient had ultrasound at 15 weeks which showed 2 left ovarian cysts one measuring 9 x 6 x 8, the other measuring 8 x 6 x 7.         Marijuana abuse in remission 2020 by Zahraa Sanchez APRN No    Overview Signed 2020  1:33 PM by Zahraa Sanchez APRN     Quit smoking on          Drug use affecting pregnancy in first trimester 2020 by Zahraa Sanchez APRN No    Overview Signed 2020  1:33 PM by Zahraa Sanchez APRN     Quit smoking on          Bleeding in early pregnancy 2020 by Zahraa Sanchez APRN No    Overview Signed 2020  1:35 PM by Zahraa Sanchez APRN     + BV at time of NOB  14cm right ovarian cyst  Pt states it is improving as of ; precautions reviewed.          Tobacco smoking affecting pregnancy in first trimester 2020 by Zahraa Sanchez APRN No    Overview Signed 2020  2:46 PM by Cook, Zahraa W, APRN     3-5 per day \"trying to quit\"         Complex cyst of right ovary 2020 by Daniel, " Zahraa PAZ, KIM 2020 by Guille Lopes DO    Overview Signed 2020  1:32 PM by Zahraa Sanchez, APRN     14cm; u/s f/u on                A/P: Dilia Ray is a 24 y.o.  at 30w4d.  - RTC in 2 weeks  - Reviewed COVID-19 visitation policy  - Reviewed COVID-19 precautions     Diagnosis Plan   1. 30 weeks gestation of pregnancy     2. Postoperative state     3. Pelvic mass during pregnancy     4. Supervision of high-risk pregnancy with grand multiparity in third trimester     5. Ovarian cyst, left     6. Marijuana abuse in remission     7. Drug use affecting pregnancy in first trimester     8. Bleeding in early pregnancy     9. Tobacco smoking affecting pregnancy in first trimester       Kale Avila MD  2020  15:04

## 2020-06-29 ENCOUNTER — ROUTINE PRENATAL (OUTPATIENT)
Dept: OBSTETRICS AND GYNECOLOGY | Facility: CLINIC | Age: 25
End: 2020-06-29

## 2020-06-29 VITALS — WEIGHT: 278 LBS | BODY MASS INDEX: 42.27 KG/M2 | DIASTOLIC BLOOD PRESSURE: 62 MMHG | SYSTOLIC BLOOD PRESSURE: 122 MMHG

## 2020-06-29 DIAGNOSIS — O26.899 PELVIC MASS DURING PREGNANCY: ICD-10-CM

## 2020-06-29 DIAGNOSIS — R19.00 PELVIC MASS DURING PREGNANCY: ICD-10-CM

## 2020-06-29 DIAGNOSIS — O99.333 TOBACCO SMOKING AFFECTING PREGNANCY IN THIRD TRIMESTER: ICD-10-CM

## 2020-06-29 DIAGNOSIS — IMO0002 EVALUATE ANATOMY NOT SEEN ON PRIOR SONOGRAM: ICD-10-CM

## 2020-06-29 DIAGNOSIS — O09.93 SUPERVISION OF HIGH RISK PREGNANCY IN THIRD TRIMESTER: ICD-10-CM

## 2020-06-29 DIAGNOSIS — Z98.890 POSTOPERATIVE STATE: ICD-10-CM

## 2020-06-29 DIAGNOSIS — N83.202 OVARIAN CYST, LEFT: ICD-10-CM

## 2020-06-29 DIAGNOSIS — O99.210 OBESITY IN PREGNANCY, ANTEPARTUM: ICD-10-CM

## 2020-06-29 DIAGNOSIS — F12.11 MARIJUANA ABUSE IN REMISSION: ICD-10-CM

## 2020-06-29 DIAGNOSIS — Z3A.32 32 WEEKS GESTATION OF PREGNANCY: Primary | ICD-10-CM

## 2020-06-29 PROBLEM — Z3A.20 20 WEEKS GESTATION OF PREGNANCY: Status: RESOLVED | Noted: 2020-04-03 | Resolved: 2020-06-29

## 2020-06-29 PROBLEM — Z3A.18 18 WEEKS GESTATION OF PREGNANCY: Status: RESOLVED | Noted: 2020-03-23 | Resolved: 2020-06-29

## 2020-06-29 PROBLEM — Z3A.30 30 WEEKS GESTATION OF PREGNANCY: Status: RESOLVED | Noted: 2020-06-16 | Resolved: 2020-06-29

## 2020-06-29 PROBLEM — Z3A.28 28 WEEKS GESTATION OF PREGNANCY: Status: RESOLVED | Noted: 2020-05-29 | Resolved: 2020-06-29

## 2020-06-29 PROBLEM — Z3A.22 22 WEEKS GESTATION OF PREGNANCY: Status: RESOLVED | Noted: 2020-04-22 | Resolved: 2020-06-29

## 2020-06-29 PROBLEM — Z91.89 AT RISK FOR GESTATIONAL DIABETES MELLITUS: Status: RESOLVED | Noted: 2020-06-03 | Resolved: 2020-06-29

## 2020-06-29 PROCEDURE — 99213 OFFICE O/P EST LOW 20 MIN: CPT | Performed by: NURSE PRACTITIONER

## 2020-06-29 NOTE — PROGRESS NOTES
"CC: Prenatal visit    Dilia Ray is a 24 y.o.  at 32w3d.  Doing well.  No complaints.  Denies contractions, LOF, or VB.  Reports good FM.    /62   Wt 126 kg (278 lb)   LMP 10/22/2019 (Approximate)   BMI 42.27 kg/m²     Fundal Height (cm): 33 cm  Fetal Heart Rate: 140 us     Problems (from 20 to present)     Problem Noted Resolved    Postoperative state 2020 by Kale Avila MD No    Pelvic mass during pregnancy 3/23/2020 by Kale Avila MD No    Supervision of high-risk pregnancy with grand multiparity in second trimester 3/11/2020 by Guille Lopes DO No    Overview Signed 3/11/2020  3:10 PM by Guille Lopes DO     Patient with 2 large ovarian cysts both measuring 9 cm we will continue to follow closely per [facility] recommendations.         Ovarian cyst, left 2020 by Guille Lopes DO No    Overview Signed 2020  3:43 PM by Guille Lopes DO     Patient had ultrasound at 15 weeks which showed 2 left ovarian cysts one measuring 9 x 6 x 8, the other measuring 8 x 6 x 7.         Marijuana abuse in remission 2020 by Zahraa Sanchez APRN No    Overview Signed 2020  1:33 PM by Zahraa Sanchez APRN     Quit smoking on          Drug use affecting pregnancy in first trimester 2020 by Zahraa Sanchez APRN No    Overview Signed 2020  1:33 PM by Zahraa Sanchez APRN     Quit smoking on          Bleeding in early pregnancy 2020 by Zahraa Sanchez APRN No    Overview Signed 2020  1:35 PM by Zahraa Sanchez APRN     + BV at time of NOB  14cm right ovarian cyst  Pt states it is improving as of ; precautions reviewed.          Tobacco smoking affecting pregnancy in first trimester 2020 by Zahraa Sanchez APRN No    Overview Signed 2020  2:46 PM by Zahraa Sanchez APRN     3-5 per day \"trying to quit\"         Complex cyst of right ovary 2020 by Zahraa Sanchez, APRN 2020 by Moses, " Guille MCCURDY,     Overview Signed 2020  1:32 PM by Zahraa Sanchez, APRN     14cm; u/s f/u on                A/P: Dilia Ray is a 24 y.o.  at 32w3d.  - RTC in 1 weeks for f/u TAUS fetal growth, RVOT sub optimal, and BPP for maternal obesity     Diagnosis Plan   1. 32 weeks gestation of pregnancy     2. Supervision of high risk pregnancy in third trimester  US Fetal Biophysical Profile;Without Non-Stress Testing    US Ob Follow Up Transabdominal Approach   3. Obesity in pregnancy, antepartum  US Fetal Biophysical Profile;Without Non-Stress Testing   4. Tobacco smoking affecting pregnancy in third trimester     5. Postoperative state     6. Pelvic mass during pregnancy     7. Ovarian cyst, left     8. Marijuana abuse in remission     9. Evaluate anatomy not seen on prior sonogram  US Ob Follow Up Transabdominal Approach               KIM Hennessy  2020  11:17

## 2020-07-06 ENCOUNTER — TELEPHONE (OUTPATIENT)
Dept: OBSTETRICS AND GYNECOLOGY | Facility: CLINIC | Age: 25
End: 2020-07-06

## 2020-07-06 ENCOUNTER — ROUTINE PRENATAL (OUTPATIENT)
Dept: OBSTETRICS AND GYNECOLOGY | Facility: CLINIC | Age: 25
End: 2020-07-06

## 2020-07-06 VITALS — BODY MASS INDEX: 43.15 KG/M2 | WEIGHT: 283.8 LBS | SYSTOLIC BLOOD PRESSURE: 122 MMHG | DIASTOLIC BLOOD PRESSURE: 68 MMHG

## 2020-07-06 DIAGNOSIS — O99.333 TOBACCO SMOKING AFFECTING PREGNANCY IN THIRD TRIMESTER: ICD-10-CM

## 2020-07-06 DIAGNOSIS — N83.202 OVARIAN CYST, LEFT: ICD-10-CM

## 2020-07-06 DIAGNOSIS — F12.11 MARIJUANA ABUSE IN REMISSION: ICD-10-CM

## 2020-07-06 DIAGNOSIS — O26.899 PELVIC MASS DURING PREGNANCY: ICD-10-CM

## 2020-07-06 DIAGNOSIS — Z98.890 POSTOPERATIVE STATE: ICD-10-CM

## 2020-07-06 DIAGNOSIS — O20.9 BLEEDING IN EARLY PREGNANCY: ICD-10-CM

## 2020-07-06 DIAGNOSIS — Z3A.33 33 WEEKS GESTATION OF PREGNANCY: ICD-10-CM

## 2020-07-06 DIAGNOSIS — O99.321 DRUG USE AFFECTING PREGNANCY IN FIRST TRIMESTER: ICD-10-CM

## 2020-07-06 DIAGNOSIS — O09.93 HIGH-RISK PREGNANCY IN THIRD TRIMESTER: ICD-10-CM

## 2020-07-06 DIAGNOSIS — R19.00 PELVIC MASS DURING PREGNANCY: ICD-10-CM

## 2020-07-06 DIAGNOSIS — N89.8 VAGINAL DISCHARGE: Primary | ICD-10-CM

## 2020-07-06 LAB
CANDIDA ALBICANS: NEGATIVE
GARDNERELLA VAGINALIS: NEGATIVE
T VAGINALIS DNA VAG QL PROBE+SIG AMP: NEGATIVE

## 2020-07-06 PROCEDURE — 87480 CANDIDA DNA DIR PROBE: CPT | Performed by: OBSTETRICS & GYNECOLOGY

## 2020-07-06 PROCEDURE — 99213 OFFICE O/P EST LOW 20 MIN: CPT | Performed by: OBSTETRICS & GYNECOLOGY

## 2020-07-06 PROCEDURE — 87510 GARDNER VAG DNA DIR PROBE: CPT | Performed by: OBSTETRICS & GYNECOLOGY

## 2020-07-06 PROCEDURE — 87660 TRICHOMONAS VAGIN DIR PROBE: CPT | Performed by: OBSTETRICS & GYNECOLOGY

## 2020-07-06 NOTE — TELEPHONE ENCOUNTER
Tried to reach patient with results of vaginitis panel she said she would be available got voicemail not set up

## 2020-07-07 PROBLEM — N89.8 VAGINAL DISCHARGE: Status: ACTIVE | Noted: 2020-07-07

## 2020-07-07 PROBLEM — Z3A.33 33 WEEKS GESTATION OF PREGNANCY: Status: ACTIVE | Noted: 2020-07-07

## 2020-07-07 NOTE — PROGRESS NOTES
"CC: Prenatal visit    Dilia Ray is a 24 y.o.  at 33w4d.  Doing well.  Denies contractions, LOF, or VB.  Reports good FM.    /68   Wt 129 kg (283 lb 12.8 oz)   LMP 10/22/2019 (Approximate)   BMI 43.15 kg/m²   SVE: Not done  Fundal Height (cm): 34 cm  Fetal Heart Rate: 149     Problems (from 20 to present)     Problem Noted Resolved    Postoperative state 2020 by Kale Avila MD No    Priority:  High      Pelvic mass during pregnancy 3/23/2020 by Kale Avila MD No    Priority:  High      High-risk pregnancy in third trimester 3/11/2020 by Guille Lopes DO No    Overview Signed 3/11/2020  3:10 PM by Guille Lopes DO     Patient with 2 large ovarian cysts both measuring 9 cm we will continue to follow closely per [facility] recommendations.         Ovarian cyst, left 2020 by Guille Lopes DO No    Overview Signed 2020  3:43 PM by Guille Lopes DO     Patient had ultrasound at 15 weeks which showed 2 left ovarian cysts one measuring 9 x 6 x 8, the other measuring 8 x 6 x 7.         Marijuana abuse in remission 2020 by Zahraa Sanchez APRN No    Overview Signed 2020  1:33 PM by Zahraa Sanchez APRN     Quit smoking on          Drug use affecting pregnancy in first trimester 2020 by Zahraa Sanchez APRN No    Overview Signed 2020  1:33 PM by Zahraa Sanchez APRN     Quit smoking on          Bleeding in early pregnancy 2020 by Zahraa Sanchez APRN No    Overview Signed 2020  1:35 PM by Zahraa Sanchez APRN     + BV at time of NOB  14cm right ovarian cyst  Pt states it is improving as of ; precautions reviewed.          Tobacco smoking affecting pregnancy in third trimester 2020 by Zahraa Sanchez APRN No    Overview Signed 2020  2:46 PM by Zahraa Sanchez APRN     3-5 per day \"trying to quit\"         Complex cyst of right ovary 2020 by Zahraa Sanchez, APRN 2020 by Moses, " Guille MCCURDY DO    Overview Signed 2020  1:32 PM by Zahraa Sanchez APRN     14cm; u/s f/u on                A/P: Dilia Ray is a 24 y.o.  at 33w4d.  - RTC in 1 weeks  - Reviewed COVID-19 visitation policy  - Reviewed COVID-19 precautions     Diagnosis Plan   1. Vaginal discharge  Gardnerella vaginalis, Trichomonas vaginalis, Candida albicans, DNA - Swab, Vagina   2. Postoperative state     3. Pelvic mass during pregnancy     4. High-risk pregnancy in third trimester     5. Ovarian cyst, left     6. Marijuana abuse in remission     7. Drug use affecting pregnancy in first trimester     8. Bleeding in early pregnancy     9. Tobacco smoking affecting pregnancy in third trimester     10. 33 weeks gestation of pregnancy       Kale Avila MD  2020  17:35

## 2020-07-14 DIAGNOSIS — O09.93 SUPERVISION OF HIGH RISK PREGNANCY IN THIRD TRIMESTER: ICD-10-CM

## 2020-07-14 DIAGNOSIS — IMO0002 EVALUATE ANATOMY NOT SEEN ON PRIOR SONOGRAM: ICD-10-CM

## 2020-07-20 ENCOUNTER — ROUTINE PRENATAL (OUTPATIENT)
Dept: OBSTETRICS AND GYNECOLOGY | Facility: CLINIC | Age: 25
End: 2020-07-20

## 2020-07-20 VITALS — WEIGHT: 290 LBS | SYSTOLIC BLOOD PRESSURE: 102 MMHG | BODY MASS INDEX: 44.09 KG/M2 | DIASTOLIC BLOOD PRESSURE: 70 MMHG

## 2020-07-20 DIAGNOSIS — O09.93 HIGH-RISK PREGNANCY IN THIRD TRIMESTER: ICD-10-CM

## 2020-07-20 DIAGNOSIS — O99.321 DRUG USE AFFECTING PREGNANCY IN FIRST TRIMESTER: ICD-10-CM

## 2020-07-20 DIAGNOSIS — Z3A.35 35 WEEKS GESTATION OF PREGNANCY: ICD-10-CM

## 2020-07-20 DIAGNOSIS — O26.899 PELVIC MASS DURING PREGNANCY: ICD-10-CM

## 2020-07-20 DIAGNOSIS — F12.11 MARIJUANA ABUSE IN REMISSION: ICD-10-CM

## 2020-07-20 DIAGNOSIS — Z98.890 POSTOPERATIVE STATE: ICD-10-CM

## 2020-07-20 DIAGNOSIS — O36.8130 DECREASED FETAL MOVEMENTS IN THIRD TRIMESTER, SINGLE OR UNSPECIFIED FETUS: ICD-10-CM

## 2020-07-20 DIAGNOSIS — O99.333 TOBACCO SMOKING AFFECTING PREGNANCY IN THIRD TRIMESTER: ICD-10-CM

## 2020-07-20 DIAGNOSIS — N83.202 OVARIAN CYST, LEFT: ICD-10-CM

## 2020-07-20 DIAGNOSIS — O20.9 BLEEDING IN EARLY PREGNANCY: ICD-10-CM

## 2020-07-20 DIAGNOSIS — R19.00 PELVIC MASS DURING PREGNANCY: ICD-10-CM

## 2020-07-20 DIAGNOSIS — Z36.85 ANTENATAL SCREENING FOR STREPTOCOCCUS B: Primary | ICD-10-CM

## 2020-07-20 PROCEDURE — 99213 OFFICE O/P EST LOW 20 MIN: CPT | Performed by: OBSTETRICS & GYNECOLOGY

## 2020-07-20 PROCEDURE — 87653 STREP B DNA AMP PROBE: CPT | Performed by: OBSTETRICS & GYNECOLOGY

## 2020-07-20 NOTE — PROGRESS NOTES
"CC: Prenatal visit    Dilia Ray is a 24 y.o.  at 35w3d.  Doing well.  Denies contractions, LOF, or VB.  Reports good FM.    /70   Wt 132 kg (290 lb)   LMP 10/22/2019 (Approximate)   BMI 44.09 kg/m²   SVE: Not done  Fundal Height (cm): 36 cm  Fetal Heart Rate: 173     Problems (from 20 to present)     Problem Noted Resolved    Postoperative state 2020 by Kale Avila MD No    Priority:  High      Pelvic mass during pregnancy 3/23/2020 by Kale Avila MD No    Priority:  High      High-risk pregnancy in third trimester 3/11/2020 by Guille Lopes DO No    Overview Signed 3/11/2020  3:10 PM by Guille Lopes DO     Patient with 2 large ovarian cysts both measuring 9 cm we will continue to follow closely per [facility] recommendations.         Ovarian cyst, left 2020 by Guille Lopes DO No    Overview Signed 2020  3:43 PM by Guille Lopes DO     Patient had ultrasound at 15 weeks which showed 2 left ovarian cysts one measuring 9 x 6 x 8, the other measuring 8 x 6 x 7.         Marijuana abuse in remission 2020 by Zahraa Sanchez APRN No    Overview Signed 2020  1:33 PM by Zahraa Sanchez APRN     Quit smoking on          Drug use affecting pregnancy in first trimester 2020 by Zahraa Sanchez APRN No    Overview Signed 2020  1:33 PM by Zahraa Sanchez APRN     Quit smoking on          Bleeding in early pregnancy 2020 by Zahraa Sanchez APRN No    Overview Signed 2020  1:35 PM by Zahraa Sanchez APRN     + BV at time of NOB  14cm right ovarian cyst  Pt states it is improving as of ; precautions reviewed.          Tobacco smoking affecting pregnancy in third trimester 2020 by Zahraa Sanchez APRN No    Overview Signed 2020  2:46 PM by Zahraa Sanchez APRN     3-5 per day \"trying to quit\"         Complex cyst of right ovary 2020 by Zahraa Sanchez, APRN 2020 by Guille Lopes " C, DO    Overview Signed 2020  1:32 PM by Zahraa Sanchez APRN     14cm; u/s f/u on                A/P: Dilia Ray is a 24 y.o.  at 35w3d.  - RTC in 1 weeks  - Reviewed COVID-19 visitation policy  - Reviewed COVID-19 precautions  Patient complained of leaking small amount of fluid per vagina speculum exam placed Valsalva nitrazine fern negative   Diagnosis Plan   1.  screening for streptococcus B  Group B Strep (Molecular) - Swab, Vaginal/Rectum   2. Postoperative state     3. Pelvic mass during pregnancy     4. High-risk pregnancy in third trimester     5. Ovarian cyst, left     6. Marijuana abuse in remission     7. Drug use affecting pregnancy in first trimester     8. Bleeding in early pregnancy     9. Tobacco smoking affecting pregnancy in third trimester     10. Decreased fetal movements in third trimester, single or unspecified fetus  US Fetal Biophysical Profile;Without Non-Stress Testing biophysical profile was 8/8 reassuring   11. 35 weeks gestation of pregnancy     Kale Avila MD  2020  17:31

## 2020-07-21 ENCOUNTER — NURSE TRIAGE (OUTPATIENT)
Dept: CALL CENTER | Facility: HOSPITAL | Age: 25
End: 2020-07-21

## 2020-07-21 ENCOUNTER — HOSPITAL ENCOUNTER (OUTPATIENT)
Facility: HOSPITAL | Age: 25
Discharge: HOME | End: 2020-07-22
Attending: OBSTETRICS & GYNECOLOGY | Admitting: OBSTETRICS & GYNECOLOGY

## 2020-07-21 LAB — GROUP B STREP, DNA: POSITIVE

## 2020-07-21 PROCEDURE — 59025 FETAL NON-STRESS TEST: CPT

## 2020-07-21 PROCEDURE — G0463 HOSPITAL OUTPT CLINIC VISIT: HCPCS

## 2020-07-22 VITALS
SYSTOLIC BLOOD PRESSURE: 129 MMHG | DIASTOLIC BLOOD PRESSURE: 58 MMHG | BODY MASS INDEX: 43.95 KG/M2 | OXYGEN SATURATION: 99 % | TEMPERATURE: 97.5 F | HEART RATE: 88 BPM | RESPIRATION RATE: 20 BRPM | WEIGHT: 290 LBS | HEIGHT: 68 IN

## 2020-07-22 PROCEDURE — 59025 FETAL NON-STRESS TEST: CPT

## 2020-07-22 PROCEDURE — 59025 FETAL NON-STRESS TEST: CPT | Performed by: OBSTETRICS & GYNECOLOGY

## 2020-07-22 PROCEDURE — G0463 HOSPITAL OUTPT CLINIC VISIT: HCPCS

## 2020-07-22 NOTE — TELEPHONE ENCOUNTER
"Caller states concerned about fetal movement as it is less. She does states seven kicks in one hour. She denies any vaginal bleeding or pain. She does states some drainage/discharge and was tested earlier in week per OB-GYN. She states he told her to monitor kick count and if concerned be seen in ER. She was advised per guideline.     Reason for Disposition  • [1] Pregnant 23 or more weeks AND [2] normal kick count BUT [3] mother still thinks there is something wrong    Additional Information  • Negative: Sounds like a life-threatening emergency to the triager  • Negative: Injury to abdomen  • Negative: [1] Pregnant > 36 weeks AND [2] having contractions or other symptoms of labor  • Negative: [1] Pregnant < 37 weeks AND [2] having contractions or other symptoms of labor  • Negative: [1] Pregnant > 20 weeks AND [2] abdominal pain  • Negative: [1] Pregnant > 20 weeks AND [2] vaginal bleeding or spotting  • Negative: New blurred vision or vision changes  • Negative: [1] SEVERE headache AND [2] not relieved with acetaminophen (e.g., Tylenol)  • Negative: Leakage of fluid from vagina  • Negative: [1] Pregnant 23 or more weeks AND [2] baby moving less today by kick count  (e.g., kick count < 5 in 1 hour or < 10 in 2 hours)  • Negative: [1] Pregnant 23 or more weeks AND [2] baby moving less today AND [3] unable or unwilling to perform kick count    Answer Assessment - Initial Assessment Questions  1. FETAL MOVEMENT: \"Has the baby's movement decreased or changed significantly from normal?\" (e.g., yes, no; describe)    Decreased significantly from normal   2. CAN: \"What date are you expecting to deliver?\"       Aug 17, 2020  3. PREGNANCY: \"How many weeks pregnant are you?\"       36 weeks and one day   4. OTHER SYMPTOMS: \"Do you have any other symptoms?\" (e.g., abdominal pain, leaking fluid from vagina, vaginal bleeding, etc.)      Leaking since Monday she states but then states maybe discharge    Protocols used: PREGNANCY - " DECREASED FETAL MOVEMENT-ADULT-AH

## 2020-07-22 NOTE — NON STRESS TEST
Dilia Olivas, a  at 35w5d with an CAN of 2020, dating u/s 2020 8w6d, was seen at [facility] DELIVERY for a nonstress test.    Chief Complaint   Patient presents with   • Decreased Fetal Movement     not feeling baby move as much for past four days; later c/o having blurring vision and feeling like might pass out earlier today       Patient Active Problem List   Diagnosis   • Insulin resistance   • Tobacco smoking affecting pregnancy in third trimester   • Marijuana abuse in remission   • Drug use affecting pregnancy in first trimester   • Bleeding in early pregnancy   • Ovarian cyst, left   • High-risk pregnancy in third trimester   • Pelvic mass during pregnancy   • Postoperative state   • Encounter for other  screening follow-up   • Insufficient prenatal care in third trimester   • 33 weeks gestation of pregnancy   • Vaginal discharge   • Decreased fetal movements in third trimester   •  screening for streptococcus B   • 35 weeks gestation of pregnancy     Start Time: 2340  Stop Time: 0010    Interpretation A  Nonstress Test Interpretation A: Reactive (20 0005 : Yaneth Parker, RN)  Comments A: reviewed with huy mccrary RN (20 0005 : Yaneth Parker, RN)      Strip reactive

## 2020-07-25 ENCOUNTER — HOSPITAL ENCOUNTER (OUTPATIENT)
Facility: HOSPITAL | Age: 25
Discharge: HOME | End: 2020-07-25
Attending: OBSTETRICS & GYNECOLOGY | Admitting: OBSTETRICS & GYNECOLOGY

## 2020-07-25 ENCOUNTER — APPOINTMENT (OUTPATIENT)
Dept: ULTRASOUND IMAGING | Facility: HOSPITAL | Age: 25
End: 2020-07-25

## 2020-07-25 VITALS
OXYGEN SATURATION: 97 % | HEART RATE: 106 BPM | RESPIRATION RATE: 20 BRPM | SYSTOLIC BLOOD PRESSURE: 130 MMHG | DIASTOLIC BLOOD PRESSURE: 73 MMHG

## 2020-07-25 PROBLEM — O26.899 INSULIN RESISTANCE COMPLICATING PREGNANCY: Status: ACTIVE | Noted: 2020-07-25

## 2020-07-25 PROBLEM — O23.43 URINARY TRACT INFECTION IN MOTHER DURING THIRD TRIMESTER OF PREGNANCY: Status: ACTIVE | Noted: 2020-07-25

## 2020-07-25 PROBLEM — O09.33 INSUFFICIENT PRENATAL CARE IN THIRD TRIMESTER: Status: RESOLVED | Noted: 2020-05-29 | Resolved: 2020-07-25

## 2020-07-25 PROBLEM — Z3A.35 35 WEEKS GESTATION OF PREGNANCY: Status: RESOLVED | Noted: 2020-07-20 | Resolved: 2020-07-25

## 2020-07-25 PROBLEM — Z36.2 ENCOUNTER FOR OTHER ANTENATAL SCREENING FOLLOW-UP: Status: RESOLVED | Noted: 2020-04-22 | Resolved: 2020-07-25

## 2020-07-25 PROBLEM — Z36.85 ANTENATAL SCREENING FOR STREPTOCOCCUS B: Status: RESOLVED | Noted: 2020-07-20 | Resolved: 2020-07-25

## 2020-07-25 PROBLEM — Z3A.33 33 WEEKS GESTATION OF PREGNANCY: Status: RESOLVED | Noted: 2020-07-07 | Resolved: 2020-07-25

## 2020-07-25 PROBLEM — O36.8130 DECREASED FETAL MOVEMENTS IN THIRD TRIMESTER: Status: RESOLVED | Noted: 2020-07-20 | Resolved: 2020-07-25

## 2020-07-25 PROBLEM — E88.81 INSULIN RESISTANCE: Status: RESOLVED | Noted: 2017-01-03 | Resolved: 2020-07-25

## 2020-07-25 PROBLEM — N89.8 VAGINAL DISCHARGE: Status: RESOLVED | Noted: 2020-07-07 | Resolved: 2020-07-25

## 2020-07-25 LAB
BILIRUB UR QL STRIP: NEGATIVE
CANDIDA ALBICANS: POSITIVE
CLARITY UR: ABNORMAL
COLOR UR: YELLOW
GARDNERELLA VAGINALIS: POSITIVE
GLUCOSE UR STRIP-MCNC: NEGATIVE MG/DL
HGB UR QL STRIP.AUTO: NEGATIVE
KETONES UR QL STRIP: NEGATIVE
LEUKOCYTE ESTERASE UR QL STRIP.AUTO: ABNORMAL
NITRITE UR QL STRIP: POSITIVE
PH UR STRIP.AUTO: 6.5 [PH] (ref 5–9)
PROT UR QL STRIP: ABNORMAL
SP GR UR STRIP: 1.02 (ref 1–1.03)
T VAGINALIS DNA VAG QL PROBE+SIG AMP: NEGATIVE
UROBILINOGEN UR QL STRIP: ABNORMAL

## 2020-07-25 PROCEDURE — 59025 FETAL NON-STRESS TEST: CPT

## 2020-07-25 PROCEDURE — G0463 HOSPITAL OUTPT CLINIC VISIT: HCPCS

## 2020-07-25 PROCEDURE — 76819 FETAL BIOPHYS PROFIL W/O NST: CPT

## 2020-07-25 PROCEDURE — 87480 CANDIDA DNA DIR PROBE: CPT | Performed by: OBSTETRICS & GYNECOLOGY

## 2020-07-25 PROCEDURE — 59025 FETAL NON-STRESS TEST: CPT | Performed by: OBSTETRICS & GYNECOLOGY

## 2020-07-25 PROCEDURE — 63710000001 ONDANSETRON PER 8 MG: Performed by: OBSTETRICS & GYNECOLOGY

## 2020-07-25 PROCEDURE — 87660 TRICHOMONAS VAGIN DIR PROBE: CPT | Performed by: OBSTETRICS & GYNECOLOGY

## 2020-07-25 PROCEDURE — 99213 OFFICE O/P EST LOW 20 MIN: CPT | Performed by: OBSTETRICS & GYNECOLOGY

## 2020-07-25 PROCEDURE — 87510 GARDNER VAG DNA DIR PROBE: CPT | Performed by: OBSTETRICS & GYNECOLOGY

## 2020-07-25 PROCEDURE — 81003 URINALYSIS AUTO W/O SCOPE: CPT | Performed by: OBSTETRICS & GYNECOLOGY

## 2020-07-25 RX ORDER — METRONIDAZOLE 500 MG/1
500 TABLET ORAL ONCE
Status: COMPLETED | OUTPATIENT
Start: 2020-07-25 | End: 2020-07-25

## 2020-07-25 RX ORDER — ONDANSETRON 4 MG/1
8 TABLET, FILM COATED ORAL ONCE
Status: COMPLETED | OUTPATIENT
Start: 2020-07-25 | End: 2020-07-25

## 2020-07-25 RX ORDER — NITROFURANTOIN 25; 75 MG/1; MG/1
100 CAPSULE ORAL ONCE
Status: COMPLETED | OUTPATIENT
Start: 2020-07-25 | End: 2020-07-25

## 2020-07-25 RX ORDER — FLUCONAZOLE 150 MG/1
150 TABLET ORAL
Qty: 2 TABLET | Refills: 0 | Status: SHIPPED | OUTPATIENT
Start: 2020-07-25 | End: 2020-08-12

## 2020-07-25 RX ORDER — ONDANSETRON 4 MG/1
4 TABLET, FILM COATED ORAL EVERY 8 HOURS PRN
Qty: 30 TABLET | Refills: 1 | Status: SHIPPED | OUTPATIENT
Start: 2020-07-25 | End: 2020-08-12

## 2020-07-25 RX ORDER — METRONIDAZOLE 500 MG/1
500 TABLET ORAL 2 TIMES DAILY
Qty: 14 TABLET | Refills: 0 | Status: SHIPPED | OUTPATIENT
Start: 2020-07-25 | End: 2020-08-01

## 2020-07-25 RX ORDER — NITROFURANTOIN 25; 75 MG/1; MG/1
100 CAPSULE ORAL 2 TIMES DAILY
Qty: 14 CAPSULE | Refills: 0 | Status: SHIPPED | OUTPATIENT
Start: 2020-07-25 | End: 2020-08-01

## 2020-07-25 RX ADMIN — NITROFURANTOIN MONOHYDRATE/MACROCRYSTALLINE 100 MG: 25; 75 CAPSULE ORAL at 20:25

## 2020-07-25 RX ADMIN — METRONIDAZOLE 500 MG: 500 TABLET ORAL at 20:25

## 2020-07-25 RX ADMIN — ONDANSETRON HYDROCHLORIDE 8 MG: 4 TABLET, FILM COATED ORAL at 20:25

## 2020-07-26 NOTE — NURSING NOTE
Reviewed discharge instructions with patient, allowed opportunity for questions, provided new medication education, all questions answered, pt verbalized understanding.  Patient agreeable with discharge and was discharge home undelivered.

## 2020-07-26 NOTE — DISCHARGE INSTR - ACTIVITY
Return or call provider for strong regular contractions which are 3-5 minutes apart after comfort measures.  Comfort measures include warm bath or shower, resting and drinking large glass of water.  Also return for leakage of fluids, vaginal bleeding or decreased fetal movements.  Drink plenty of water, keep next scheduled appointment and take all medications as prescribed.      medications from your pharmacy in the morning.  Take all medication as ordered.    New medications...Flagyl, Macrobid, Diflucan, and Zofran.

## 2020-07-26 NOTE — NON STRESS TEST
"  Dilia Olivas, a  at 36w1d with an CAN of 2020, dating u/s 2020 8w6d, was seen at [facility]OR DEL[facility] for a nonstress test.    Chief Complaint   Patient presents with   • Decreased Fetal Movement     x 10 hours    • rectal pressure     1700 intermittent    • other     denies bleeding. questionable leaking of fluid.    • Vaginal Discharge     \"milky white discharge\"        Patient Active Problem List   Diagnosis   • Tobacco smoking affecting pregnancy in third trimester   • Drug use affecting pregnancy in first trimester   • High-risk pregnancy in third trimester   • Pelvic mass during pregnancy   • Insulin resistance complicating pregnancy   • Urinary tract infection in mother during third trimester of pregnancy       Start Time:   Stop Time:     Interpretation A  Nonstress Test Interpretation A: Reactive (20 : Andreia Dennis, RN)  Comments A: reviewed with Dr. Reyez (20 : Andreia Dennis, RN)    BPP obtained , UA shows UTI, awaiting .  Pt felt baby move during BPP.      "

## 2020-07-27 ENCOUNTER — ROUTINE PRENATAL (OUTPATIENT)
Dept: OBSTETRICS AND GYNECOLOGY | Facility: CLINIC | Age: 25
End: 2020-07-27

## 2020-07-27 VITALS — WEIGHT: 289 LBS | DIASTOLIC BLOOD PRESSURE: 66 MMHG | BODY MASS INDEX: 43.94 KG/M2 | SYSTOLIC BLOOD PRESSURE: 118 MMHG

## 2020-07-27 DIAGNOSIS — R19.00 PELVIC MASS DURING PREGNANCY: ICD-10-CM

## 2020-07-27 DIAGNOSIS — Z3A.36 36 WEEKS GESTATION OF PREGNANCY: ICD-10-CM

## 2020-07-27 DIAGNOSIS — O26.899 PELVIC MASS DURING PREGNANCY: ICD-10-CM

## 2020-07-27 DIAGNOSIS — O26.899 INSULIN RESISTANCE COMPLICATING PREGNANCY: ICD-10-CM

## 2020-07-27 DIAGNOSIS — O23.43 URINARY TRACT INFECTION IN MOTHER DURING THIRD TRIMESTER OF PREGNANCY: ICD-10-CM

## 2020-07-27 DIAGNOSIS — E66.01 MORBID OBESITY WITH BMI OF 40.0-44.9, ADULT (HCC): Primary | ICD-10-CM

## 2020-07-27 DIAGNOSIS — O99.321 DRUG USE AFFECTING PREGNANCY IN FIRST TRIMESTER: ICD-10-CM

## 2020-07-27 DIAGNOSIS — O09.93 HIGH-RISK PREGNANCY IN THIRD TRIMESTER: ICD-10-CM

## 2020-07-27 DIAGNOSIS — O99.333 TOBACCO SMOKING AFFECTING PREGNANCY IN THIRD TRIMESTER: ICD-10-CM

## 2020-07-27 PROCEDURE — 99213 OFFICE O/P EST LOW 20 MIN: CPT | Performed by: OBSTETRICS & GYNECOLOGY

## 2020-07-27 NOTE — PROGRESS NOTES
"CC: Prenatal visit    Dilia Olivas is a 24 y.o.  at 36w3d.  Doing well.  Denies contractions, LOF, or VB.  Reports good FM.    /66   Wt 131 kg (289 lb)   LMP 10/22/2019 (Approximate)   BMI 43.94 kg/m²   SVE: Not done  Fundal Height (cm): 37 cm  Fetal Heart Rate: 150     Problems (from 20 to present)     Problem Noted Resolved    Pelvic mass during pregnancy 3/23/2020 by Kale Avila MD No    Priority:  High      Overview Signed 2020  6:52 PM by Kina Reyez MD     S/p laparoscopic LSO on  -> serous cystadenoma         Insulin resistance complicating pregnancy 2020 by Kian Reyez MD No    Overview Signed 2020  6:53 PM by Kina Reyez MD     PCOS         Urinary tract infection in mother during third trimester of pregnancy 2020 by Kina Reyez MD No    Overview Signed 2020  7:51 PM by Kina Reyez MD     Diagnosed ; Macrobid course         High-risk pregnancy in third trimester 3/11/2020 by Guille Lopes DO No    Overview Signed 3/11/2020  3:10 PM by Guille Lopes DO     Patient with 2 large ovarian cysts both measuring 9 cm we will continue to follow closely per [facility] recommendations.         Drug use affecting pregnancy in first trimester 2020 by Zahraa Sanchez APRN No    Overview Addendum 2020  6:53 PM by Kina Reyez MD     Quit MJ use on          Tobacco smoking affecting pregnancy in third trimester 2020 by Zahraa Sanchez APRN No    Overview Signed 2020  2:46 PM by Zahraa Sanchez APRN     3-5 per day \"trying to quit\"         Complex cyst of right ovary 2020 by Zahraa Sanchez APRN 2020 by Guille Lopes DO    Overview Signed 2020  1:32 PM by Zahraa Sanchez APRN     14cm; u/s f/u on                A/P: Dilialenore Cotton Brendon is a 24 y.o.  at 36w3d.  - RTC in 1 weeks  - " Reviewed COVID-19 visitation policy  - Reviewed COVID-19 precautions     Diagnosis Plan   1. Morbid obesity with BMI of 40.0-44.9, adult (CMS/HCC)  US Fetal Biophysical Profile;Without Non-Stress Testing   2. Insulin resistance complicating pregnancy  US Fetal Biophysical Profile;Without Non-Stress Testing   3. Urinary tract infection in mother during third trimester of pregnancy     4. Pelvic mass during pregnancy     5. High-risk pregnancy in third trimester     6. Drug use affecting pregnancy in first trimester     7. Tobacco smoking affecting pregnancy in third trimester     8. 36 weeks gestation of pregnancy     Group B strep positive this reviewed with her questions answered  Kale Avila MD  7/27/2020  18:11

## 2020-08-03 ENCOUNTER — ROUTINE PRENATAL (OUTPATIENT)
Dept: OBSTETRICS AND GYNECOLOGY | Facility: CLINIC | Age: 25
End: 2020-08-03

## 2020-08-03 VITALS — BODY MASS INDEX: 44.03 KG/M2 | DIASTOLIC BLOOD PRESSURE: 64 MMHG | WEIGHT: 289.6 LBS | SYSTOLIC BLOOD PRESSURE: 118 MMHG

## 2020-08-03 DIAGNOSIS — O23.43 URINARY TRACT INFECTION IN MOTHER DURING THIRD TRIMESTER OF PREGNANCY: ICD-10-CM

## 2020-08-03 DIAGNOSIS — O99.333 TOBACCO SMOKING AFFECTING PREGNANCY IN THIRD TRIMESTER: ICD-10-CM

## 2020-08-03 DIAGNOSIS — Z3A.37 37 WEEKS GESTATION OF PREGNANCY: Primary | ICD-10-CM

## 2020-08-03 DIAGNOSIS — O09.93 HIGH-RISK PREGNANCY IN THIRD TRIMESTER: ICD-10-CM

## 2020-08-03 DIAGNOSIS — O36.63X0 EXCESSIVE FETAL GROWTH AFFECTING MANAGEMENT OF PREGNANCY IN THIRD TRIMESTER, SINGLE OR UNSPECIFIED FETUS: ICD-10-CM

## 2020-08-03 DIAGNOSIS — O26.899 INSULIN RESISTANCE COMPLICATING PREGNANCY: ICD-10-CM

## 2020-08-03 DIAGNOSIS — O99.321 DRUG USE AFFECTING PREGNANCY IN FIRST TRIMESTER: ICD-10-CM

## 2020-08-03 PROCEDURE — 99213 OFFICE O/P EST LOW 20 MIN: CPT | Performed by: OBSTETRICS & GYNECOLOGY

## 2020-08-04 PROBLEM — Z3A.37 37 WEEKS GESTATION OF PREGNANCY: Status: ACTIVE | Noted: 2020-08-04

## 2020-08-04 PROBLEM — O36.63X0 EXCESSIVE FETAL GROWTH AFFECTING MANAGEMENT OF MOTHER IN THIRD TRIMESTER, ANTEPARTUM: Status: ACTIVE | Noted: 2020-08-04

## 2020-08-05 ENCOUNTER — HOSPITAL ENCOUNTER (OUTPATIENT)
Facility: HOSPITAL | Age: 25
Discharge: HOME | End: 2020-08-05
Attending: OBSTETRICS & GYNECOLOGY | Admitting: OBSTETRICS & GYNECOLOGY

## 2020-08-05 ENCOUNTER — APPOINTMENT (OUTPATIENT)
Dept: ULTRASOUND IMAGING | Facility: HOSPITAL | Age: 25
End: 2020-08-05

## 2020-08-05 VITALS
OXYGEN SATURATION: 98 % | TEMPERATURE: 98.1 F | WEIGHT: 290 LBS | HEART RATE: 86 BPM | HEIGHT: 68 IN | DIASTOLIC BLOOD PRESSURE: 67 MMHG | RESPIRATION RATE: 20 BRPM | SYSTOLIC BLOOD PRESSURE: 128 MMHG | BODY MASS INDEX: 43.95 KG/M2

## 2020-08-05 DIAGNOSIS — E66.01 MORBID OBESITY WITH BMI OF 40.0-44.9, ADULT (HCC): ICD-10-CM

## 2020-08-05 DIAGNOSIS — O26.899 INSULIN RESISTANCE COMPLICATING PREGNANCY: ICD-10-CM

## 2020-08-05 LAB
ALBUMIN SERPL-MCNC: 3.5 G/DL (ref 3.5–5.2)
ALBUMIN/GLOB SERPL: 1.1 G/DL
ALP SERPL-CCNC: 143 U/L (ref 39–117)
ALT SERPL W P-5'-P-CCNC: 13 U/L (ref 1–33)
ANION GAP SERPL CALCULATED.3IONS-SCNC: 11 MMOL/L (ref 5–15)
AST SERPL-CCNC: 12 U/L (ref 1–32)
BILIRUB SERPL-MCNC: <0.2 MG/DL (ref 0–1.2)
BUN SERPL-MCNC: 8 MG/DL (ref 6–20)
BUN/CREAT SERPL: 18.6 (ref 7–25)
CALCIUM SPEC-SCNC: 8.9 MG/DL (ref 8.6–10.5)
CHLORIDE SERPL-SCNC: 107 MMOL/L (ref 98–107)
CO2 SERPL-SCNC: 17 MMOL/L (ref 22–29)
CREAT SERPL-MCNC: 0.43 MG/DL (ref 0.57–1)
DEPRECATED RDW RBC AUTO: 45.2 FL (ref 37–54)
ERYTHROCYTE [DISTWIDTH] IN BLOOD BY AUTOMATED COUNT: 14.7 % (ref 12.3–15.4)
GFR SERPL CREATININE-BSD FRML MDRD: >150 ML/MIN/1.73
GLOBULIN UR ELPH-MCNC: 3.1 GM/DL
GLUCOSE SERPL-MCNC: 124 MG/DL (ref 65–99)
HCT VFR BLD AUTO: 33.6 % (ref 34–46.6)
HGB BLD-MCNC: 11.4 G/DL (ref 12–15.9)
MCH RBC QN AUTO: 28.7 PG (ref 26.6–33)
MCHC RBC AUTO-ENTMCNC: 33.9 G/DL (ref 31.5–35.7)
MCV RBC AUTO: 84.6 FL (ref 79–97)
PLATELET # BLD AUTO: 289 10*3/MM3 (ref 140–450)
PMV BLD AUTO: 9.4 FL (ref 6–12)
POTASSIUM SERPL-SCNC: 3.8 MMOL/L (ref 3.5–5.2)
PROT SERPL-MCNC: 6.6 G/DL (ref 6–8.5)
RBC # BLD AUTO: 3.97 10*6/MM3 (ref 3.77–5.28)
SODIUM SERPL-SCNC: 135 MMOL/L (ref 136–145)
WBC # BLD AUTO: 11.68 10*3/MM3 (ref 3.4–10.8)

## 2020-08-05 PROCEDURE — 36415 COLL VENOUS BLD VENIPUNCTURE: CPT | Performed by: OBSTETRICS & GYNECOLOGY

## 2020-08-05 PROCEDURE — 85027 COMPLETE CBC AUTOMATED: CPT | Performed by: OBSTETRICS & GYNECOLOGY

## 2020-08-05 PROCEDURE — 82570 ASSAY OF URINE CREATININE: CPT | Performed by: OBSTETRICS & GYNECOLOGY

## 2020-08-05 PROCEDURE — 84156 ASSAY OF PROTEIN URINE: CPT | Performed by: OBSTETRICS & GYNECOLOGY

## 2020-08-05 PROCEDURE — 59025 FETAL NON-STRESS TEST: CPT

## 2020-08-05 PROCEDURE — G0463 HOSPITAL OUTPT CLINIC VISIT: HCPCS

## 2020-08-05 PROCEDURE — 59025 FETAL NON-STRESS TEST: CPT | Performed by: OBSTETRICS & GYNECOLOGY

## 2020-08-05 PROCEDURE — 76819 FETAL BIOPHYS PROFIL W/O NST: CPT

## 2020-08-05 PROCEDURE — 80053 COMPREHEN METABOLIC PANEL: CPT | Performed by: OBSTETRICS & GYNECOLOGY

## 2020-08-05 NOTE — PROGRESS NOTES
"CC: Prenatal visit    Dilia Olivas is a 24 y.o.  at 37w4d.  Doing well.  Denies contractions, LOF, or VB.  Reports good FM.    /64   Wt 131 kg (289 lb 9.6 oz)   LMP 10/22/2019 (Approximate)   BMI 44.03 kg/m²   SVE: Not done  Fundal Height (cm): 38 cm  Fetal Heart Rate: 140     Problems (from 20 to present)     Problem Noted Resolved    Mother positive for group B Streptococcus colonization 2020 by Kale Avila MD No    Priority:  High      Pelvic mass during pregnancy 3/23/2020 by Kale Avila MD No    Priority:  High      Overview Signed 2020  6:52 PM by Kina Reyez MD     S/p laparoscopic LSO on  -> serous cystadenoma         Insulin resistance complicating pregnancy 2020 by Kina Reyez MD No    Overview Signed 2020  6:53 PM by Kina Reyez MD     PCOS         Urinary tract infection in mother during third trimester of pregnancy 2020 by Kina Reyez MD No    Overview Signed 2020  7:51 PM by Kina Reyez MD     Diagnosed ; Macrobid course         High-risk pregnancy in third trimester 3/11/2020 by Guille Lopes DO No    Overview Signed 3/11/2020  3:10 PM by Guille Lopes DO     Patient with 2 large ovarian cysts both measuring 9 cm we will continue to follow closely per [facility] recommendations.         Drug use affecting pregnancy in first trimester 2020 by Zahraa Sanchez APRN No    Overview Addendum 2020  6:53 PM by Kina Reyez MD     Quit MJ use on          Tobacco smoking affecting pregnancy in third trimester 2020 by Zahraa Sanchez APRN No    Overview Signed 2020  2:46 PM by Zahraa Sanchez APRN     3-5 per day \"trying to quit\"         Complex cyst of right ovary 2020 by Zahraa Sanchez APRN 2020 by Guille Lopes, DO    Overview Signed 2020  1:32 PM by Zahraa Sanchez, " APRN     14cm; u/s f/u on                A/P: Dilia Olivas is a 24 y.o.  at 37w4d.  - RTC in 1 weeks  - Reviewed COVID-19 visitation policy  - Reviewed COVID-19 precautions     Diagnosis Plan   1. 37 weeks gestation of pregnancy     2. Mother positive for group B Streptococcus colonization     3. Insulin resistance complicating pregnancy     4. Urinary tract infection in mother during third trimester of pregnancy     5. Pelvic mass during pregnancy     6. High-risk pregnancy in third trimester     7. Drug use affecting pregnancy in first trimester     8. Tobacco smoking affecting pregnancy in third trimester     Patient strongly asking for induction of labor because of large for gestational age infant.  Discussed risk of induction of labor and indications for early term delivery  Kale Avila MD  2020  19:29

## 2020-08-06 LAB
CREAT UR-MCNC: 92.4 MG/DL
PROT UR-MCNC: 10 MG/DL
PROT/CREAT UR: 108.2 MG/G CREA (ref 0–200)

## 2020-08-06 NOTE — DISCHARGE INSTRUCTIONS
Return to hospital for decreased fetal movement, water breaks, bright red bleeding, or contractions you are unable to walk or talk through.     Keep next scheduled appointment.     Call office or on call OB with any questions or concerns.

## 2020-08-06 NOTE — NON STRESS TEST
Dilia Olivas, a  at 37w5d with an CAN of 2020, dating u/s 2020 8w6d, was seen at [facility] for a nonstress test.    Chief Complaint   Patient presents with   • Fall     pt states she fell at home around an hour ago. States she passed out and fell, says she landed on her knees and went to her back and that she did not land on her abdomen. States positive fetal movement and denies vaginal bleeding or leaking of fluid. Denies headaches or epigastric pain but states occasionally sees spots in her left eye.        Patient Active Problem List   Diagnosis   • Tobacco smoking affecting pregnancy in third trimester   • Drug use affecting pregnancy in first trimester   • High-risk pregnancy in third trimester   • Pelvic mass during pregnancy   • Insulin resistance complicating pregnancy   • Urinary tract infection in mother during third trimester of pregnancy   • 36 weeks gestation of pregnancy   • Morbid obesity with BMI of 40.0-44.9, adult (CMS/[facility], [facility])   • Mother positive for group B Streptococcus colonization   • 37 weeks gestation of pregnancy   • Excessive fetal growth affecting management of mother in third trimester, antepartum       Start Time:   Stop Time:     Interpretation A  Nonstress Test Interpretation A: Reactive (20 : Nena Davis RN)  Comments A: Reviewed with JESSIE Valverde RN (20 : Nena Davis, RN)    BPP . NST reactive. Denies any pain.

## 2020-08-12 ENCOUNTER — ROUTINE PRENATAL (OUTPATIENT)
Dept: OBSTETRICS AND GYNECOLOGY | Facility: CLINIC | Age: 25
End: 2020-08-12

## 2020-08-12 VITALS — SYSTOLIC BLOOD PRESSURE: 132 MMHG | BODY MASS INDEX: 44.76 KG/M2 | WEIGHT: 293 LBS | DIASTOLIC BLOOD PRESSURE: 74 MMHG

## 2020-08-12 DIAGNOSIS — O99.333 TOBACCO SMOKING AFFECTING PREGNANCY IN THIRD TRIMESTER: ICD-10-CM

## 2020-08-12 DIAGNOSIS — Z3A.38 38 WEEKS GESTATION OF PREGNANCY: Primary | ICD-10-CM

## 2020-08-12 DIAGNOSIS — O26.899 INSULIN RESISTANCE COMPLICATING PREGNANCY: ICD-10-CM

## 2020-08-12 DIAGNOSIS — O36.63X0 EXCESSIVE FETAL GROWTH AFFECTING MANAGEMENT OF PREGNANCY IN THIRD TRIMESTER, SINGLE OR UNSPECIFIED FETUS: ICD-10-CM

## 2020-08-12 DIAGNOSIS — O40.3XX0 POLYHYDRAMNIOS IN THIRD TRIMESTER COMPLICATION, SINGLE OR UNSPECIFIED FETUS: ICD-10-CM

## 2020-08-12 DIAGNOSIS — O23.43 URINARY TRACT INFECTION IN MOTHER DURING THIRD TRIMESTER OF PREGNANCY: ICD-10-CM

## 2020-08-12 DIAGNOSIS — O99.321 DRUG USE AFFECTING PREGNANCY IN FIRST TRIMESTER: ICD-10-CM

## 2020-08-12 DIAGNOSIS — O09.93 HIGH-RISK PREGNANCY IN THIRD TRIMESTER: ICD-10-CM

## 2020-08-12 PROCEDURE — 99213 OFFICE O/P EST LOW 20 MIN: CPT | Performed by: OBSTETRICS & GYNECOLOGY

## 2020-08-12 RX ORDER — CARBOPROST TROMETHAMINE 250 UG/ML
250 INJECTION, SOLUTION INTRAMUSCULAR AS NEEDED
Status: CANCELLED | OUTPATIENT
Start: 2020-08-12

## 2020-08-12 RX ORDER — LIDOCAINE HYDROCHLORIDE 10 MG/ML
5 INJECTION, SOLUTION EPIDURAL; INFILTRATION; INTRACAUDAL; PERINEURAL AS NEEDED
Status: CANCELLED | OUTPATIENT
Start: 2020-08-12

## 2020-08-12 RX ORDER — OXYTOCIN 10 [USP'U]/ML
650 INJECTION, SOLUTION INTRAMUSCULAR; INTRAVENOUS ONCE
Status: CANCELLED | OUTPATIENT
Start: 2020-08-12

## 2020-08-12 RX ORDER — OXYTOCIN 10 [USP'U]/ML
85 INJECTION, SOLUTION INTRAMUSCULAR; INTRAVENOUS ONCE
Status: CANCELLED | OUTPATIENT
Start: 2020-08-12

## 2020-08-12 RX ORDER — OXYTOCIN 10 [USP'U]/ML
2-20 INJECTION, SOLUTION INTRAMUSCULAR; INTRAVENOUS
Status: CANCELLED | OUTPATIENT
Start: 2020-08-12

## 2020-08-12 RX ORDER — BUTORPHANOL TARTRATE 1 MG/ML
1 INJECTION, SOLUTION INTRAMUSCULAR; INTRAVENOUS
Status: CANCELLED | OUTPATIENT
Start: 2020-08-12

## 2020-08-12 RX ORDER — PROMETHAZINE HYDROCHLORIDE 25 MG/1
12.5 SUPPOSITORY RECTAL EVERY 6 HOURS PRN
Status: CANCELLED | OUTPATIENT
Start: 2020-08-12

## 2020-08-12 RX ORDER — DEXTROSE, SODIUM CHLORIDE, SODIUM LACTATE, POTASSIUM CHLORIDE, AND CALCIUM CHLORIDE 5; .6; .31; .03; .02 G/100ML; G/100ML; G/100ML; G/100ML; G/100ML
125 INJECTION, SOLUTION INTRAVENOUS CONTINUOUS
Status: CANCELLED | OUTPATIENT
Start: 2020-08-12

## 2020-08-12 RX ORDER — BUTORPHANOL TARTRATE 1 MG/ML
2 INJECTION, SOLUTION INTRAMUSCULAR; INTRAVENOUS
Status: CANCELLED | OUTPATIENT
Start: 2020-08-12

## 2020-08-12 RX ORDER — PROMETHAZINE HYDROCHLORIDE 25 MG/ML
6.25 INJECTION, SOLUTION INTRAMUSCULAR; INTRAVENOUS
Status: CANCELLED | OUTPATIENT
Start: 2020-08-12

## 2020-08-12 RX ORDER — SODIUM CHLORIDE 0.9 % (FLUSH) 0.9 %
3 SYRINGE (ML) INJECTION EVERY 12 HOURS SCHEDULED
Status: CANCELLED | OUTPATIENT
Start: 2020-08-12

## 2020-08-12 RX ORDER — SODIUM CHLORIDE 0.9 % (FLUSH) 0.9 %
3-10 SYRINGE (ML) INJECTION AS NEEDED
Status: CANCELLED | OUTPATIENT
Start: 2020-08-12

## 2020-08-12 RX ORDER — METHYLERGONOVINE MALEATE 0.2 MG/ML
200 INJECTION INTRAVENOUS ONCE AS NEEDED
Status: CANCELLED | OUTPATIENT
Start: 2020-08-12

## 2020-08-12 RX ORDER — PROMETHAZINE HYDROCHLORIDE 25 MG/1
12.5 TABLET ORAL EVERY 6 HOURS PRN
Status: CANCELLED | OUTPATIENT
Start: 2020-08-12

## 2020-08-12 RX ORDER — MISOPROSTOL 100 UG/1
800 TABLET ORAL AS NEEDED
Status: CANCELLED | OUTPATIENT
Start: 2020-08-12

## 2020-08-13 ENCOUNTER — ANESTHESIA (OUTPATIENT)
Dept: LABOR AND DELIVERY | Facility: HOSPITAL | Age: 25
End: 2020-08-13

## 2020-08-13 ENCOUNTER — HOSPITAL ENCOUNTER (INPATIENT)
Facility: HOSPITAL | Age: 25
LOS: 3 days | Discharge: HOME | End: 2020-08-16
Attending: OBSTETRICS & GYNECOLOGY | Admitting: OBSTETRICS & GYNECOLOGY

## 2020-08-13 ENCOUNTER — ANESTHESIA EVENT (OUTPATIENT)
Dept: LABOR AND DELIVERY | Facility: HOSPITAL | Age: 25
End: 2020-08-13

## 2020-08-13 ENCOUNTER — HOSPITAL ENCOUNTER (OUTPATIENT)
Dept: LABOR AND DELIVERY | Facility: HOSPITAL | Age: 25
Discharge: HOME | End: 2020-08-13

## 2020-08-13 DIAGNOSIS — O40.3XX0 POLYHYDRAMNIOS IN THIRD TRIMESTER COMPLICATION, SINGLE OR UNSPECIFIED FETUS: ICD-10-CM

## 2020-08-13 PROBLEM — O40.9XX0 POLYHYDRAMNIOS: Status: ACTIVE | Noted: 2020-08-13

## 2020-08-13 LAB
ABO GROUP BLD: NORMAL
AMPHET+METHAMPHET UR QL: NEGATIVE
AMPHETAMINES UR QL: NEGATIVE
BARBITURATES UR QL SCN: NEGATIVE
BENZODIAZ UR QL SCN: NEGATIVE
BLD GP AB SCN SERPL QL: NEGATIVE
BUPRENORPHINE SERPL-MCNC: NEGATIVE NG/ML
CANNABINOIDS SERPL QL: NEGATIVE
COCAINE UR QL: NEGATIVE
DEPRECATED RDW RBC AUTO: 45.5 FL (ref 37–54)
ERYTHROCYTE [DISTWIDTH] IN BLOOD BY AUTOMATED COUNT: 14.8 % (ref 12.3–15.4)
HCT VFR BLD AUTO: 33.9 % (ref 34–46.6)
HGB BLD-MCNC: 11.4 G/DL (ref 12–15.9)
HOLD SPECIMEN: NORMAL
Lab: NORMAL
MCH RBC QN AUTO: 28.1 PG (ref 26.6–33)
MCHC RBC AUTO-ENTMCNC: 33.6 G/DL (ref 31.5–35.7)
MCV RBC AUTO: 83.5 FL (ref 79–97)
METHADONE UR QL SCN: NEGATIVE
OPIATES UR QL: NEGATIVE
OXYCODONE UR QL SCN: NEGATIVE
PCP UR QL SCN: NEGATIVE
PLATELET # BLD AUTO: 291 10*3/MM3 (ref 140–450)
PMV BLD AUTO: 9.3 FL (ref 6–12)
PROPOXYPH UR QL: NEGATIVE
RBC # BLD AUTO: 4.06 10*6/MM3 (ref 3.77–5.28)
RH BLD: POSITIVE
T&S EXPIRATION DATE: NORMAL
TRICYCLICS UR QL SCN: NEGATIVE
WBC # BLD AUTO: 13.07 10*3/MM3 (ref 3.4–10.8)

## 2020-08-13 PROCEDURE — 25010000002 PROMETHAZINE PER 50 MG: Performed by: STUDENT IN AN ORGANIZED HEALTH CARE EDUCATION/TRAINING PROGRAM

## 2020-08-13 PROCEDURE — 25010000002 FENTANYL CITRATE (PF) 100 MCG/2ML SOLUTION: Performed by: NURSE ANESTHETIST, CERTIFIED REGISTERED

## 2020-08-13 PROCEDURE — 25010000002 BUTORPHANOL PER 1 MG: Performed by: OBSTETRICS & GYNECOLOGY

## 2020-08-13 PROCEDURE — 86850 RBC ANTIBODY SCREEN: CPT | Performed by: OBSTETRICS & GYNECOLOGY

## 2020-08-13 PROCEDURE — 25010000002 PHENYLEPHRINE PER 1 ML: Performed by: NURSE ANESTHETIST, CERTIFIED REGISTERED

## 2020-08-13 PROCEDURE — 86900 BLOOD TYPING SEROLOGIC ABO: CPT | Performed by: OBSTETRICS & GYNECOLOGY

## 2020-08-13 PROCEDURE — 25010000003 PENICILLIN G POTASSIUM PER 600000 UNITS: Performed by: OBSTETRICS & GYNECOLOGY

## 2020-08-13 PROCEDURE — 86901 BLOOD TYPING SEROLOGIC RH(D): CPT | Performed by: OBSTETRICS & GYNECOLOGY

## 2020-08-13 PROCEDURE — 85027 COMPLETE CBC AUTOMATED: CPT | Performed by: OBSTETRICS & GYNECOLOGY

## 2020-08-13 PROCEDURE — 59200 INSERT CERVICAL DILATOR: CPT | Performed by: OBSTETRICS & GYNECOLOGY

## 2020-08-13 PROCEDURE — C1755 CATHETER, INTRASPINAL: HCPCS | Performed by: NURSE ANESTHETIST, CERTIFIED REGISTERED

## 2020-08-13 PROCEDURE — 80306 DRUG TEST PRSMV INSTRMNT: CPT | Performed by: OBSTETRICS & GYNECOLOGY

## 2020-08-13 RX ORDER — LIDOCAINE HYDROCHLORIDE 10 MG/ML
5 INJECTION, SOLUTION EPIDURAL; INFILTRATION; INTRACAUDAL; PERINEURAL AS NEEDED
Status: DISCONTINUED | OUTPATIENT
Start: 2020-08-13 | End: 2020-08-14 | Stop reason: HOSPADM

## 2020-08-13 RX ORDER — FAMOTIDINE 10 MG/ML
20 INJECTION, SOLUTION INTRAVENOUS ONCE AS NEEDED
Status: DISCONTINUED | OUTPATIENT
Start: 2020-08-13 | End: 2020-08-14 | Stop reason: HOSPADM

## 2020-08-13 RX ORDER — PROMETHAZINE HYDROCHLORIDE 25 MG/ML
6.25 INJECTION, SOLUTION INTRAMUSCULAR; INTRAVENOUS
Status: DISCONTINUED | OUTPATIENT
Start: 2020-08-13 | End: 2020-08-13

## 2020-08-13 RX ORDER — PROMETHAZINE HYDROCHLORIDE 12.5 MG/1
12.5 SUPPOSITORY RECTAL EVERY 6 HOURS PRN
Status: DISCONTINUED | OUTPATIENT
Start: 2020-08-13 | End: 2020-08-14 | Stop reason: HOSPADM

## 2020-08-13 RX ORDER — PROMETHAZINE HYDROCHLORIDE 12.5 MG/1
12.5 SUPPOSITORY RECTAL EVERY 6 HOURS PRN
Status: DISCONTINUED | OUTPATIENT
Start: 2020-08-13 | End: 2020-08-13

## 2020-08-13 RX ORDER — PROMETHAZINE HYDROCHLORIDE 25 MG/ML
12.5 INJECTION, SOLUTION INTRAMUSCULAR; INTRAVENOUS
Status: DISCONTINUED | OUTPATIENT
Start: 2020-08-13 | End: 2020-08-14 | Stop reason: HOSPADM

## 2020-08-13 RX ORDER — ONDANSETRON 2 MG/ML
4 INJECTION INTRAMUSCULAR; INTRAVENOUS ONCE AS NEEDED
Status: DISCONTINUED | OUTPATIENT
Start: 2020-08-13 | End: 2020-08-14 | Stop reason: HOSPADM

## 2020-08-13 RX ORDER — EPHEDRINE SULFATE 50 MG/ML
INJECTION, SOLUTION INTRAVENOUS AS NEEDED
Status: DISCONTINUED | OUTPATIENT
Start: 2020-08-13 | End: 2020-08-14 | Stop reason: SURG

## 2020-08-13 RX ORDER — MISOPROSTOL 100 MCG
25 TABLET ORAL ONCE
Status: COMPLETED | OUTPATIENT
Start: 2020-08-13 | End: 2020-08-13

## 2020-08-13 RX ORDER — BUPIVACAINE HYDROCHLORIDE 2.5 MG/ML
INJECTION, SOLUTION EPIDURAL; INFILTRATION; INTRACAUDAL AS NEEDED
Status: DISCONTINUED | OUTPATIENT
Start: 2020-08-13 | End: 2020-08-13

## 2020-08-13 RX ORDER — BUPIVACAINE HYDROCHLORIDE 2.5 MG/ML
INJECTION, SOLUTION EPIDURAL; INFILTRATION; INTRACAUDAL AS NEEDED
Status: DISCONTINUED | OUTPATIENT
Start: 2020-08-13 | End: 2020-08-14 | Stop reason: SURG

## 2020-08-13 RX ORDER — OXYTOCIN/0.9 % SODIUM CHLORIDE 30/500 ML
2-20 PLASTIC BAG, INJECTION (ML) INTRAVENOUS
Status: DISCONTINUED | OUTPATIENT
Start: 2020-08-13 | End: 2020-08-13

## 2020-08-13 RX ORDER — SODIUM CHLORIDE 0.9 % (FLUSH) 0.9 %
3 SYRINGE (ML) INJECTION EVERY 12 HOURS SCHEDULED
Status: DISCONTINUED | OUTPATIENT
Start: 2020-08-13 | End: 2020-08-14 | Stop reason: HOSPADM

## 2020-08-13 RX ORDER — PROMETHAZINE HYDROCHLORIDE 12.5 MG/1
12.5 TABLET ORAL EVERY 6 HOURS PRN
Status: DISCONTINUED | OUTPATIENT
Start: 2020-08-13 | End: 2020-08-14 | Stop reason: HOSPADM

## 2020-08-13 RX ORDER — DEXTROSE, SODIUM CHLORIDE, SODIUM LACTATE, POTASSIUM CHLORIDE, AND CALCIUM CHLORIDE 5; .6; .31; .03; .02 G/100ML; G/100ML; G/100ML; G/100ML; G/100ML
125 INJECTION, SOLUTION INTRAVENOUS CONTINUOUS
Status: DISCONTINUED | OUTPATIENT
Start: 2020-08-13 | End: 2020-08-14

## 2020-08-13 RX ORDER — BUTORPHANOL TARTRATE 1 MG/ML
1 INJECTION, SOLUTION INTRAMUSCULAR; INTRAVENOUS
Status: DISCONTINUED | OUTPATIENT
Start: 2020-08-13 | End: 2020-08-14 | Stop reason: HOSPADM

## 2020-08-13 RX ORDER — SODIUM CHLORIDE 0.9 % (FLUSH) 0.9 %
3-10 SYRINGE (ML) INJECTION AS NEEDED
Status: DISCONTINUED | OUTPATIENT
Start: 2020-08-13 | End: 2020-08-14 | Stop reason: HOSPADM

## 2020-08-13 RX ORDER — LIDOCAINE HYDROCHLORIDE AND EPINEPHRINE 15; 5 MG/ML; UG/ML
INJECTION, SOLUTION EPIDURAL AS NEEDED
Status: DISCONTINUED | OUTPATIENT
Start: 2020-08-13 | End: 2020-08-14 | Stop reason: SURG

## 2020-08-13 RX ORDER — PROMETHAZINE HYDROCHLORIDE 25 MG/ML
6.25 INJECTION, SOLUTION INTRAMUSCULAR; INTRAVENOUS
Status: DISCONTINUED | OUTPATIENT
Start: 2020-08-13 | End: 2020-08-14 | Stop reason: HOSPADM

## 2020-08-13 RX ORDER — DIPHENHYDRAMINE HYDROCHLORIDE 50 MG/ML
12.5 INJECTION INTRAMUSCULAR; INTRAVENOUS EVERY 8 HOURS PRN
Status: DISCONTINUED | OUTPATIENT
Start: 2020-08-13 | End: 2020-08-14 | Stop reason: HOSPADM

## 2020-08-13 RX ORDER — FENTANYL CITRATE 50 UG/ML
INJECTION, SOLUTION INTRAMUSCULAR; INTRAVENOUS AS NEEDED
Status: DISCONTINUED | OUTPATIENT
Start: 2020-08-13 | End: 2020-08-14 | Stop reason: SURG

## 2020-08-13 RX ORDER — EPHEDRINE SULFATE 50 MG/ML
5 INJECTION, SOLUTION INTRAVENOUS
Status: DISCONTINUED | OUTPATIENT
Start: 2020-08-13 | End: 2020-08-14 | Stop reason: HOSPADM

## 2020-08-13 RX ORDER — MISOPROSTOL 100 MCG
50 TABLET ORAL ONCE
Status: COMPLETED | OUTPATIENT
Start: 2020-08-13 | End: 2020-08-13

## 2020-08-13 RX ORDER — PROMETHAZINE HYDROCHLORIDE 12.5 MG/1
12.5 TABLET ORAL EVERY 6 HOURS PRN
Status: DISCONTINUED | OUTPATIENT
Start: 2020-08-13 | End: 2020-08-13

## 2020-08-13 RX ORDER — OXYTOCIN/0.9 % SODIUM CHLORIDE 30/500 ML
2-20 PLASTIC BAG, INJECTION (ML) INTRAVENOUS
Status: DISCONTINUED | OUTPATIENT
Start: 2020-08-13 | End: 2020-08-14 | Stop reason: HOSPADM

## 2020-08-13 RX ADMIN — Medication 10 ML/HR: at 22:26

## 2020-08-13 RX ADMIN — SODIUM CHLORIDE 3 MILLION UNITS: 9 INJECTION, SOLUTION INTRAVENOUS at 17:46

## 2020-08-13 RX ADMIN — EPHEDRINE SULFATE 10 MG: 50 INJECTION INTRAVENOUS at 22:32

## 2020-08-13 RX ADMIN — MISOPROSTOL 50 MCG: 100 TABLET ORAL at 12:43

## 2020-08-13 RX ADMIN — LIDOCAINE HYDROCHLORIDE AND EPINEPHRINE 3 ML: 15; 5 INJECTION, SOLUTION EPIDURAL at 22:20

## 2020-08-13 RX ADMIN — SODIUM CHLORIDE 5 MILLION UNITS: 9 INJECTION, SOLUTION INTRAVENOUS at 05:32

## 2020-08-13 RX ADMIN — SODIUM CHLORIDE 3 MILLION UNITS: 9 INJECTION, SOLUTION INTRAVENOUS at 13:30

## 2020-08-13 RX ADMIN — SODIUM CHLORIDE 3 MILLION UNITS: 9 INJECTION, SOLUTION INTRAVENOUS at 23:33

## 2020-08-13 RX ADMIN — BUTORPHANOL TARTRATE 2 MG: 2 INJECTION, SOLUTION INTRAMUSCULAR; INTRAVENOUS at 19:34

## 2020-08-13 RX ADMIN — OXYTOCIN-SODIUM CHLORIDE 0.9% IV SOLN 30 UNIT/500ML 2 MILLI-UNITS/MIN: 30-0.9/5 SOLUTION at 17:48

## 2020-08-13 RX ADMIN — BUTORPHANOL TARTRATE 2 MG: 2 INJECTION, SOLUTION INTRAMUSCULAR; INTRAVENOUS at 16:51

## 2020-08-13 RX ADMIN — SODIUM CHLORIDE, SODIUM LACTATE, POTASSIUM CHLORIDE, CALCIUM CHLORIDE AND DEXTROSE MONOHYDRATE 125 ML/HR: 5; 600; 310; 30; 20 INJECTION, SOLUTION INTRAVENOUS at 05:32

## 2020-08-13 RX ADMIN — PHENYLEPHRINE HYDROCHLORIDE 100 MCG: 10 INJECTION INTRAVENOUS at 22:47

## 2020-08-13 RX ADMIN — PROMETHAZINE HYDROCHLORIDE 12.5 MG: 25 INJECTION INTRAMUSCULAR; INTRAVENOUS at 19:34

## 2020-08-13 RX ADMIN — PHENYLEPHRINE HYDROCHLORIDE 100 MCG: 10 INJECTION INTRAVENOUS at 22:42

## 2020-08-13 RX ADMIN — FENTANYL CITRATE 100 MCG: 50 INJECTION, SOLUTION INTRAMUSCULAR; INTRAVENOUS at 22:20

## 2020-08-13 RX ADMIN — PROMETHAZINE HYDROCHLORIDE 12.5 MG: 25 INJECTION INTRAMUSCULAR; INTRAVENOUS at 16:51

## 2020-08-13 RX ADMIN — SODIUM CHLORIDE 3 MILLION UNITS: 9 INJECTION, SOLUTION INTRAVENOUS at 09:37

## 2020-08-13 RX ADMIN — BUPIVACAINE HYDROCHLORIDE 8 ML: 2.5 INJECTION, SOLUTION EPIDURAL; INFILTRATION; INTRACAUDAL; PERINEURAL at 22:26

## 2020-08-13 RX ADMIN — SODIUM CHLORIDE, SODIUM LACTATE, POTASSIUM CHLORIDE, CALCIUM CHLORIDE AND DEXTROSE MONOHYDRATE 125 ML/HR: 5; 600; 310; 30; 20 INJECTION, SOLUTION INTRAVENOUS at 15:52

## 2020-08-13 RX ADMIN — MISOPROSTOL 25 MCG: 100 TABLET ORAL at 06:45

## 2020-08-13 RX ADMIN — EPHEDRINE SULFATE 10 MG: 50 INJECTION INTRAVENOUS at 22:39

## 2020-08-13 NOTE — PROGRESS NOTES
Patient with spontaneous rupture of membranes now 3 to 4 cm internal electrode placed because of difficulty monitoring.  Attempted to place IUPC but could not get in satisfactory position x2.  Clinical situation reviewed at length patient and  questions answered

## 2020-08-13 NOTE — H&P
Obstetric History and Physical    Chief complaint: Polyhydramnios on biophysical profile today; suspected developing macrosomia.        Patient is a 24 y.o. female  currently at 38w5d, who presents with patient presents requesting induction of labor.  On ultrasound today there had been a pocket that was greater than 8 cm per  group protocol this qualifies for polyhydramnios.  She is approaching 39 weeks on 2020.  After discussing options of delivery at 39 weeks versus following for onset of labor she wants induction of labor at 39 weeks.  I discussed the advantages of waiting spontaneous onset of labor with polyhydramnios particularly since this is borderline.  I did discuss the increased risk of stillbirth with polyhydramnios.  Also there has been by ultrasound concerned about excess fetal weight.  Projected fetal weight at 39 weeks right at 4000 g diabetic testing has been negative the risk with induction of labor of shoulder dystocia is reviewed at length.  Risk of having hypertonic contractions damage to mother baby reviewed.  Risk of iatrogenic  section and its risk reviewed. Both the short-term and long-term risks of  section are reviewed at length.  Short-term risks of bleeding, infection, problems with wound healing, damage to bowel, bladder or ureter.  Small, but real risk of maternal mortality is reviewed and understood.    Long-term risks include in future pregnancies accreta abruption, uterine rupture and stillbirth, among others might be ameliorated by a .  The patient and her family have been given opportunity to ask questions and these questions have been answered to their satisfaction.    Her prenatal care is has been through [facility].  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 Bauman in [place]     Obstetric History   #:  1, Date: None, Sex: None, Weight: None, GA: None, Delivery: None, Apgar1: None, Apgar5: None, Living: None, Birth Comments: None       The following portions of the patients history were reviewed and updated as appropriate: current medications, allergies, past medical history, past surgical history, past family history, past social history and problem list .       Prenatal Information:  Prenatal Results     POC Urine Glucose/Protein     Test Value Reference Range Date Time    Urine Glucose        Urine Protein              Initial Prenatal Labs     Test Value Reference Range Date Time    Hemoglobin 10.3 g/dL 12.0 - 16.0 04/07/20 0454      11.5 g/dL 12.0 - 16.0 03/25/20 1111      12.1 g/dL 12.0 - 15.9 03/03/20 1535      13.5 g/dL 12.0 - 15.9 01/09/20 0915    Hematocrit 31.9 % 36.0 - 46.0 04/07/20 0454      34.1 % 36.0 - 46.0 03/25/20 1111      34.5 % 34.0 - 46.6 03/03/20 1535      37.7 % 34.0 - 46.6 01/09/20 0915    Platelets 289 10*3/mm3 140 - 450 08/05/20 1826      237 10*3/uL 140 - 440 04/07/20 0454      252 10*3/uL 140 - 440 03/25/20 1111      269 10*3/mm3 140 - 450 03/03/20 1535      276 10*3/mm3 140 - 450 01/09/20 0915    Rubella IgG Positive   01/09/20 0915    Hepatitis B SAg Non-Reactive  Non-Reactive 01/09/20 0915    Hepatitis C Ab Non-Reactive  Non-Reactive 01/09/20 0915    RPR Non-Reactive  Non-Reactive 01/09/20 0915    ABO O   03/03/20 1535    Rh Positive   03/03/20 1535    Antibody Screen Negative   04/06/20 0459      Negative   01/09/20 0915    HIV Non-Reactive  Non-Reactive 01/09/20 0915    Urine Culture <10,000 CFU/mL Mixed Елена Isolated   01/09/20 0915    Gonorrhea Negative  Negative 01/09/20 0944    Chlamydia Negative  Negative 01/09/20 0944    TSH 1.22 uIU/ml 0.46 - 4.68 12/29/16 1140          2nd and 3rd Trimester     Test Value Reference Range Date Time    Hemoglobin (repeated) 11.4 g/dL 12.0 - 15.9 08/05/20 1826    Hematocrit (repeated) 33.6 % 34.0 - 46.6 08/05/20 1826    GCT 90 mg/dL 60 - 140  06/15/20 0930      110 mg/dL 60 - 140 01/16/20 0918    Antibody Screen (repeated)        GTT Fasting        GTT 1 Hr        GTT 2 Hr        GTT 3 Hr        Group B Strep Positive  Negative 07/20/20 1134          Drug Screening     Test Value Reference Range Date Time    Amphetamine Screen Negative  Negative 01/09/20 0915    Barbiturate Screen Negative  Negative 01/09/20 0915    Benzodiazepine Screen Negative  Negative 01/09/20 0915    Methadone Screen Negative  Negative 01/09/20 0915    Phencyclidine Screen Negative  Negative 01/09/20 0915    Opiates Screen Negative  Negative 01/09/20 0915    THC Screen Positive  Negative 01/09/20 0915    Cocaine Screen Negative  Negative 01/09/20 0915    Propoxyphene Screen Negative  Negative 01/09/20 0915    Buprenorphine Screen Negative  Negative 01/09/20 0915    Methamphetamine Screen Negative  Negative 01/09/20 0915    Oxycodone Screen Negative  Negative 01/09/20 0915    Tricyclic Antidepressants Screen Negative  Negative 01/09/20 0915          Other (Risk screening)     Test Value Reference Range Date Time    Varicella IgG        Parvovirus IgG        CMV IgG        Cystic Fibrosis        Hemoglobin electrophoresis        NIPT        MSAFP-4        AFP (for NTD only)                  External Prenatal Results     Pregnancy Outside Results - Transcribed From Office Records - See Scanned Records For Details     Test Value Date Time    Hgb 11.4 g/dL 08/05/20 1826      10.3 g/dL 04/07/20 0454      11.5 g/dL 03/25/20 1111      12.1 g/dL 03/03/20 1535      13.5 g/dL 01/09/20 0915    Hct 33.6 % 08/05/20 1826      31.9 % 04/07/20 0454      34.1 % 03/25/20 1111      34.5 % 03/03/20 1535      37.7 % 01/09/20 0915    ABO O  03/03/20 1535    Rh Positive  03/03/20 1535    Antibody Screen Negative  04/06/20 0459      Negative  01/09/20 0915    Glucose Fasting GTT       Glucose Tolerance Test 1 hour       Glucose Tolerance Test 3 hour       Gonorrhea (discrete) Negative  01/09/20 0944     Chlamydia (discrete) Negative  20 0944    RPR Non-Reactive  2015    VDRL       Syphilis Antibody       Rubella Positive  20    HBsAg Non-Reactive  20    Herpes Simplex Virus PCR       Herpes Simplex VIrus Culture       HIV Non-Reactive  20    Hep C RNA Quant PCR       Hep C Antibody Non-Reactive  20 0915    AFP       Group B Strep Positive  20 1134    GBS Susceptibility to Clindamycin       GBS Susceptibility to Erythromycin       Fetal Fibronectin       Genetic Testing, Maternal Blood             Drug Screening     Test Value Date Time    Urine Drug Screen       Amphetamine Screen Negative  20    Barbiturate Screen Negative  20    Benzodiazepine Screen Negative  20    Methadone Screen Negative  20    Phencyclidine Screen Negative  20    Opiates Screen Negative  20    THC Screen Positive  20    Cocaine Screen       Propoxyphene Screen Negative  20    Buprenorphine Screen Negative  20    Methamphetamine Screen       Oxycodone Screen Negative  20    Tricyclic Antidepressants Screen Negative  20                 Past OB History:     OB History    Para Term  AB Living   1 0 0 0 0 0   SAB TAB Ectopic Molar Multiple Live Births   0 0 0 0 0 0      # Outcome Date GA Lbr Eliu/2nd Weight Sex Delivery Anes PTL Lv   1 Current                 ALLERGIES:   No Known Allergies     Home Medications:     Prior to Admission medications    Medication Sig Start Date End Date Taking? Authorizing Provider   Prenatal Vit-Fe Fumarate-FA (PRENATAL VITAMIN 27-0.8) 27-0.8 MG tablet tablet Take 1 tablet by mouth Daily.   Yes ProviderYamileth MD   docusate sodium (COLACE) 100 MG capsule Take 100 mg by mouth Daily. 20  ProviderYamileth MD   fluconazole (Diflucan) 150 MG tablet Take 1 tablet by mouth Every 3 (Three) Days. Take 1 dose  now and then again in 3 days. 7/25/20 8/12/20  Kina Reyez MD   ondansetron (Zofran) 4 MG tablet Take 1 tablet by mouth Every 8 (Eight) Hours As Needed for Nausea or Vomiting. 7/25/20 8/12/20  Kina Reyez MD       Past Medical History: Past Medical History:   Diagnosis Date   • Acne vulgaris    • Allergic rhinitis    • Anxiety    • Asthma    • Attention deficit hyperactivity disorder    • Chlamydia    • Conductive hearing loss     bilateral     • Depression    • Dysfunction of eustachian tube    • Elbow fracture, left    • Encounter for routine gynecological examination    • Epistaxis    • Gonorrhea    • Hand pain     right contusion      • Headache    • Heart murmur    • Herpetic maría    • High risk sexual behavior    • History of respiratory therapy 09/21/2011    Nebulizer Treatment 10419 (1) - DANI Johnson   • Insulin resistance 1/3/2017   • Irregular periods    • Laceration of foot    • Malaise and fatigue    • Mallet finger    • Myopia    • Nausea and vomiting    • Otalgia    • Otitis media    • Pain in wrist    • Pediculosis capitis    • Polycystic ovary syndrome    • PONV (postoperative nausea and vomiting)    • Scoliosis    • Sprain of foot, left    • Syncope, near    • Upper respiratory infection    • Verruca plantaris     right great toe         Past Surgical History Past Surgical History:   Procedure Laterality Date   • ADENOIDECTOMY  04/01/2004   • CAUTERIZATION NASAL BLEEDERS  04/01/2004    CAUTERIZATION INNER NOSE 85270 (1)   • OTHER SURGICAL HISTORY  04/01/2004    INCISION OF EARDRUM GENERAL ANESTHETIC 82661 (3) - BILATERAL TUBE IMPLANTS   • OVARIAN CYST SURGERY Left     pt states at 5 months pregnant cyst, left ovary and left fallopian tube were removed surgically.    • TONSILLECTOMY  2001   • WISDOM TOOTH EXTRACTION        Family History: Family History   Problem Relation Age of Onset   • Hypertension Father    • Supraventricular tachycardia Father    • No Known  Problems Brother    • No Known Problems Sister    • Diabetes Paternal Grandfather    • Fibromyalgia Paternal Grandmother    • Breast cancer Maternal Grandmother    • Diabetes Maternal Grandfather    • Prostate cancer Maternal Grandfather    • Stomach cancer Maternal Grandfather       Social History:  reports that she has been smoking cigarettes. She has been smoking about 0.25 packs per day. She has never used smokeless tobacco.   reports that she drank alcohol.   reports that she does not use drugs.        Review of Systems                                                                                                                  Neuro no history of brain tumor    HENT no history of ear tumors    Eye no history of retinal tumors    Pulmonary no history of lung tumors    Cardiac no history of cardiac tumors    GI: No history of small bowel tumors    Musculoskeletal: No history of skeletal muscle tumors    Endocrine: No history of adrenal tumors    Lymphatic: No history of Hodgkin's disease    Renal: No history of renal cancer      Objective     Documented Vitals    08/12/20 1157   BP: 132/74   Weight: 134 kg (294 lb 6.4 oz)          OBGyn Exam  Constitutional: Appears to be in no acute distress; Eyes: sclera normal; Endocrine system: thyroid palpate is normal; Pulmonary system: lungs clear; Cardiovascular system: heart regular rate and rhythm; Gastrointestinal system: abdomen soft nontender, active bowel sounds; Urologic system: CVA negative; Psychiatric: appropriate insight; Neurologic: gait within normal limits cervix is 1-2, 50% -1-2      Last Labs  Lab Results   Component Value Date    WBC 11.68 (H) 08/05/2020    RBC 3.97 08/05/2020    HGB 11.4 (L) 08/05/2020    HCT 33.6 (L) 08/05/2020    MCV 84.6 08/05/2020    MCH 28.7 08/05/2020    MCHC 33.9 08/05/2020    RDW 14.7 08/05/2020    RDWSD 45.2 08/05/2020    MPV 9.4 08/05/2020     08/05/2020        Lab Results   Component Value Date    GLUCOSE 124 (H)  2020    BUN 8 2020    CREATININE 0.43 (L) 2020     (L) 2020    K 3.8 2020     2020    CO2 17.0 (L) 2020    CALCIUM 8.9 2020    PROTEINTOT 6.6 2020    ALBUMIN 3.50 2020    ALT 13 2020    AST 12 2020    ALKPHOS 143 (H) 2020    BILITOT <0.2 2020    EGFRIFNONA >150 2020    GLOB 3.1 2020    AGRATIO 1.1 2020    BCR 18.6 2020    ANIONGAP 11.0 2020       No results found for: HCGQUAL      Assessment/Plan:  1. 24 y.o. [id number].  Polyhydramnios possible developing macrosomia with estimated fetal weight at 39 weeks of about 4000 g after reviewing the risk benefits alternatives including the risk of shoulder dystocia and its implication were gone a plan to admit at 38-6/7 for cervical preparation    Dilia Olivas and I have discussed pain goals for this hospitalization after reviewing her current clinical condition, medical history and prior pain experiences.  The goal is to keep her pain level 3.  To help achieve this, I plan to use modal pain management.       This document has been electronically signed by Kale Avila MD on 2020 22:09\    Please note that portions of this note were completed with a voice recognition program.

## 2020-08-13 NOTE — INTERVAL H&P NOTE
H&P updated. The patient was examined and .  Patient 2 to 3 cm Cytotec 25 placed vaginally after risks benefits alternatives and Cook balloon placed with 80/80

## 2020-08-13 NOTE — H&P (VIEW-ONLY)
Obstetric History and Physical    Chief complaint: Polyhydramnios on biophysical profile today; suspected developing macrosomia.        Patient is a 24 y.o. female  currently at 38w5d, who presents with patient presents requesting induction of labor.  On ultrasound today there had been a pocket that was greater than 8 cm per  group protocol this qualifies for polyhydramnios.  She is approaching 39 weeks on 2020.  After discussing options of delivery at 39 weeks versus following for onset of labor she wants induction of labor at 39 weeks.  I discussed the advantages of waiting spontaneous onset of labor with polyhydramnios particularly since this is borderline.  I did discuss the increased risk of stillbirth with polyhydramnios.  Also there has been by ultrasound concerned about excess fetal weight.  Projected fetal weight at 39 weeks right at 4000 g diabetic testing has been negative the risk with induction of labor of shoulder dystocia is reviewed at length.  Risk of having hypertonic contractions damage to mother baby reviewed.  Risk of iatrogenic  section and its risk reviewed. Both the short-term and long-term risks of  section are reviewed at length.  Short-term risks of bleeding, infection, problems with wound healing, damage to bowel, bladder or ureter.  Small, but real risk of maternal mortality is reviewed and understood.    Long-term risks include in future pregnancies accreta abruption, uterine rupture and stillbirth, among others might be ameliorated by a .  The patient and her family have been given opportunity to ask questions and these questions have been answered to their satisfaction.    Her prenatal care is has been through [facility].  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 Bauman in [place]     Obstetric History   #:  1, Date: None, Sex: None, Weight: None, GA: None, Delivery: None, Apgar1: None, Apgar5: None, Living: None, Birth Comments: None       The following portions of the patients history were reviewed and updated as appropriate: current medications, allergies, past medical history, past surgical history, past family history, past social history and problem list .       Prenatal Information:  Prenatal Results     POC Urine Glucose/Protein     Test Value Reference Range Date Time    Urine Glucose        Urine Protein              Initial Prenatal Labs     Test Value Reference Range Date Time    Hemoglobin 10.3 g/dL 12.0 - 16.0 04/07/20 0454      11.5 g/dL 12.0 - 16.0 03/25/20 1111      12.1 g/dL 12.0 - 15.9 03/03/20 1535      13.5 g/dL 12.0 - 15.9 01/09/20 0915    Hematocrit 31.9 % 36.0 - 46.0 04/07/20 0454      34.1 % 36.0 - 46.0 03/25/20 1111      34.5 % 34.0 - 46.6 03/03/20 1535      37.7 % 34.0 - 46.6 01/09/20 0915    Platelets 289 10*3/mm3 140 - 450 08/05/20 1826      237 10*3/uL 140 - 440 04/07/20 0454      252 10*3/uL 140 - 440 03/25/20 1111      269 10*3/mm3 140 - 450 03/03/20 1535      276 10*3/mm3 140 - 450 01/09/20 0915    Rubella IgG Positive   01/09/20 0915    Hepatitis B SAg Non-Reactive  Non-Reactive 01/09/20 0915    Hepatitis C Ab Non-Reactive  Non-Reactive 01/09/20 0915    RPR Non-Reactive  Non-Reactive 01/09/20 0915    ABO O   03/03/20 1535    Rh Positive   03/03/20 1535    Antibody Screen Negative   04/06/20 0459      Negative   01/09/20 0915    HIV Non-Reactive  Non-Reactive 01/09/20 0915    Urine Culture <10,000 CFU/mL Mixed Елена Isolated   01/09/20 0915    Gonorrhea Negative  Negative 01/09/20 0944    Chlamydia Negative  Negative 01/09/20 0944    TSH 1.22 uIU/ml 0.46 - 4.68 12/29/16 1140          2nd and 3rd Trimester     Test Value Reference Range Date Time    Hemoglobin (repeated) 11.4 g/dL 12.0 - 15.9 08/05/20 1826    Hematocrit (repeated) 33.6 % 34.0 - 46.6 08/05/20 1826    GCT 90 mg/dL 60 - 140  06/15/20 0930      110 mg/dL 60 - 140 01/16/20 0918    Antibody Screen (repeated)        GTT Fasting        GTT 1 Hr        GTT 2 Hr        GTT 3 Hr        Group B Strep Positive  Negative 07/20/20 1134          Drug Screening     Test Value Reference Range Date Time    Amphetamine Screen Negative  Negative 01/09/20 0915    Barbiturate Screen Negative  Negative 01/09/20 0915    Benzodiazepine Screen Negative  Negative 01/09/20 0915    Methadone Screen Negative  Negative 01/09/20 0915    Phencyclidine Screen Negative  Negative 01/09/20 0915    Opiates Screen Negative  Negative 01/09/20 0915    THC Screen Positive  Negative 01/09/20 0915    Cocaine Screen Negative  Negative 01/09/20 0915    Propoxyphene Screen Negative  Negative 01/09/20 0915    Buprenorphine Screen Negative  Negative 01/09/20 0915    Methamphetamine Screen Negative  Negative 01/09/20 0915    Oxycodone Screen Negative  Negative 01/09/20 0915    Tricyclic Antidepressants Screen Negative  Negative 01/09/20 0915          Other (Risk screening)     Test Value Reference Range Date Time    Varicella IgG        Parvovirus IgG        CMV IgG        Cystic Fibrosis        Hemoglobin electrophoresis        NIPT        MSAFP-4        AFP (for NTD only)                  External Prenatal Results     Pregnancy Outside Results - Transcribed From Office Records - See Scanned Records For Details     Test Value Date Time    Hgb 11.4 g/dL 08/05/20 1826      10.3 g/dL 04/07/20 0454      11.5 g/dL 03/25/20 1111      12.1 g/dL 03/03/20 1535      13.5 g/dL 01/09/20 0915    Hct 33.6 % 08/05/20 1826      31.9 % 04/07/20 0454      34.1 % 03/25/20 1111      34.5 % 03/03/20 1535      37.7 % 01/09/20 0915    ABO O  03/03/20 1535    Rh Positive  03/03/20 1535    Antibody Screen Negative  04/06/20 0459      Negative  01/09/20 0915    Glucose Fasting GTT       Glucose Tolerance Test 1 hour       Glucose Tolerance Test 3 hour       Gonorrhea (discrete) Negative  01/09/20 0944     Chlamydia (discrete) Negative  20 0944    RPR Non-Reactive  2015    VDRL       Syphilis Antibody       Rubella Positive  20    HBsAg Non-Reactive  20    Herpes Simplex Virus PCR       Herpes Simplex VIrus Culture       HIV Non-Reactive  20    Hep C RNA Quant PCR       Hep C Antibody Non-Reactive  20 0915    AFP       Group B Strep Positive  20 1134    GBS Susceptibility to Clindamycin       GBS Susceptibility to Erythromycin       Fetal Fibronectin       Genetic Testing, Maternal Blood             Drug Screening     Test Value Date Time    Urine Drug Screen       Amphetamine Screen Negative  20    Barbiturate Screen Negative  20    Benzodiazepine Screen Negative  20    Methadone Screen Negative  20    Phencyclidine Screen Negative  20    Opiates Screen Negative  20    THC Screen Positive  20    Cocaine Screen       Propoxyphene Screen Negative  20    Buprenorphine Screen Negative  20    Methamphetamine Screen       Oxycodone Screen Negative  20    Tricyclic Antidepressants Screen Negative  20                 Past OB History:     OB History    Para Term  AB Living   1 0 0 0 0 0   SAB TAB Ectopic Molar Multiple Live Births   0 0 0 0 0 0      # Outcome Date GA Lbr Eliu/2nd Weight Sex Delivery Anes PTL Lv   1 Current                 ALLERGIES:   No Known Allergies     Home Medications:     Prior to Admission medications    Medication Sig Start Date End Date Taking? Authorizing Provider   Prenatal Vit-Fe Fumarate-FA (PRENATAL VITAMIN 27-0.8) 27-0.8 MG tablet tablet Take 1 tablet by mouth Daily.   Yes ProviderYamileth MD   docusate sodium (COLACE) 100 MG capsule Take 100 mg by mouth Daily. 20  ProviderYamileth MD   fluconazole (Diflucan) 150 MG tablet Take 1 tablet by mouth Every 3 (Three) Days. Take 1 dose  now and then again in 3 days. 7/25/20 8/12/20  Kina Reyez MD   ondansetron (Zofran) 4 MG tablet Take 1 tablet by mouth Every 8 (Eight) Hours As Needed for Nausea or Vomiting. 7/25/20 8/12/20  Kina Reyez MD       Past Medical History: Past Medical History:   Diagnosis Date   • Acne vulgaris    • Allergic rhinitis    • Anxiety    • Asthma    • Attention deficit hyperactivity disorder    • Chlamydia    • Conductive hearing loss     bilateral     • Depression    • Dysfunction of eustachian tube    • Elbow fracture, left    • Encounter for routine gynecological examination    • Epistaxis    • Gonorrhea    • Hand pain     right contusion      • Headache    • Heart murmur    • Herpetic maría    • High risk sexual behavior    • History of respiratory therapy 09/21/2011    Nebulizer Treatment 67728 (1) - DANI Johnson   • Insulin resistance 1/3/2017   • Irregular periods    • Laceration of foot    • Malaise and fatigue    • Mallet finger    • Myopia    • Nausea and vomiting    • Otalgia    • Otitis media    • Pain in wrist    • Pediculosis capitis    • Polycystic ovary syndrome    • PONV (postoperative nausea and vomiting)    • Scoliosis    • Sprain of foot, left    • Syncope, near    • Upper respiratory infection    • Verruca plantaris     right great toe         Past Surgical History Past Surgical History:   Procedure Laterality Date   • ADENOIDECTOMY  04/01/2004   • CAUTERIZATION NASAL BLEEDERS  04/01/2004    CAUTERIZATION INNER NOSE 84793 (1)   • OTHER SURGICAL HISTORY  04/01/2004    INCISION OF EARDRUM GENERAL ANESTHETIC 67688 (3) - BILATERAL TUBE IMPLANTS   • OVARIAN CYST SURGERY Left     pt states at 5 months pregnant cyst, left ovary and left fallopian tube were removed surgically.    • TONSILLECTOMY  2001   • WISDOM TOOTH EXTRACTION        Family History: Family History   Problem Relation Age of Onset   • Hypertension Father    • Supraventricular tachycardia Father    • No Known  Problems Brother    • No Known Problems Sister    • Diabetes Paternal Grandfather    • Fibromyalgia Paternal Grandmother    • Breast cancer Maternal Grandmother    • Diabetes Maternal Grandfather    • Prostate cancer Maternal Grandfather    • Stomach cancer Maternal Grandfather       Social History:  reports that she has been smoking cigarettes. She has been smoking about 0.25 packs per day. She has never used smokeless tobacco.   reports that she drank alcohol.   reports that she does not use drugs.        Review of Systems                                                                                                                  Neuro no history of brain tumor    HENT no history of ear tumors    Eye no history of retinal tumors    Pulmonary no history of lung tumors    Cardiac no history of cardiac tumors    GI: No history of small bowel tumors    Musculoskeletal: No history of skeletal muscle tumors    Endocrine: No history of adrenal tumors    Lymphatic: No history of Hodgkin's disease    Renal: No history of renal cancer      Objective     Documented Vitals    08/12/20 1157   BP: 132/74   Weight: 134 kg (294 lb 6.4 oz)          OBGyn Exam  Constitutional: Appears to be in no acute distress; Eyes: sclera normal; Endocrine system: thyroid palpate is normal; Pulmonary system: lungs clear; Cardiovascular system: heart regular rate and rhythm; Gastrointestinal system: abdomen soft nontender, active bowel sounds; Urologic system: CVA negative; Psychiatric: appropriate insight; Neurologic: gait within normal limits cervix is 1-2, 50% -1-2      Last Labs  Lab Results   Component Value Date    WBC 11.68 (H) 08/05/2020    RBC 3.97 08/05/2020    HGB 11.4 (L) 08/05/2020    HCT 33.6 (L) 08/05/2020    MCV 84.6 08/05/2020    MCH 28.7 08/05/2020    MCHC 33.9 08/05/2020    RDW 14.7 08/05/2020    RDWSD 45.2 08/05/2020    MPV 9.4 08/05/2020     08/05/2020        Lab Results   Component Value Date    GLUCOSE 124 (H)  2020    BUN 8 2020    CREATININE 0.43 (L) 2020     (L) 2020    K 3.8 2020     2020    CO2 17.0 (L) 2020    CALCIUM 8.9 2020    PROTEINTOT 6.6 2020    ALBUMIN 3.50 2020    ALT 13 2020    AST 12 2020    ALKPHOS 143 (H) 2020    BILITOT <0.2 2020    EGFRIFNONA >150 2020    GLOB 3.1 2020    AGRATIO 1.1 2020    BCR 18.6 2020    ANIONGAP 11.0 2020       No results found for: HCGQUAL      Assessment/Plan:  1. 24 y.o. [id number].  Polyhydramnios possible developing macrosomia with estimated fetal weight at 39 weeks of about 4000 g after reviewing the risk benefits alternatives including the risk of shoulder dystocia and its implication were gone a plan to admit at 38-6/7 for cervical preparation    Dilia Olivas and I have discussed pain goals for this hospitalization after reviewing her current clinical condition, medical history and prior pain experiences.  The goal is to keep her pain level 3.  To help achieve this, I plan to use modal pain management.       This document has been electronically signed by Kale Avila MD on 2020 22:09\    Please note that portions of this note were completed with a voice recognition program.

## 2020-08-13 NOTE — PROGRESS NOTES
"CC: Prenatal visit    Dilia Olivas is a 24 y.o.  at 38w5d.  Doing well.  Denies contractions, LOF, or VB.  Reports good FM.    /74   Wt 134 kg (294 lb 6.4 oz)   LMP 10/22/2019 (Approximate)   BMI 44.76 kg/m²   SVE: 1  Fundal Height (cm): 40 cm  Fetal Heart Rate: 144     Problems (from 20 to present)     Problem Noted Resolved    Excessive fetal growth affecting management of mother in third trimester, antepartum 2020 by Kale Avila MD No    Priority:  High      Mother positive for group B Streptococcus colonization 2020 by Kale Avila MD No    Priority:  High      Pelvic mass during pregnancy 3/23/2020 by Kale Avila MD No    Priority:  High      Overview Signed 2020  6:52 PM by Kina Reyez MD     S/p laparoscopic LSO on  -> serous cystadenoma         Insulin resistance complicating pregnancy 2020 by Kina Reyez MD No    Overview Signed 2020  6:53 PM by Kina Reyez MD     PCOS         Urinary tract infection in mother during third trimester of pregnancy 2020 by Kina Reyez MD No    Overview Signed 2020  7:51 PM by Kina Reyez MD     Diagnosed ; Macrobid course         High-risk pregnancy in third trimester 3/11/2020 by Guille Lopes DO No    Overview Signed 3/11/2020  3:10 PM by Guille Lopes DO     Patient with 2 large ovarian cysts both measuring 9 cm we will continue to follow closely per MFM recommendations.         Drug use affecting pregnancy in first trimester 2020 by Zahraa Sanchez APRN No    Overview Addendum 2020  6:53 PM by Kina Reyez MD     Quit MJ use on          Tobacco smoking affecting pregnancy in third trimester 2020 by Zahraa Sanchez, APRN No    Overview Signed 2020  2:46 PM by Zahraa Sanchez APRN     3-5 per day \"trying to quit\"         Complex cyst of right " ovary 2020 by Zahraa Sanchez APRN 2020 by Guille Lopes, DO    Overview Signed 2020  1:32 PM by Zahraa Sanchez, KIM     14cm; u/s f/u on                A/P: Dilia Olivas is a 24 y.o.  at 38w5d.     - Reviewed COVID-19 visitation policy  - Reviewed COVID-19 precautions     Diagnosis Plan   1. 38 weeks gestation of pregnancy     2. Excessive fetal growth affecting management of pregnancy in third trimester, single or unspecified fetus   biophysical profile today is 8/8.  There is one pocket that is greater than 8 so per  group protocol I think she qualifies for polyhydramnios.  She is approaching 39 weeks on vaginal examination she is 1 to 250% -1 to -2.  discussed waiting spontaneous onset of labor versus delivering at 39 weeks for polyhydramnios.  American fetal maternal Society recommendations discussed.  She wants to plan for delivery at 39 weeks we are going to plan to admit for cervical preparation   3. Mother positive for group B Streptococcus colonization     4. Insulin resistance complicating pregnancy     5. Urinary tract infection in mother during third trimester of pregnancy     6. High-risk pregnancy in third trimester     7. Drug use affecting pregnancy in first trimester     8. Tobacco smoking affecting pregnancy in third trimester     9. Polyhydramnios in third trimester complication, single or unspecified fetus       Kale Avila MD  2020  21:56

## 2020-08-13 NOTE — PLAN OF CARE
Problem: Patient Care Overview  Goal: Plan of Care Review  Flowsheets  Taken 8/13/2020 4945  Progress: improving  Outcome Summary: Patient started on pitocin at 1748, IV pcn given for GBS+ results, 3-4cm dilated at this time. Tolerating labor pain well, one dose of stadol given and effective. Pt does want an epidural later. Scalp electrode placed on baby at 1707. Will continue to monitor for change.  Taken 8/13/2020 9418  Plan of Care Reviewed With: patient;spouse

## 2020-08-13 NOTE — NURSING NOTE
Pt moving around in bed a lot. Unable to start pit at this time until we get a 30 min strip. Adjusting US repeatedly.

## 2020-08-14 PROBLEM — Z3A.37 37 WEEKS GESTATION OF PREGNANCY: Status: RESOLVED | Noted: 2020-08-04 | Resolved: 2020-08-14

## 2020-08-14 PROBLEM — Z3A.36 36 WEEKS GESTATION OF PREGNANCY: Status: RESOLVED | Noted: 2020-07-27 | Resolved: 2020-08-14

## 2020-08-14 PROBLEM — E66.01 MORBID OBESITY WITH BMI OF 40.0-44.9, ADULT (HCC): Status: RESOLVED | Noted: 2020-07-27 | Resolved: 2020-08-14

## 2020-08-14 PROBLEM — O40.3XX0 POLYHYDRAMNIOS IN THIRD TRIMESTER: Status: RESOLVED | Noted: 2020-08-12 | Resolved: 2020-08-14

## 2020-08-14 PROBLEM — Z3A.38 38 WEEKS GESTATION OF PREGNANCY: Status: RESOLVED | Noted: 2020-08-12 | Resolved: 2020-08-14

## 2020-08-14 LAB
BACTERIA UR QL AUTO: ABNORMAL /HPF
BILIRUB UR QL STRIP: NEGATIVE
CLARITY UR: CLEAR
COLOR UR: YELLOW
CREAT UR-MCNC: 24.3 MG/DL
GLUCOSE UR STRIP-MCNC: NEGATIVE MG/DL
HGB UR QL STRIP.AUTO: ABNORMAL
HYALINE CASTS UR QL AUTO: ABNORMAL /LPF
KETONES UR QL STRIP: NEGATIVE
LEUKOCYTE ESTERASE UR QL STRIP.AUTO: NEGATIVE
NITRITE UR QL STRIP: NEGATIVE
PH UR STRIP.AUTO: 7.5 [PH] (ref 5–9)
PROT UR QL STRIP: NEGATIVE
PROT UR-MCNC: 4 MG/DL
PROT/CREAT UR: 164.6 MG/G CREA (ref 0–200)
RBC # UR: ABNORMAL /HPF
REF LAB TEST METHOD: ABNORMAL
SP GR UR STRIP: 1 (ref 1–1.03)
SQUAMOUS #/AREA URNS HPF: ABNORMAL /HPF
UROBILINOGEN UR QL STRIP: ABNORMAL
WBC UR QL AUTO: ABNORMAL /HPF

## 2020-08-14 PROCEDURE — 25010000003 PENICILLIN G POTASSIUM PER 600000 UNITS: Performed by: OBSTETRICS & GYNECOLOGY

## 2020-08-14 PROCEDURE — 84156 ASSAY OF PROTEIN URINE: CPT | Performed by: OBSTETRICS & GYNECOLOGY

## 2020-08-14 PROCEDURE — 81001 URINALYSIS AUTO W/SCOPE: CPT | Performed by: OBSTETRICS & GYNECOLOGY

## 2020-08-14 PROCEDURE — 59409 OBSTETRICAL CARE: CPT | Performed by: OBSTETRICS & GYNECOLOGY

## 2020-08-14 PROCEDURE — 82570 ASSAY OF URINE CREATININE: CPT | Performed by: OBSTETRICS & GYNECOLOGY

## 2020-08-14 PROCEDURE — C1755 CATHETER, INTRASPINAL: HCPCS

## 2020-08-14 PROCEDURE — 51703 INSERT BLADDER CATH COMPLEX: CPT

## 2020-08-14 RX ORDER — CARBOPROST TROMETHAMINE 250 UG/ML
250 INJECTION, SOLUTION INTRAMUSCULAR AS NEEDED
Status: DISCONTINUED | OUTPATIENT
Start: 2020-08-14 | End: 2020-08-14 | Stop reason: HOSPADM

## 2020-08-14 RX ORDER — METHYLERGONOVINE MALEATE 0.2 MG/ML
200 INJECTION INTRAVENOUS ONCE AS NEEDED
Status: DISCONTINUED | OUTPATIENT
Start: 2020-08-14 | End: 2020-08-14 | Stop reason: HOSPADM

## 2020-08-14 RX ORDER — SODIUM CHLORIDE 0.9 % (FLUSH) 0.9 %
1-10 SYRINGE (ML) INJECTION AS NEEDED
Status: DISCONTINUED | OUTPATIENT
Start: 2020-08-14 | End: 2020-08-16 | Stop reason: HOSPADM

## 2020-08-14 RX ORDER — OXYTOCIN/0.9 % SODIUM CHLORIDE 30/500 ML
650 PLASTIC BAG, INJECTION (ML) INTRAVENOUS ONCE
Status: DISCONTINUED | OUTPATIENT
Start: 2020-08-14 | End: 2020-08-16 | Stop reason: HOSPADM

## 2020-08-14 RX ORDER — MISOPROSTOL 200 UG/1
400 TABLET ORAL AS NEEDED
Status: DISCONTINUED | OUTPATIENT
Start: 2020-08-14 | End: 2020-08-14 | Stop reason: HOSPADM

## 2020-08-14 RX ORDER — HYDROCORTISONE 25 MG/G
1 CREAM TOPICAL AS NEEDED
Status: DISCONTINUED | OUTPATIENT
Start: 2020-08-14 | End: 2020-08-16 | Stop reason: HOSPADM

## 2020-08-14 RX ORDER — MISOPROSTOL 200 UG/1
800 TABLET ORAL AS NEEDED
Status: DISCONTINUED | OUTPATIENT
Start: 2020-08-14 | End: 2020-08-14

## 2020-08-14 RX ORDER — OXYTOCIN/0.9 % SODIUM CHLORIDE 30/500 ML
85 PLASTIC BAG, INJECTION (ML) INTRAVENOUS ONCE
Status: DISCONTINUED | OUTPATIENT
Start: 2020-08-14 | End: 2020-08-16 | Stop reason: HOSPADM

## 2020-08-14 RX ORDER — DOCUSATE SODIUM 100 MG/1
100 CAPSULE, LIQUID FILLED ORAL 2 TIMES DAILY
Status: DISCONTINUED | OUTPATIENT
Start: 2020-08-14 | End: 2020-08-16 | Stop reason: HOSPADM

## 2020-08-14 RX ORDER — BISACODYL 10 MG
10 SUPPOSITORY, RECTAL RECTAL DAILY PRN
Status: DISCONTINUED | OUTPATIENT
Start: 2020-08-15 | End: 2020-08-16 | Stop reason: HOSPADM

## 2020-08-14 RX ORDER — HYDROXYZINE 50 MG/1
50 TABLET, FILM COATED ORAL NIGHTLY PRN
Status: DISCONTINUED | OUTPATIENT
Start: 2020-08-14 | End: 2020-08-16 | Stop reason: HOSPADM

## 2020-08-14 RX ADMIN — BUPIVACAINE HYDROCHLORIDE 8 ML: 2.5 INJECTION, SOLUTION EPIDURAL; INFILTRATION; INTRACAUDAL; PERINEURAL at 04:43

## 2020-08-14 RX ADMIN — MISOPROSTOL 400 MCG: 200 TABLET ORAL at 08:10

## 2020-08-14 RX ADMIN — SODIUM CHLORIDE, SODIUM LACTATE, POTASSIUM CHLORIDE, CALCIUM CHLORIDE AND DEXTROSE MONOHYDRATE 125 ML/HR: 5; 600; 310; 30; 20 INJECTION, SOLUTION INTRAVENOUS at 00:42

## 2020-08-14 RX ADMIN — SODIUM CHLORIDE 3 MILLION UNITS: 9 INJECTION, SOLUTION INTRAVENOUS at 05:29

## 2020-08-14 RX ADMIN — DOCUSATE SODIUM 100 MG: 100 CAPSULE, LIQUID FILLED ORAL at 21:31

## 2020-08-14 NOTE — ANESTHESIA PROCEDURE NOTES
Labor Epidural      Patient reassessed immediately prior to procedure    Patient location during procedure: OB  Start Time: 8/13/2020 10:26 PM  Stop Time: 8/13/2020 10:26 PM  Indication:at surgeon's request  Performed By  Anesthesiologist: Landon Jauregui MD  CRNA: Nabor Kaye CRNA  Preanesthetic Checklist  Completed: patient identified, surgical consent, pre-op evaluation, timeout performed, IV checked, risks and benefits discussed and monitors and equipment checked  Prep:  Pt Position:sitting  Sterile Tech:cap, gloves, mask and sterile barrier  Prep:povidone-iodine 7.5% surgical scrub  Monitoring:blood pressure monitoring and continuous pulse oximetry  Epidural Block Procedure:  Approach:midline  Guidance:landmark technique and palpation technique  Location:L2-L3  Needle Type:Tuohy  Needle Gauge:17 G  Loss of Resistance Medium: saline  Paresthesia: none  Aspiration:negative  Test Dose:negative  Number of Attempts: 1  Post Assessment:  Dressing:occlusive dressing applied and secured with tape  Pt Tolerance:patient tolerated the procedure well with no apparent complications  Complications:no

## 2020-08-14 NOTE — L&D DELIVERY NOTE
[facility]  Vaginal Delivery Note    Delivery     Delivery: Vaginal, Spontaneous     YOB: 2018    Time of Birth:  Gestational Age 7:54 AM   39w0d     Anesthesia: Epidural     Delivering clinician: Kale Avila        Forceps?   No   Vacuum? No    Shoulder dystocia present: No        Delivery narrative: On 2020 Dilia Olivas at , delivered a viable male infant, Nick Ortiz, to a sterile field with epidural  anesthesia. Shoulders delivered without difficulty. Body was delivered with gentle traction, nuchal and body cord reduced, infant placed on mother's abdomen, WPDS (warmed, positioned, dried, stimulated), bulb suctioned. Cord clamped and cut after 1 minute of delayed clamping, by father at maternal request.  Cord blood collected. Placenta with 3 vessel cord delivered spontaneous appears intact. 20 units of Pitocin given. Infant delivered over intact perineum. Vulvar abrasion required two stitches.   EBL    200 mL per nursing first measurement. Infants weight 7 lb 11.8 oz (3510 g) . Apgars were 8   and 9   at one and five minutes, respectively. Infant and mother to recover in room.       Infant    Findings: male  infant     Infant observations: Weight: 3510 g (7 lb 11.8 oz)   Length: 20.5  in  Observations/Comments:         Apgars: 8   @ 1 minute /    9   @ 5 minutes   Infant Name: Nick Ortiz      Placenta, Cord, and Fluid    Placenta delivered  Spontaneous  at   2020  8:06 AM     Cord: 3 vessels  present.   Nuchal Cord?  yes; Number of nuchal loops present:       Cord blood obtained: Yes    Cord gases obtained:  No    Cord gas results: Venous:  No results found for: PHCVEN    Arterial:  No results found for: PHCART     Repair    Episiotomy: No      Lacerations: No   Estimated Blood Loss:  200 mL per nursing first count           Complications  Polyhydramnios     Disposition  Mother to Mother Baby/Postpartum  in stable condition currently.  Baby to remains with mom  in  stable condition currently.      This document has been electronically signed by Amado Brooks MD on August 14, 2020 08:49   I have seen and evaluated the patient.  I have discussed the case with the resident. I have reviewed the notes, assessment and plan, and/or procedures performed by the resident. I concur with the resident’s documentation.        This document has been electronically signed by Kale Avila MD on August 14, 2020 10:29

## 2020-08-14 NOTE — ANESTHESIA PREPROCEDURE EVALUATION
Anesthesia Evaluation     history of anesthetic complications: PONV               Airway   No difficulty expected and Possible difficult intubation  Dental      Pulmonary - normal exam   (+) asthma,recent URI,   Cardiovascular - normal exam    (+) valvular problems/murmurs,       Neuro/Psych  (+) headaches, psychiatric history,     GI/Hepatic/Renal/Endo    (+) obesity, morbid obesity,  diabetes mellitus gestational,     Musculoskeletal     Abdominal   (+) obese,    Substance History      OB/GYN    (+) Pregnant,         Other                        Anesthesia Plan    ASA 3 - emergent     epidural       Anesthetic plan, all risks, benefits, and alternatives have been provided, discussed and informed consent has been obtained with: patient.    Plan discussed with CRNA.

## 2020-08-14 NOTE — PROGRESS NOTES
Patient is 5 cm.    She requested epidural had some hypotension with secondary late decelerations after this has responded to therapy any the fetal heart rate strip looks better now with moderate variability and no.  Periodic changes.    Difficulty dosing oxytocin secondary to patient habitus.  I again tried to place an IUPC but was unsuccessful in getting in satisfactory position I discussed situation with nursing staff will have to use palpation to dose..  Oxytocin is off now we will leave off for 30 minutes to an hour and then will restart at 2.    Clinical situation reviewed at length with patient and family questions answered    Patient is having some mild blood pressure readings before epidural no headaches visual changes epigastric pain DTRs 2+/4 I think these were related to pain but I am going to obtain CBC CMP a UA and have a urine protein creatinine ratio sent out

## 2020-08-14 NOTE — PLAN OF CARE
Problem: Patient Care Overview  Goal: Plan of Care Review  Outcome: Ongoing (interventions implemented as appropriate)  Flowsheets (Taken 8/14/2020 0557)  Progress: improving  Plan of Care Reviewed With: patient  Outcome Summary: Patient 5cm dilated, epidural in place and was redosed this am, IV pcn given.

## 2020-08-14 NOTE — ANESTHESIA POSTPROCEDURE EVALUATION
Patient: Dilia Olivas    Procedure Summary     Date:  08/13/20 Room / Location:      Anesthesia Start:  2203 Anesthesia Stop:  08/14/20 1010    Procedure:  LABOR ANALGESIA Diagnosis:      Scheduled Providers:   Provider:  Nabor Kaye CRNA    Anesthesia Type:  epidural ASA Status:  3 - Emergent          Anesthesia Type: epidural    Vitals  Vitals Value Taken Time   /59 8/14/2020 10:21 AM   Temp 98.2 °F (36.8 °C) 8/14/2020  8:23 AM   Pulse 129 8/14/2020 10:21 AM   Resp 18 8/14/2020  8:23 AM   SpO2 100 % 8/13/2020 10:25 PM   Vitals shown include unvalidated device data.        Post Anesthesia Care and Evaluation    Patient location during evaluation: bedside  Patient participation: complete - patient participated  Level of consciousness: awake and alert  Pain management: adequate  Airway patency: patent  Anesthetic complications: No anesthetic complications  PONV Status: none  Cardiovascular status: acceptable  Respiratory status: acceptable  Hydration status: acceptable  Post Neuraxial Block status: Motor and sensory function returned to baseline and No signs or symptoms of PDPH

## 2020-08-14 NOTE — PAYOR COMM NOTE
"Dee Brock   [facility]  phone [phone]  fax [phone]    REF# [id number]    Harish Pablo (24 y.o. Female)     Date of Birth Social Security Number Address Home Phone MRN    [id number]  [address] [facility] [address] [phone] [id number]    [employer]ondo[employer] Marital Status          Hindu        Admission Date Admission Type Admitting Provider Attending Provider Department, Room/Bed    8/13/20 Elective Kale Avila MD Neely, Thomas S, MD [facility] LABOR DELIVERY, L769/1    Discharge Date Discharge Disposition Discharge Destination                       Attending Provider:  Kale Avila MD    Allergies:  No Known Allergies    Isolation:  None   Infection:  None   Code Status:  CPR    Ht:  172.7 cm (68\")   Wt:  134 kg (295 lb)    Admission Cmt:  None   Principal Problem:  Polyhydramnios [O40.9XX0]                 Active Insurance as of 8/13/2020     Primary Coverage     Payor Plan Insurance Group Employer/Plan Group    ANTHEM MEDICAID ANTHEM MEDICAID [id number]     Payor Plan Address Payor Plan Phone Number Payor Plan Fax Number Effective Dates    [address] [address] [phone]  9/1/2016 - None Entered    [facility] [phone]       Subscriber Name Subscriber Birth Date Member ID       HARISH PABLO 1995 [id number]                 Emergency Contacts      (Rel.) Home Phone Work Phone Mobile Phone    Yaneth Ray (Mother) [phone] -- [phone]    MARQUITA PABLO (Spouse) [phone] -- [phone]               History & Physical      Kale Avila MD at 08/13/20 0838        H&P updated. The patient was examined and .  Patient 2 to 3 cm Cytotec 25 placed vaginally after risks benefits alternatives and Cook balloon placed with 80/80    Electronically signed by Kale Avila MD at 08/13/20 0839   Source Note            Obstetric History and Physical    Chief complaint: Polyhydramnios on biophysical profile " today; suspected developing macrosomia.        Patient is a 24 y.o. female  currently at 38w5d, who presents with patient presents requesting induction of labor.  On ultrasound today there had been a pocket that was greater than 8 cm per  group protocol this qualifies for polyhydramnios.  She is approaching 39 weeks on 2020.  After discussing options of delivery at 39 weeks versus following for onset of labor she wants induction of labor at 39 weeks.  I discussed the advantages of waiting spontaneous onset of labor with polyhydramnios particularly since this is borderline.  I did discuss the increased risk of stillbirth with polyhydramnios.  Also there has been by ultrasound concerned about excess fetal weight.  Projected fetal weight at 39 weeks right at 4000 g diabetic testing has been negative the risk with induction of labor of shoulder dystocia is reviewed at length.  Risk of having hypertonic contractions damage to mother baby reviewed.  Risk of iatrogenic  section and its risk reviewed. Both the short-term and long-term risks of  section are reviewed at length.  Short-term risks of bleeding, infection, problems with wound healing, damage to bowel, bladder or ureter.  Small, but real risk of maternal mortality is reviewed and understood.    Long-term risks include in future pregnancies accreta abruption, uterine rupture and stillbirth, among others might be ameliorated by a .  The patient and her family have been given opportunity to ask questions and these questions have been answered to their satisfaction.    Her prenatal care is has been through [facility].  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 Bauman in [place]     Obstetric History   #: 1, Date: None, Sex: None, Weight: None, GA: None, Delivery: None, Apgar1: None, Apgar5:  None, Living: None, Birth Comments: None       The following portions of the patients history were reviewed and updated as appropriate: current medications, allergies, past medical history, past surgical history, past family history, past social history and problem list .       Prenatal Information:  Prenatal Results     POC Urine Glucose/Protein     Test Value Reference Range Date Time    Urine Glucose        Urine Protein              Initial Prenatal Labs     Test Value Reference Range Date Time    Hemoglobin 10.3 g/dL 12.0 - 16.0 04/07/20 0454      11.5 g/dL 12.0 - 16.0 03/25/20 1111      12.1 g/dL 12.0 - 15.9 03/03/20 1535      13.5 g/dL 12.0 - 15.9 01/09/20 0915    Hematocrit 31.9 % 36.0 - 46.0 04/07/20 0454      34.1 % 36.0 - 46.0 03/25/20 1111      34.5 % 34.0 - 46.6 03/03/20 1535      37.7 % 34.0 - 46.6 01/09/20 0915    Platelets 289 10*3/mm3 140 - 450 08/05/20 1826      237 10*3/uL 140 - 440 04/07/20 0454      252 10*3/uL 140 - 440 03/25/20 1111      269 10*3/mm3 140 - 450 03/03/20 1535      276 10*3/mm3 140 - 450 01/09/20 0915    Rubella IgG Positive   01/09/20 0915    Hepatitis B SAg Non-Reactive  Non-Reactive 01/09/20 0915    Hepatitis C Ab Non-Reactive  Non-Reactive 01/09/20 0915    RPR Non-Reactive  Non-Reactive 01/09/20 0915    ABO O   03/03/20 1535    Rh Positive   03/03/20 1535    Antibody Screen Negative   04/06/20 0459      Negative   01/09/20 0915    HIV Non-Reactive  Non-Reactive 01/09/20 0915    Urine Culture <10,000 CFU/mL Mixed Елена Isolated   01/09/20 0915    Gonorrhea Negative  Negative 01/09/20 0944    Chlamydia Negative  Negative 01/09/20 0944    TSH 1.22 uIU/ml 0.46 - 4.68 12/29/16 1140          2nd and 3rd Trimester     Test Value Reference Range Date Time    Hemoglobin (repeated) 11.4 g/dL 12.0 - 15.9 08/05/20 1826    Hematocrit (repeated) 33.6 % 34.0 - 46.6 08/05/20 1826    GCT 90 mg/dL 60 - 140 06/15/20 0930      110 mg/dL 60 - 140 01/16/20 0918    Antibody Screen (repeated)         GTT Fasting        GTT 1 Hr        GTT 2 Hr        GTT 3 Hr        Group B Strep Positive  Negative 07/20/20 1134          Drug Screening     Test Value Reference Range Date Time    Amphetamine Screen Negative  Negative 01/09/20 0915    Barbiturate Screen Negative  Negative 01/09/20 0915    Benzodiazepine Screen Negative  Negative 01/09/20 0915    Methadone Screen Negative  Negative 01/09/20 0915    Phencyclidine Screen Negative  Negative 01/09/20 0915    Opiates Screen Negative  Negative 01/09/20 0915    THC Screen Positive  Negative 01/09/20 0915    Cocaine Screen Negative  Negative 01/09/20 0915    Propoxyphene Screen Negative  Negative 01/09/20 0915    Buprenorphine Screen Negative  Negative 01/09/20 0915    Methamphetamine Screen Negative  Negative 01/09/20 0915    Oxycodone Screen Negative  Negative 01/09/20 0915    Tricyclic Antidepressants Screen Negative  Negative 01/09/20 0915          Other (Risk screening)     Test Value Reference Range Date Time    Varicella IgG        Parvovirus IgG        CMV IgG        Cystic Fibrosis        Hemoglobin electrophoresis        NIPT        MSAFP-4        AFP (for NTD only)                  External Prenatal Results     Pregnancy Outside Results - Transcribed From Office Records - See Scanned Records For Details     Test Value Date Time    Hgb 11.4 g/dL 08/05/20 1826      10.3 g/dL 04/07/20 0454      11.5 g/dL 03/25/20 1111      12.1 g/dL 03/03/20 1535      13.5 g/dL 01/09/20 0915    Hct 33.6 % 08/05/20 1826      31.9 % 04/07/20 0454      34.1 % 03/25/20 1111      34.5 % 03/03/20 1535      37.7 % 01/09/20 0915    ABO O  03/03/20 1535    Rh Positive  03/03/20 1535    Antibody Screen Negative  04/06/20 0459      Negative  01/09/20 0915    Glucose Fasting GTT       Glucose Tolerance Test 1 hour       Glucose Tolerance Test 3 hour       Gonorrhea (discrete) Negative  01/09/20 0944    Chlamydia (discrete) Negative  01/09/20 0944    RPR Non-Reactive  01/09/20 0915    VDRL        Syphilis Antibody       Rubella Positive  20    HBsAg Non-Reactive  20    Herpes Simplex Virus PCR       Herpes Simplex VIrus Culture       HIV Non-Reactive  20    Hep C RNA Quant PCR       Hep C Antibody Non-Reactive  20    AFP       Group B Strep Positive  20 1134    GBS Susceptibility to Clindamycin       GBS Susceptibility to Erythromycin       Fetal Fibronectin       Genetic Testing, Maternal Blood             Drug Screening     Test Value Date Time    Urine Drug Screen       Amphetamine Screen Negative  20    Barbiturate Screen Negative  20    Benzodiazepine Screen Negative  20    Methadone Screen Negative  20    Phencyclidine Screen Negative  20    Opiates Screen Negative  20    THC Screen Positive  20    Cocaine Screen       Propoxyphene Screen Negative  20    Buprenorphine Screen Negative  20    Methamphetamine Screen       Oxycodone Screen Negative  20    Tricyclic Antidepressants Screen Negative  20                 Past OB History:     OB History    Para Term  AB Living   1 0 0 0 0 0   SAB TAB Ectopic Molar Multiple Live Births   0 0 0 0 0 0      # Outcome Date GA Lbr Eliu/2nd Weight Sex Delivery Anes PTL Lv   1 Current                 ALLERGIES:   No Known Allergies     Home Medications:     Prior to Admission medications    Medication Sig Start Date End Date Taking? Authorizing Provider   Prenatal Vit-Fe Fumarate-FA (PRENATAL VITAMIN 27-0.8) 27-0.8 MG tablet tablet Take 1 tablet by mouth Daily.   Yes Yamileth Gallo MD   docusate sodium (COLACE) 100 MG capsule Take 100 mg by mouth Daily. 20  ProviderYamileth MD   fluconazole (Diflucan) 150 MG tablet Take 1 tablet by mouth Every 3 (Three) Days. Take 1 dose now and then again in 3 days. 20  Kina Reyez MD    ondansetron (Zofran) 4 MG tablet Take 1 tablet by mouth Every 8 (Eight) Hours As Needed for Nausea or Vomiting. 7/25/20 8/12/20  Kina Reyez MD       Past Medical History: Past Medical History:   Diagnosis Date   • Acne vulgaris    • Allergic rhinitis    • Anxiety    • Asthma    • Attention deficit hyperactivity disorder    • Chlamydia    • Conductive hearing loss     bilateral     • Depression    • Dysfunction of eustachian tube    • Elbow fracture, left    • Encounter for routine gynecological examination    • Epistaxis    • Gonorrhea    • Hand pain     right contusion      • Headache    • Heart murmur    • Herpetic maría    • High risk sexual behavior    • History of respiratory therapy 09/21/2011    Nebulizer Treatment 27027 (1) - DANI Johnson   • Insulin resistance 1/3/2017   • Irregular periods    • Laceration of foot    • Malaise and fatigue    • Mallet finger    • Myopia    • Nausea and vomiting    • Otalgia    • Otitis media    • Pain in wrist    • Pediculosis capitis    • Polycystic ovary syndrome    • PONV (postoperative nausea and vomiting)    • Scoliosis    • Sprain of foot, left    • Syncope, near    • Upper respiratory infection    • Verruca plantaris     right great toe         Past Surgical History Past Surgical History:   Procedure Laterality Date   • ADENOIDECTOMY  04/01/2004   • CAUTERIZATION NASAL BLEEDERS  04/01/2004    CAUTERIZATION INNER NOSE 69429 (1)   • OTHER SURGICAL HISTORY  04/01/2004    INCISION OF EARDRUM GENERAL ANESTHETIC 74289 (3) - BILATERAL TUBE IMPLANTS   • OVARIAN CYST SURGERY Left     pt states at 5 months pregnant cyst, left ovary and left fallopian tube were removed surgically.    • TONSILLECTOMY  2001   • WISDOM TOOTH EXTRACTION        Family History: Family History   Problem Relation Age of Onset   • Hypertension Father    • Supraventricular tachycardia Father    • No Known Problems Brother    • No Known Problems Sister    • Diabetes Paternal Grandfather     • Fibromyalgia Paternal Grandmother    • Breast cancer Maternal Grandmother    • Diabetes Maternal Grandfather    • Prostate cancer Maternal Grandfather    • Stomach cancer Maternal Grandfather       Social History:  reports that she has been smoking cigarettes. She has been smoking about 0.25 packs per day. She has never used smokeless tobacco.   reports that she drank alcohol.   reports that she does not use drugs.        Review of Systems                                                                                                                  Neuro no history of brain tumor    HENT no history of ear tumors    Eye no history of retinal tumors    Pulmonary no history of lung tumors    Cardiac no history of cardiac tumors    GI: No history of small bowel tumors    Musculoskeletal: No history of skeletal muscle tumors    Endocrine: No history of adrenal tumors    Lymphatic: No history of Hodgkin's disease    Renal: No history of renal cancer      Objective     Documented Vitals    08/12/20 1157   BP: 132/74   Weight: 134 kg (294 lb 6.4 oz)          OBGyn Exam  Constitutional: Appears to be in no acute distress; Eyes: sclera normal; Endocrine system: thyroid palpate is normal; Pulmonary system: lungs clear; Cardiovascular system: heart regular rate and rhythm; Gastrointestinal system: abdomen soft nontender, active bowel sounds; Urologic system: CVA negative; Psychiatric: appropriate insight; Neurologic: gait within normal limits cervix is 1-2, 50% -1-2      Last Labs  Lab Results   Component Value Date    WBC 11.68 (H) 08/05/2020    RBC 3.97 08/05/2020    HGB 11.4 (L) 08/05/2020    HCT 33.6 (L) 08/05/2020    MCV 84.6 08/05/2020    MCH 28.7 08/05/2020    MCHC 33.9 08/05/2020    RDW 14.7 08/05/2020    RDWSD 45.2 08/05/2020    MPV 9.4 08/05/2020     08/05/2020        Lab Results   Component Value Date    GLUCOSE 124 (H) 08/05/2020    BUN 8 08/05/2020    CREATININE 0.43 (L) 08/05/2020     (L)  2020    K 3.8 2020     2020    CO2 17.0 (L) 2020    CALCIUM 8.9 2020    PROTEINTOT 6.6 2020    ALBUMIN 3.50 2020    ALT 13 2020    AST 12 2020    ALKPHOS 143 (H) 2020    BILITOT <0.2 2020    EGFRIFNONA >150 2020    GLOB 3.1 2020    AGRATIO 1.1 2020    BCR 18.6 2020    ANIONGAP 11.0 2020       No results found for: HCGQUAL      Assessment/Plan:  1. 24 y.o. [id number].  Polyhydramnios possible developing macrosomia with estimated fetal weight at 39 weeks of about 4000 g after reviewing the risk benefits alternatives including the risk of shoulder dystocia and its implication were gone a plan to admit at 38-6/7 for cervical preparation    Dilia Olivas and I have discussed pain goals for this hospitalization after reviewing her current clinical condition, medical history and prior pain experiences.  The goal is to keep her pain level 3.  To help achieve this, I plan to use modal pain management.       This document has been electronically signed by Kale Avila MD on 2020 22:09\    Please note that portions of this note were completed with a voice recognition program.           Electronically signed by Kale Avila MD at 20 2215             Kale Avila MD at 20 1300            Obstetric History and Physical    Chief complaint: Polyhydramnios on biophysical profile today; suspected developing macrosomia.        Patient is a 24 y.o. female  currently at 38w5d, who presents with patient presents requesting induction of labor.  On ultrasound today there had been a pocket that was greater than 8 cm per  group protocol this qualifies for polyhydramnios.  She is approaching 39 weeks on 2020.  After discussing options of delivery at 39 weeks versus following for onset of labor she wants induction of labor at 39 weeks.  I discussed the advantages of waiting spontaneous  onset of labor with polyhydramnios particularly since this is borderline.  I did discuss the increased risk of stillbirth with polyhydramnios.  Also there has been by ultrasound concerned about excess fetal weight.  Projected fetal weight at 39 weeks right at 4000 g diabetic testing has been negative the risk with induction of labor of shoulder dystocia is reviewed at length.  Risk of having hypertonic contractions damage to mother baby reviewed.  Risk of iatrogenic  section and its risk reviewed. Both the short-term and long-term risks of  section are reviewed at length.  Short-term risks of bleeding, infection, problems with wound healing, damage to bowel, bladder or ureter.  Small, but real risk of maternal mortality is reviewed and understood.    Long-term risks include in future pregnancies accreta abruption, uterine rupture and stillbirth, among others might be ameliorated by a .  The patient and her family have been given opportunity to ask questions and these questions have been answered to their satisfaction.    Her prenatal care is has been through [facility].  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 Bauman in [place]     Obstetric History   #: 1, Date: None, Sex: None, Weight: None, GA: None, Delivery: None, Apgar1: None, Apgar5: None, Living: None, Birth Comments: None       The following portions of the patients history were reviewed and updated as appropriate: current medications, allergies, past medical history, past surgical history, past family history, past social history and problem list .       Prenatal Information:  Prenatal Results     POC Urine Glucose/Protein     Test Value Reference Range Date Time    Urine Glucose        Urine Protein              Initial Prenatal Labs     Test Value Reference Range Date Time    Hemoglobin 10.3  g/dL 12.0 - 16.0 04/07/20 0454      11.5 g/dL 12.0 - 16.0 03/25/20 1111      12.1 g/dL 12.0 - 15.9 03/03/20 1535      13.5 g/dL 12.0 - 15.9 01/09/20 0915    Hematocrit 31.9 % 36.0 - 46.0 04/07/20 0454      34.1 % 36.0 - 46.0 03/25/20 1111      34.5 % 34.0 - 46.6 03/03/20 1535      37.7 % 34.0 - 46.6 01/09/20 0915    Platelets 289 10*3/mm3 140 - 450 08/05/20 1826      237 10*3/uL 140 - 440 04/07/20 0454      252 10*3/uL 140 - 440 03/25/20 1111      269 10*3/mm3 140 - 450 03/03/20 1535      276 10*3/mm3 140 - 450 01/09/20 0915    Rubella IgG Positive   01/09/20 0915    Hepatitis B SAg Non-Reactive  Non-Reactive 01/09/20 0915    Hepatitis C Ab Non-Reactive  Non-Reactive 01/09/20 0915    RPR Non-Reactive  Non-Reactive 01/09/20 0915    ABO O   03/03/20 1535    Rh Positive   03/03/20 1535    Antibody Screen Negative   04/06/20 0459      Negative   01/09/20 0915    HIV Non-Reactive  Non-Reactive 01/09/20 0915    Urine Culture <10,000 CFU/mL Mixed Елена Isolated   01/09/20 0915    Gonorrhea Negative  Negative 01/09/20 0944    Chlamydia Negative  Negative 01/09/20 0944    TSH 1.22 uIU/ml 0.46 - 4.68 12/29/16 1140          2nd and 3rd Trimester     Test Value Reference Range Date Time    Hemoglobin (repeated) 11.4 g/dL 12.0 - 15.9 08/05/20 1826    Hematocrit (repeated) 33.6 % 34.0 - 46.6 08/05/20 1826    GCT 90 mg/dL 60 - 140 06/15/20 0930      110 mg/dL 60 - 140 01/16/20 0918    Antibody Screen (repeated)        GTT Fasting        GTT 1 Hr        GTT 2 Hr        GTT 3 Hr        Group B Strep Positive  Negative 07/20/20 1134          Drug Screening     Test Value Reference Range Date Time    Amphetamine Screen Negative  Negative 01/09/20 0915    Barbiturate Screen Negative  Negative 01/09/20 0915    Benzodiazepine Screen Negative  Negative 01/09/20 0915    Methadone Screen Negative  Negative 01/09/20 0915    Phencyclidine Screen Negative  Negative 01/09/20 0915    Opiates Screen Negative  Negative 01/09/20 0915    THC Screen  Positive  Negative 01/09/20 0915    Cocaine Screen Negative  Negative 01/09/20 0915    Propoxyphene Screen Negative  Negative 01/09/20 0915    Buprenorphine Screen Negative  Negative 01/09/20 0915    Methamphetamine Screen Negative  Negative 01/09/20 0915    Oxycodone Screen Negative  Negative 01/09/20 0915    Tricyclic Antidepressants Screen Negative  Negative 01/09/20 0915          Other (Risk screening)     Test Value Reference Range Date Time    Varicella IgG        Parvovirus IgG        CMV IgG        Cystic Fibrosis        Hemoglobin electrophoresis        NIPT        MSAFP-4        AFP (for NTD only)                  External Prenatal Results     Pregnancy Outside Results - Transcribed From Office Records - See Scanned Records For Details     Test Value Date Time    Hgb 11.4 g/dL 08/05/20 1826      10.3 g/dL 04/07/20 0454      11.5 g/dL 03/25/20 1111      12.1 g/dL 03/03/20 1535      13.5 g/dL 01/09/20 0915    Hct 33.6 % 08/05/20 1826      31.9 % 04/07/20 0454      34.1 % 03/25/20 1111      34.5 % 03/03/20 1535      37.7 % 01/09/20 0915    ABO O  03/03/20 1535    Rh Positive  03/03/20 1535    Antibody Screen Negative  04/06/20 0459      Negative  01/09/20 0915    Glucose Fasting GTT       Glucose Tolerance Test 1 hour       Glucose Tolerance Test 3 hour       Gonorrhea (discrete) Negative  01/09/20 0944    Chlamydia (discrete) Negative  01/09/20 0944    RPR Non-Reactive  01/09/20 0915    VDRL       Syphilis Antibody       Rubella Positive  01/09/20 0915    HBsAg Non-Reactive  01/09/20 0915    Herpes Simplex Virus PCR       Herpes Simplex VIrus Culture       HIV Non-Reactive  01/09/20 0915    Hep C RNA Quant PCR       Hep C Antibody Non-Reactive  01/09/20 0915    AFP       Group B Strep Positive  07/20/20 1134    GBS Susceptibility to Clindamycin       GBS Susceptibility to Erythromycin       Fetal Fibronectin       Genetic Testing, Maternal Blood             Drug Screening     Test Value Date Time    Urine  Drug Screen       Amphetamine Screen Negative  20    Barbiturate Screen Negative  20    Benzodiazepine Screen Negative  20    Methadone Screen Negative  20    Phencyclidine Screen Negative  20    Opiates Screen Negative  20    THC Screen Positive  20    Cocaine Screen       Propoxyphene Screen Negative  20    Buprenorphine Screen Negative  20    Methamphetamine Screen       Oxycodone Screen Negative  20    Tricyclic Antidepressants Screen Negative  20                 Past OB History:     OB History    Para Term  AB Living   1 0 0 0 0 0   SAB TAB Ectopic Molar Multiple Live Births   0 0 0 0 0 0      # Outcome Date GA Lbr Eliu/2nd Weight Sex Delivery Anes PTL Lv   1 Current                 ALLERGIES:   No Known Allergies     Home Medications:     Prior to Admission medications    Medication Sig Start Date End Date Taking? Authorizing Provider   Prenatal Vit-Fe Fumarate-FA (PRENATAL VITAMIN 27-0.8) 27-0.8 MG tablet tablet Take 1 tablet by mouth Daily.   Yes Yamileth Gallo MD   docusate sodium (COLACE) 100 MG capsule Take 100 mg by mouth Daily. 20  Yamileth Gallo MD   fluconazole (Diflucan) 150 MG tablet Take 1 tablet by mouth Every 3 (Three) Days. Take 1 dose now and then again in 3 days. 20  Kina Reyez MD   ondansetron (Zofran) 4 MG tablet Take 1 tablet by mouth Every 8 (Eight) Hours As Needed for Nausea or Vomiting. 20  Kina Reyez MD       Past Medical History: Past Medical History:   Diagnosis Date   • Acne vulgaris    • Allergic rhinitis    • Anxiety    • Asthma    • Attention deficit hyperactivity disorder    • Chlamydia    • Conductive hearing loss     bilateral     • Depression    • Dysfunction of eustachian tube    • Elbow fracture, left    • Encounter for routine gynecological examination     • Epistaxis    • Gonorrhea    • Hand pain     right contusion      • Headache    • Heart murmur    • Herpetic maría    • High risk sexual behavior    • History of respiratory therapy 09/21/2011    Nebulizer Treatment 44996 (1) - DANI Johnson   • Insulin resistance 1/3/2017   • Irregular periods    • Laceration of foot    • Malaise and fatigue    • Mallet finger    • Myopia    • Nausea and vomiting    • Otalgia    • Otitis media    • Pain in wrist    • Pediculosis capitis    • Polycystic ovary syndrome    • PONV (postoperative nausea and vomiting)    • Scoliosis    • Sprain of foot, left    • Syncope, near    • Upper respiratory infection    • Verruca plantaris     right great toe         Past Surgical History Past Surgical History:   Procedure Laterality Date   • ADENOIDECTOMY  04/01/2004   • CAUTERIZATION NASAL BLEEDERS  04/01/2004    CAUTERIZATION INNER NOSE 93489 (1)   • OTHER SURGICAL HISTORY  04/01/2004    INCISION OF EARDRUM GENERAL ANESTHETIC 09560 (3) - BILATERAL TUBE IMPLANTS   • OVARIAN CYST SURGERY Left     pt states at 5 months pregnant cyst, left ovary and left fallopian tube were removed surgically.    • TONSILLECTOMY  2001   • WISDOM TOOTH EXTRACTION        Family History: Family History   Problem Relation Age of Onset   • Hypertension Father    • Supraventricular tachycardia Father    • No Known Problems Brother    • No Known Problems Sister    • Diabetes Paternal Grandfather    • Fibromyalgia Paternal Grandmother    • Breast cancer Maternal Grandmother    • Diabetes Maternal Grandfather    • Prostate cancer Maternal Grandfather    • Stomach cancer Maternal Grandfather       Social History:  reports that she has been smoking cigarettes. She has been smoking about 0.25 packs per day. She has never used smokeless tobacco.   reports that she drank alcohol.   reports that she does not use drugs.        Review of Systems                                                                                                                   Neuro no history of brain tumor    HENT no history of ear tumors    Eye no history of retinal tumors    Pulmonary no history of lung tumors    Cardiac no history of cardiac tumors    GI: No history of small bowel tumors    Musculoskeletal: No history of skeletal muscle tumors    Endocrine: No history of adrenal tumors    Lymphatic: No history of Hodgkin's disease    Renal: No history of renal cancer      Objective     Documented Vitals    20 1157   BP: 132/74   Weight: 134 kg (294 lb 6.4 oz)          OBGyn Exam  Constitutional: Appears to be in no acute distress; Eyes: sclera normal; Endocrine system: thyroid palpate is normal; Pulmonary system: lungs clear; Cardiovascular system: heart regular rate and rhythm; Gastrointestinal system: abdomen soft nontender, active bowel sounds; Urologic system: CVA negative; Psychiatric: appropriate insight; Neurologic: gait within normal limits cervix is 1-2, 50% -1-2      Last Labs  Lab Results   Component Value Date    WBC 11.68 (H) 2020    RBC 3.97 2020    HGB 11.4 (L) 2020    HCT 33.6 (L) 2020    MCV 84.6 2020    MCH 28.7 2020    MCHC 33.9 2020    RDW 14.7 2020    RDWSD 45.2 2020    MPV 9.4 2020     2020        Lab Results   Component Value Date    GLUCOSE 124 (H) 2020    BUN 8 2020    CREATININE 0.43 (L) 2020     (L) 2020    K 3.8 2020     2020    CO2 17.0 (L) 2020    CALCIUM 8.9 2020    PROTEINTOT 6.6 2020    ALBUMIN 3.50 2020    ALT 13 2020    AST 12 2020    ALKPHOS 143 (H) 2020    BILITOT <0.2 2020    EGFRIFNONA >150 2020    GLOB 3.1 2020    AGRATIO 1.1 2020    BCR 18.6 2020    ANIONGAP 11.0 2020       No results found for: HCGQUAL      Assessment/Plan:  2. 24 y.o. [id number].  Polyhydramnios possible developing macrosomia with estimated fetal  weight at 39 weeks of about 4000 g after reviewing the risk benefits alternatives including the risk of shoulder dystocia and its implication were gone a plan to admit at 38-6/7 for cervical preparation    Dilia Olivas and I have discussed pain goals for this hospitalization after reviewing her current clinical condition, medical history and prior pain experiences.  The goal is to keep her pain level 3.  To help achieve this, I plan to use modal pain management.       This document has been electronically signed by Kale Avila MD on August 12, 2020 22:09\    Please note that portions of this note were completed with a voice recognition program.           Electronically signed by Kale Avila MD at 08/12/20 3475       Emergency Department Notes    No notes of this type exist for this encounter.           Facility-Administered Medications as of 8/14/2020   Medication Dose Route Frequency Provider Last Rate Last Dose   • bupivacaine 0.125% in 100 ml 0.9% Sodium Chloride epidural   Epidural Continuous Nabor Kaye CRNA       • butorphanol (STADOL) injection 1 mg  1 mg Intravenous Q3H PRN Kale Avila MD       • butorphanol (STADOL) injection 2 mg  2 mg Intravenous Q3H PRN Kale Avila MD   2 mg at 08/13/20 1934   • carboprost (HEMABATE) injection 250 mcg  250 mcg Intramuscular PRN Kale Avila MD       • dextrose 5 % and lactated Ringer's infusion  125 mL/hr Intravenous Continuous Kale Avila  mL/hr at 08/14/20 0042 125 mL/hr at 08/14/20 0042   • diphenhydrAMINE (BENADRYL) injection 12.5 mg  12.5 mg Intravenous Q8H PRN Nabor Kaye CRNA       • ePHEDrine injection 5 mg  5 mg Intravenous Q10 Min PRN Nabor Kaye CRNA       • famotidine (PEPCID) injection 20 mg  20 mg Intravenous Once PRN Nabor Kaye CRNA       • lactated ringers bolus 1,000 mL  1,000 mL Intravenous Once PRN Kale Avila MD       • lidocaine PF 1% (XYLOCAINE) injection 5 mL  5 mL Intradermal PRN  Kale Avila MD       • methylergonovine (METHERGINE) injection 200 mcg  200 mcg Intramuscular Once PRN Kale Avila MD       • [COMPLETED] miSOPROStol (CYTOTEC) half tablet 50 mcg  50 mcg Sublingual Once Kale Avila MD   50 mcg at 08/13/20 1243   • [COMPLETED] miSOPROStol (CYTOTEC) split tablet 25 mcg  25 mcg Vaginal Once Kale Avila MD   25 mcg at 08/13/20 0645   • miSOPROStol (CYTOTEC) tablet 400 mcg  400 mcg Sublingual PRN Kale Avila MD   400 mcg at 08/14/20 0810   • ondansetron (ZOFRAN) injection 4 mg  4 mg Intravenous Once PRN Nabor Kaye CRNA       • oxytocin (PITOCIN) 30 units in 0.9% sodium chloride 500 mL (premix)  2-20 cynthia-units/min Intravenous Titrated Kale Avila  mL/hr at 08/14/20 0807 650 mL/hr at 08/14/20 0807   • [COMPLETED] penicillin G potassium 5 Million Units in sodium chloride 0.9 % 100 mL IVPB  5 Million Units Intravenous Once Kale Avila MD   5 Million Units at 08/13/20 0532    Followed by   • penicillin G potassium 3 Million Units in sodium chloride 0.9 % 100 mL IVPB  3 Million Units Intravenous Q4H Kale Avila MD   3 Million Units at 08/14/20 0529   • promethazine (PHENERGAN) injection 12.5 mg  12.5 mg Intravenous Q2H PRN Amado Brooks MD   12.5 mg at 08/13/20 1934    Or   • promethazine (PHENERGAN) injection 6.25 mg  6.25 mg Intramuscular Q2H PRN Amado Brooks MD        Or   • promethazine (PHENERGAN) suppository 12.5 mg  12.5 mg Rectal Q6H PRN Amado Brooks MD        Or   • promethazine (PHENERGAN) tablet 12.5 mg  12.5 mg Oral Q6H PRN Amado Brooks MD       • sodium chloride 0.9 % flush 3 mL  3 mL Intravenous Q12H Kale Avila MD       • sodium chloride 0.9 % flush 3-10 mL  3-10 mL Intravenous PRN Kale Avila MD         Lab Results (last 48 hours)     Procedure Component Value Units Date/Time    Urinalysis With Microscopic If Indicated (No Culture) - Urine, Clean Catch [[id number]]  (Abnormal) Collected:  08/14/20  0530    Specimen:  Urine, Clean Catch Updated:  08/14/20 0538     Color, UA Yellow     Appearance, UA Clear     pH, UA 7.5     Specific [place], UA 1.005     Comment: Result obtained by Refractometer        Glucose, UA Negative     Ketones, UA Negative     Bilirubin, UA Negative     Blood, UA Trace     Protein, UA Negative     Leuk Esterase, UA Negative     Nitrite, UA Negative     Urobilinogen, UA 0.2 E.U./dL    Urinalysis, Microscopic Only - Urine, Clean Catch [[id number]]  (Abnormal) Collected:  08/14/20 0530    Specimen:  Urine, Clean Catch Updated:  08/14/20 0538     RBC, UA 3-5 /HPF      WBC, UA 0-2 /HPF      Bacteria, UA None Seen /HPF      Squamous Epithelial Cells, UA None Seen /HPF      Hyaline Casts, UA 0-2 /LPF      Methodology Automated Microscopy    Protein / Creatinine Ratio, Urine - Urine, Clean Catch [[id number]] Collected:  08/14/20 0530    Specimen:  Urine, Clean Catch Updated:  08/14/20 0530    Extra Tubes [[id number]] Collected:  08/13/20 0527    Specimen:  Blood, Venous Line Updated:  08/13/20 0630    Narrative:       The following orders were created for panel order Extra Tubes.  Procedure                               Abnormality         Status                     ---------                               -----------         ------                     Gold Top - SST[[id number]]                                   Final result                 Please view results for these tests on the individual orders.    Gold Top - SST [[id number]] Collected:  08/13/20 0527    Specimen:  Blood Updated:  08/13/20 0630     Extra Tube Hold for add-ons.     Comment: Auto resulted.       Urine Drug Screen - [[id number]]  (Normal) Collected:  08/13/20 0523    Specimen:  Urine Updated:  08/13/20 0541     THC, Screen, Urine Negative     Phencyclidine (PCP), Urine Negative     Cocaine Screen, Urine Negative     Methamphetamine, Ur Negative     Opiate Screen Negative     Amphetamine Screen, Urine Negative     Benzodiazepine  Screen, Urine Negative     Tricyclic Antidepressants Screen Negative     Methadone Screen, Urine Negative     Barbiturates Screen, Urine Negative     Oxycodone Screen, Urine Negative     Propoxyphene Screen Negative     Buprenorphine, Screen, Urine Negative    Narrative:       Cutoff For Drugs Screened:    Amphetamines               500 ng/ml  Barbiturates               200 ng/ml  Benzodiazepines            150 ng/ml  Cocaine                    150 ng/ml  Methadone                  200 ng/ml  Opiates                    100 ng/ml  Phencyclidine               25 ng/ml  THC                            50 ng/ml  Methamphetamine            500 ng/ml  Tricyclic Antidepressants  300 ng/ml  Oxycodone                  100 ng/ml  Propoxyphene               300 ng/ml  Buprenorphine               10 ng/ml    The normal value for all drugs tested is negative. This report includes unconfirmed screening results, with the cutoff values listed, to be used for medical treatment purposes only.  Unconfirmed results must not be used for non-medical purposes such as employment or legal testing.  Clinical consideration should be applied to any drug of abuse test, particularly when unconfirmed results are used.      CBC (No Diff) [[id number]]  (Abnormal) Collected:  08/13/20 0523    Specimen:  Blood Updated:  08/13/20 0530     WBC 13.07 10*3/mm3      RBC 4.06 10*6/mm3      Hemoglobin 11.4 g/dL      Hematocrit 33.9 %      MCV 83.5 fL      MCH 28.1 pg      MCHC 33.6 g/dL      RDW 14.8 %      RDW-SD 45.5 fl      MPV 9.3 fL      Platelets 291 10*3/mm3           Imaging Results (Last 48 Hours)     ** No results found for the last 48 hours. **        ECG/EMG Results (last 48 hours)     ** No results found for the last 48 hours. **           Operative/Procedure Notes (last 48 hours) (Notes from 08/12/20 1122 through 08/14/20 1122)      Kale Avila MD at 08/14/20 0837          [facility]  Vaginal Delivery Note    Delivery     Delivery:  Vaginal, Spontaneous     YOB: 2018    Time of Birth:  Gestational Age 7:54 AM   39w0d     Anesthesia: Epidural     Delivering clinician: Kale Avila        Forceps?   No   Vacuum? No    Shoulder dystocia present: No        Delivery narrative: On 2020 Dilia Olivas at , delivered a viable male infant, Nick Ortiz, to a sterile field with epidural  anesthesia. Shoulders delivered without difficulty. Body was delivered with gentle traction, nuchal and body cord reduced, infant placed on mother's abdomen, WPDS (warmed, positioned, dried, stimulated), bulb suctioned. Cord clamped and cut after 1 minute of delayed clamping, by father at maternal request.  Cord blood collected. Placenta with 3 vessel cord delivered spontaneous appears intact. 20 units of Pitocin given. Infant delivered over intact perineum. Vulvar abrasion required two stitches.   EBL    200 mL per nursing first measurement. Infants weight 7 lb 11.8 oz (3510 g) . Apgars were 8   and 9   at one and five minutes, respectively. Infant and mother to recover in room.       Infant    Findings: male  infant     Infant observations: Weight: 3510 g (7 lb 11.8 oz)   Length: 20.5  in  Observations/Comments:         Apgars: 8   @ 1 minute /    9   @ 5 minutes   Infant Name: Nick Ortiz      Placenta, Cord, and Fluid    Placenta delivered  Spontaneous  at   2020  8:06 AM     Cord: 3 vessels  present.   Nuchal Cord?  yes; Number of nuchal loops present:       Cord blood obtained: Yes    Cord gases obtained:  No    Cord gas results: Venous:  No results found for: PHCVEN    Arterial:  No results found for: PHCART     Repair    Episiotomy: No      Lacerations: No   Estimated Blood Loss:  200 mL per nursing first count           Complications  Polyhydramnios     Disposition  Mother to Mother Baby/Postpartum  in stable condition currently.  Baby to remains with mom  in stable condition currently.      This document has been  electronically signed by Amado Brooks MD on August 14, 2020 08:49   I have seen and evaluated the patient.  I have discussed the case with the resident. I have reviewed the notes, assessment and plan, and/or procedures performed by the resident. I concur with the resident’s documentation.        This document has been electronically signed by Kale Avila MD on August 14, 2020 10:29        Electronically signed by Kael Avila MD at 08/14/20 1029          Physician Progress Notes (last 48 hours) (Notes from 08/12/20 1122 through 08/14/20 1122)      Kale Avila MD at 08/14/20 0704        Patient 6 to 7 cm.  Contractions palpating 3/4 fetal heart rate strip reviewed with staff    Electronically signed by Kale Avila MD at 08/14/20 0706     Kale Avila MD at 08/13/20 2305        Patient is 5 cm.    She requested epidural had some hypotension with secondary late decelerations after this has responded to therapy any the fetal heart rate strip looks better now with moderate variability and no.  Periodic changes.    Difficulty dosing oxytocin secondary to patient habitus.  I again tried to place an IUPC but was unsuccessful in getting in satisfactory position I discussed situation with nursing staff will have to use palpation to dose..  Oxytocin is off now we will leave off for 30 minutes to an hour and then will restart at 2.    Clinical situation reviewed at length with patient and family questions answered    Patient is having some mild blood pressure readings before epidural no headaches visual changes epigastric pain DTRs 2+/4 I think these were related to pain but I am going to obtain CBC CMP a UA and have a urine protein creatinine ratio sent out     Electronically signed by Kale Avila MD at 08/13/20 2308     Kale Avila MD at 08/13/20 1723        Patient with spontaneous rupture of membranes now 3 to 4 cm internal electrode placed because of difficulty monitoring.  Attempted to  place IUPC but could not get in satisfactory position x2.  Clinical situation reviewed at length patient and  questions answered    Electronically signed by Kale Avila MD at 08/13/20 1724       Consult Notes (last 48 hours) (Notes from 08/12/20 1122 through 08/14/20 1122)    No notes of this type exist for this encounter.

## 2020-08-14 NOTE — PLAN OF CARE
Problem: Patient Care Overview  Goal: Plan of Care Review  Outcome: Ongoing (interventions implemented as appropriate)  Flowsheets (Taken 8/14/2020 7824)  Progress: improving  Plan of Care Reviewed With: patient  Outcome Summary: vss; ambulates in room; voids; no c/o pain

## 2020-08-15 LAB
BASOPHILS # BLD AUTO: 0.07 10*3/MM3 (ref 0–0.2)
BASOPHILS NFR BLD AUTO: 0.5 % (ref 0–1.5)
DEPRECATED RDW RBC AUTO: 47.4 FL (ref 37–54)
EOSINOPHIL # BLD AUTO: 0.12 10*3/MM3 (ref 0–0.4)
EOSINOPHIL NFR BLD AUTO: 0.9 % (ref 0.3–6.2)
ERYTHROCYTE [DISTWIDTH] IN BLOOD BY AUTOMATED COUNT: 15.3 % (ref 12.3–15.4)
HCT VFR BLD AUTO: 31.9 % (ref 34–46.6)
HGB BLD-MCNC: 10.7 G/DL (ref 12–15.9)
IMM GRANULOCYTES # BLD AUTO: 0.19 10*3/MM3 (ref 0–0.05)
IMM GRANULOCYTES NFR BLD AUTO: 1.4 % (ref 0–0.5)
LYMPHOCYTES # BLD AUTO: 3.16 10*3/MM3 (ref 0.7–3.1)
LYMPHOCYTES NFR BLD AUTO: 22.7 % (ref 19.6–45.3)
MCH RBC QN AUTO: 28.8 PG (ref 26.6–33)
MCHC RBC AUTO-ENTMCNC: 33.5 G/DL (ref 31.5–35.7)
MCV RBC AUTO: 85.8 FL (ref 79–97)
MONOCYTES # BLD AUTO: 1.39 10*3/MM3 (ref 0.1–0.9)
MONOCYTES NFR BLD AUTO: 10 % (ref 5–12)
NEUTROPHILS NFR BLD AUTO: 64.5 % (ref 42.7–76)
NEUTROPHILS NFR BLD AUTO: 8.98 10*3/MM3 (ref 1.7–7)
NRBC BLD AUTO-RTO: 0 /100 WBC (ref 0–0.2)
PLATELET # BLD AUTO: 246 10*3/MM3 (ref 140–450)
PMV BLD AUTO: 9.5 FL (ref 6–12)
RBC # BLD AUTO: 3.72 10*6/MM3 (ref 3.77–5.28)
WBC # BLD AUTO: 13.91 10*3/MM3 (ref 3.4–10.8)

## 2020-08-15 PROCEDURE — 85025 COMPLETE CBC W/AUTO DIFF WBC: CPT | Performed by: STUDENT IN AN ORGANIZED HEALTH CARE EDUCATION/TRAINING PROGRAM

## 2020-08-15 PROCEDURE — 99232 SBSQ HOSP IP/OBS MODERATE 35: CPT | Performed by: OBSTETRICS & GYNECOLOGY

## 2020-08-15 RX ORDER — IBUPROFEN 800 MG/1
800 TABLET ORAL EVERY 8 HOURS PRN
Qty: 30 TABLET | Refills: 0 | Status: SHIPPED | OUTPATIENT
Start: 2020-08-15 | End: 2020-10-09

## 2020-08-15 RX ORDER — ACETAMINOPHEN 500 MG
1000 TABLET ORAL EVERY 6 HOURS PRN
Qty: 30 TABLET | Refills: 0 | Status: SHIPPED | OUTPATIENT
Start: 2020-08-15 | End: 2020-10-09

## 2020-08-15 RX ORDER — PSEUDOEPHEDRINE HCL 30 MG
100 TABLET ORAL 2 TIMES DAILY
Qty: 60 EACH | Refills: 0 | Status: SHIPPED | OUTPATIENT
Start: 2020-08-15 | End: 2020-10-09

## 2020-08-15 RX ADMIN — DOCUSATE SODIUM 100 MG: 100 CAPSULE, LIQUID FILLED ORAL at 20:18

## 2020-08-15 NOTE — DISCHARGE INSTR - APPOINTMENTS
Follow Up visit with Dr. Avila in 2 weeks. Office will call with date/time of appointment. [phone]

## 2020-08-15 NOTE — PLAN OF CARE
Problem: Patient Care Overview  Goal: Plan of Care Review  Outcome: Ongoing (interventions implemented as appropriate)  Flowsheets  Taken 8/15/2020 0549 by Shayy Gama, RN  Progress: improving  Plan of Care Reviewed With: patient  Taken 8/14/2020 3404 by Myranda Decker, RN  Outcome Summary: vss; ambulates in room; voids; no c/o pain

## 2020-08-15 NOTE — LACTATION NOTE
This note was copied from a baby's chart.  Mother requests help with breastfeeding at this time.  Infant is sleeping and uninterested. Infant is stripped to diaper and placed in football position. Infant latches and sucks a couple of times but then sleeps. Mother states she would like to supplement at this time. Infant is given a bottle of formula and mother is educated on breast pumping. Mother is encouraged to continue pumping to get milk to come in and make a follow up appointment with lactation nurse with assistance on latch.

## 2020-08-15 NOTE — DISCHARGE SUMMARY
[facility] - Discharge Summary from Vaginal Delivery     Patient Name: Dilia Olivas  MRN: [id number]  : 1995  Admit Date: 2020  Discharge Date: 2020  Admitting Physician: Kale Avila MD  Discharge Physician: Kina Reyez MD     Admit/Discharge Diagnoses:  Active Hospital Problems    Diagnosis  POA   • **High-risk pregnancy in third trimester [O09.93]  Not Applicable   • Polyhydramnios [O40.9XX0]  Yes   • Excessive fetal growth affecting management of mother in third trimester, antepartum [O36.63X0]  Yes   • Mother positive for group B Streptococcus colonization [P00.2]  Not Applicable   • Insulin resistance complicating pregnancy [O26.899]  Yes   • Urinary tract infection in mother during third trimester of pregnancy [O23.43]  Yes   • Pelvic mass during pregnancy [O26.899, R19.00]  Yes   • Drug use affecting pregnancy in first trimester [O99.321]  Yes   • Tobacco smoking affecting pregnancy in third trimester [O99.333]  Yes      Resolved Hospital Problems   No resolved problems to display.        Hospital Course:  Ms. Olivas is a 25 year old  admitted on 2020 at 38 weeks 6 days. Her pregnancy was complicated by polyhydramnios. She was positive for group B strep and treated with penicillin G IV.     Ms. Olivas delivered vaginally at 39 weeks in a sterile field with epidural  anesthesia. Shoulders were delivered without difficulty. Body was delivered with gentle traction, nuchal and body cord reduced, infant placed on mother's abdomen, WPDS (warmed, positioned, dried, stimulated), bulb suctioned. Cord clamped and cut after 1 minute of delayed clamping, by father at maternal request.  Cord blood collected. Placenta with 3 vessel cord delivered spontaneous appears intact. 20 units of Pitocin given. Infant delivered over intact perineum. Vulvar abrasion required two stitches.  EBL  200 mL per nursing first measurement. Infant's weight 7 lb 11.8 oz (3510 g) .  Apgars were 8 and 9 at one and five minutes, respectively.    Ms. Olivas expressed desire for discharge on 8/15/2020.         Procedures Performed:   Vaginal, Spontaneous      History:     The patient had a Vaginal, Spontaneous  on 8/14/2020 . She delivered a Male  infant that weighed 7 lb 11.8 oz (3510 g) . Apgars were 8   and 9   at one and five minutes, respectively. Delivery complications included Nuchal;Wrapped . Lacerations: None .        Subjective:    Ms. Olivas continues to feel well and continues to express a desire for discharge. Her pain is well controlled with current medications.  She is breast-feeding her baby boy, Nick, who is in the room with her and is active with provider in room.  She has had some trouble with her milk coming in but states that she has an appointment with a lactation consultant and a  on her phone. Ms. Olivas has amulated in her room but denies leaving her room and states she did not know she was supposed to. I encouraged her to walk the hallway.  She continues to deny calf tenderness. She states that her lochia continues to improve.  She is tolerating a normal diet, has passed flatus, and had a bowel movement.  She had one episode of dizziness and nausea last night per nursing, but she states that it passed and she has had no recurrent symptoms.  She will discuss her plans for birth control at her follow-up appointment with Dr. Avila      Objective:  Vitals:    08/16/20 0610   BP: 105/53   Pulse: 75   Resp: 20   Temp: 98.2 °F (36.8 °C)   SpO2: 99%        General: Alert, well appearing, in no apparent distress  Uterine Fundus: Firm   Cardiovascular: Regular rate and rhythm  Lungs: Clear to auscultation bilaterally  Abdominal: Active bowel sounds  Extremities: No cords appreciated in calves bilaterally    Labs at Discharge:  Lab Results   Component Value Date    WBC 13.91 (H) 08/15/2020    HGB 10.7 (L) 08/15/2020    HCT 31.9 (L) 08/15/2020    MCV 85.8 08/15/2020      08/15/2020        Discharge diet:   Diet Instructions     Diet: Regular      Discharge Diet:  Regular        Activity at Discharge:  Activity Instructions     Activity as Tolerated      Bathing Restrictions      Avoid swimming and tub baths    Type of Restriction:  Bathing    Bathing Restrictions:  No Tub Bath    Driving Restrictions      No driving if requiring narcotics for pain at any time post-delivery. Avoid driving when overly fatigued.    Type of Restriction:  Driving    Driving Restrictions:  No Driving While Taking Narcotics    Lifting Restrictions      Do not lift anything heavier than baby in carseat    Type of Restriction:  Lifting    Lifting Restrictions:  Lifting Restriction (Indicate Limit)    Weight Limit (Pounds):  10    Length of Lifting Restriction:  Until followup appointment    Pelvic Rest      Sexual Activity Restrictions      Type of Restriction:  Sex    Explain Sexual Activity Restrictions:  Nothing inserted into vagina, including tampons and sex, for 6 weeks postpartum        Call MD if you experience heavy vaginal bleeding, frequent passage of clots, foul odor of lochia, increased pain, fever > 100.4F or any other concerns.    Discharge condition: Stable  Disposition: Home    Discharge Meds:     Your medication list      START taking these medications      Instructions Last Dose Given Next Dose Due   acetaminophen 500 MG tablet  Commonly known as:  TYLENOL      Take 2 tablets by mouth Every 6 (Six) Hours As Needed for Mild Pain .       docusate sodium 100 MG capsule      Take 100 mg by mouth 2 (Two) Times a Day.       ibuprofen 800 MG tablet  Commonly known as:  ADVIL,MOTRIN      Take 1 tablet by mouth Every 8 (Eight) Hours As Needed for Mild Pain .          CONTINUE taking these medications      Instructions Last Dose Given Next Dose Due   prenatal vitamin 27-0.8 27-0.8 MG tablet tablet      Take 1 tablet by mouth Daily.             Where to Get Your Medications      These medications were  sent to [facility]/pharm[facility] #[id number] - [place], [place] - [facility] - [phone]  - [phone] [address] [address]    Phone:  [phone]   · acetaminophen 500 MG tablet  · docusate sodium 100 MG capsule  · ibuprofen 800 MG tablet         Follow Up:  Dr. Kale Avila   Two Week Postpartum Visit  [facility] [id number]        Signature      This document has been electronically signed by Amado Brooks MD on August 16, 2020 09:37

## 2020-08-15 NOTE — NURSING NOTE
"Nurse call to room. Pt sitting up in chair pumping. Pt states,\" yeah, Im feeling funny and I feel clammy.\" pt denies any  Pain, shortness of breath, abd pain. Vs obtained and stable. Pt now says that she feels a little sick to her stomach. Dr Reyez notified of pt status. No new orders at this time other than to monitor closely.  "

## 2020-08-15 NOTE — PROGRESS NOTES
"[facility] - Vaginal Delivery Progress Note    Hospital Day: 2     Subjective:    Ms. Olivas is a 25 year old  followed 1 day after vaginal delivery at 39 weeks. Today, Ms. Olivas states \"I feel great.\"  Her pain is well controlled with current medications. Her baby boy, Vargas, is in the room with her.  Although she experienced some difficulty with breast-feeding yesterday, she states she is no longer having trouble breast-feeding and feeds much easier today.  She is ambulating well and denies calf tenderness. She states her lochia is improved \"very much so.\"  She has no plan for birth control but plans to discuss it at her follow-up appointment.  She is tolerating a normal diet and ate a chicken sandwich last night.  She has passed gas but has not yet had a bowel movement. She expresses a strong desire to go home today.    Meds reviewed  ROS reviewed and otherwise negative     Objective:  Temp:  [97.9 °F (36.6 °C)-98.9 °F (37.2 °C)] 98 °F (36.7 °C)  Heart Rate:  [] 80  Resp:  [18] 18  BP: (109-135)/(58-85) 114/58    General: alert, well appearing, in no apparent distress  Uterine Fundus: Firm  Abdomen: Active bowel sounds  Heart: Regular rate and rhythm   Lungs: Clear to auscultation bilaterally  Extremities: No cords appreciated in calves     Labs:  Lab Results   Component Value Date    WBC 13.91 (H) 08/15/2020    HGB 10.7 (L) 08/15/2020    HCT 31.9 (L) 08/15/2020    MCV 85.8 08/15/2020     08/15/2020    ABORH O Positive 2020    RH Positive 2020    HEPBSAG Non-Reactive 2020   ?     Assessment:  Active Hospital Problems    Diagnosis  POA   • **Polyhydramnios [O40.9XX0]  Yes   • Excessive fetal growth affecting management of mother in third trimester, antepartum [O36.63X0]  Yes   • Mother positive for group B Streptococcus colonization [P00.2]  Not Applicable   • Insulin resistance complicating pregnancy [O26.899]  Yes   • Urinary tract infection in mother " during third trimester of pregnancy [O23.43]  Yes   • Pelvic mass during pregnancy [O26.899, R19.00]  Yes   • High-risk pregnancy in third trimester [O09.93]  Not Applicable   • Drug use affecting pregnancy in first trimester [O99.321]  Yes   • Tobacco smoking affecting pregnancy in third trimester [O99.333]  Yes      Resolved Hospital Problems   No resolved problems to display.     Ms. Olivas is a 25 year old  followed 1 day after vaginal delivery at 39 weeks. She is doing well. She has had an elevated WBC count of 13.91 today and 13.07 two days ago but is afebrile and asymptomatic. Urinalysis showed only trace blood.        Plan:   Discharge if attending agrees.       Signature    This document has been electronically signed by Amado Brooks MD on August 15, 2020 08:23

## 2020-08-16 VITALS
OXYGEN SATURATION: 99 % | HEART RATE: 80 BPM | RESPIRATION RATE: 18 BRPM | SYSTOLIC BLOOD PRESSURE: 106 MMHG | WEIGHT: 293 LBS | BODY MASS INDEX: 44.41 KG/M2 | DIASTOLIC BLOOD PRESSURE: 50 MMHG | TEMPERATURE: 98.3 F | HEIGHT: 68 IN

## 2020-08-16 PROCEDURE — 99232 SBSQ HOSP IP/OBS MODERATE 35: CPT | Performed by: OBSTETRICS & GYNECOLOGY

## 2020-08-16 RX ADMIN — DOCUSATE SODIUM 100 MG: 100 CAPSULE, LIQUID FILLED ORAL at 08:20

## 2020-08-16 NOTE — PROGRESS NOTES
[facility] - Vaginal Delivery Progress Note    Hospital Day: 3    Subjective:     Ms. Olivas is a 25 year old  followed 2 days after vaginal delivery at 39 weeks. Discharge orders were placed yesterday, however she is still here this morning. Nurses state that Ms. Olivas's baby appeared jaundiced today.    Ms. Olivas continues to feel well and continues to express a desire for discharge. Her pain is well controlled with current medications.  She is breast-feeding her baby boy, Nick, who is in the room with her and is active with provider in room.  She has had some trouble with her milk coming in but states that she has an appointment with a lactation consultant and a  on her phone. Ms. Olivas has ambulated in her room but has not yet left her room and states that she did not know she was supposed to. I encouraged her to walk the hallway.  She continues to deny calf tenderness. She states that her lochia continues to improve.  She is tolerating a normal diet, has passed flatus, and had a bowel movement.  She had one episode of dizziness and nausea last night per nursing, but she states that it passed and she has had no recurrent symptoms.  She will discuss her plans for birth control at her follow-up appointment with Dr. Avila.     Meds reviewed  ROS reviewed and otherwise negative     Objective:  Temp:  [98 °F (36.7 °C)-98.4 °F (36.9 °C)] 98.2 °F (36.8 °C)  Heart Rate:  [68-85] 75  Resp:  [18-20] 20  BP: (105-116)/(53-64) 105/53    General: Alert, well appearing, in no apparent distress  Uterine Fundus: Firm   Cardiovascular: Regular rate and rhythm  Lungs: Clear to auscultation bilaterally  Abdominal: Active bowel sounds  Extremities: No cords appreciated in calves bilaterally    Labs:  Lab Results   Component Value Date    WBC 13.91 (H) 08/15/2020    HGB 10.7 (L) 08/15/2020    HCT 31.9 (L) 08/15/2020    MCV 85.8 08/15/2020     08/15/2020    ABORH O Positive 2020    RH  Positive 2020    HEPBSAG Non-Reactive 2020   ?     Assessment:  Active Hospital Problems    Diagnosis  POA   • **High-risk pregnancy in third trimester [O09.93]  Not Applicable   • Polyhydramnios [O40.9XX0]  Yes   • Excessive fetal growth affecting management of mother in third trimester, antepartum [O36.63X0]  Yes   • Mother positive for group B Streptococcus colonization [P00.2]  Not Applicable   • Insulin resistance complicating pregnancy [O26.899]  Yes   • Urinary tract infection in mother during third trimester of pregnancy [O23.43]  Yes   • Pelvic mass during pregnancy [O26.899, R19.00]  Yes   • Drug use affecting pregnancy in first trimester [O99.321]  Yes   • Tobacco smoking affecting pregnancy in third trimester [O99.333]  Yes      Resolved Hospital Problems   No resolved problems to display.       Ms. Olivas is a 25 year old  followed 2 days after vaginal delivery at 39 weeks. Discharge orders were placed yesterday, however she is still here this morning. Nurses state that Ms. Olivas's baby appeared jaundiced today.    Plan:   Discharge patient home once her baby is cleared to leave.        Signature      This document has been electronically signed by Amado Brooks MD on 2020 08:24

## 2020-08-16 NOTE — PLAN OF CARE
Problem: Patient Care Overview  Goal: Plan of Care Review  Outcome: Outcome(s) achieved  Flowsheets (Taken 2020 1024)  Outcome Summary: vss, vas 0, fundus firm, lochia light, pumping breast for feeding , ambulating, voiding spontaneously, pt ready for discharge today.

## 2020-08-16 NOTE — PLAN OF CARE
Problem: Patient Care Overview  Goal: Plan of Care Review  Outcome: Ongoing (interventions implemented as appropriate)  Flowsheets  Taken 8/15/2020 1930  Plan of Care Reviewed With: patient;significant other  Taken 2020 0609  Outcome Summary: VSS. Ambulates. Voids. Pt states no pain. Pumping/expressing breastmilk for . Plans for discharge today.

## 2020-08-17 ENCOUNTER — TELEPHONE (OUTPATIENT)
Dept: LACTATION | Facility: HOSPITAL | Age: 25
End: 2020-08-17

## 2020-08-17 NOTE — TELEPHONE ENCOUNTER
Lactation follow up call. Mother was on the phone with pediatrician. States she will call me back.

## 2020-08-17 NOTE — PAYOR COMM NOTE
"Dee Brock   [facility]  phone [phone]  fax [phone]    Auth# [id number]    Harish Pablo (25 y.o. Female)     Date of Birth Social Security Number Address Home Phone MRN    1995  [address] [phone] [id number]    [employer] Marital Status          Bahai        Admission Date Admission Type Admitting Provider Attending Provider Department, Room/Bed    8/13/20 Elective Kale Avila MD  [facility] MOTHER BABY, M758/1    Discharge Date Discharge Disposition Discharge Destination        8/16/2020 Home or Self Care              Attending Provider:  (none)   Allergies:  No Known Allergies    Isolation:  None   Infection:  None   Code Status:  Prior    Ht:  172.7 cm (68\")   Wt:  134 kg (295 lb)    Admission Cmt:  None   Principal Problem:  High-risk pregnancy in third trimester [O09.93] More...                 Active Insurance as of 8/13/2020     Primary Coverage     Payor Plan Insurance Group Employer/Plan Group    ANTHEM MEDICAID ANTHEM MEDICAID [id number]     Payor Plan Address Payor Plan Phone Number Payor Plan Fax Number Effective Dates    [address] [address] [phone]  9/1/2016 - None Entered    [facility] [address]       Subscriber Name Subscriber Birth Date Member ID       HARISH PABLO 1995 [id number]                 Emergency Contacts      (Rel.) Home Phone Work Phone Mobile Phone    Ray,Yaneth (Mother) [phone] -- [phone]    MARQUITA PABLO (Spouse) [phone] -- [phone]               Discharge Summary      Amado Brooks MD at 08/15/20 1120     Attestation signed by Kina Reyez MD at 08/16/20 1042    I have seen and evaluated the patient.  I have discussed the case with the resident.  I have reviewed the notes, assessment and plan, and/or procedures performed by the resident.  I concur with the resident’s documentation.    Patient seen and " examined.  Patient stable for d/c on  on PPD #2.    This document has been electronically signed by Kina Reyez MD on 2020 10:42.                  [facility] - Discharge Summary from Vaginal Delivery     Patient Name: Dilia Olivas  MRN: [id number]  : 1995  Admit Date: 2020  Discharge Date: 2020  Admitting Physician: Kale Avila MD  Discharge Physician: Kina Reyez MD     Admit/Discharge Diagnoses:  Active Hospital Problems    Diagnosis  POA   • **High-risk pregnancy in third trimester [O09.93]  Not Applicable   • Polyhydramnios [O40.9XX0]  Yes   • Excessive fetal growth affecting management of mother in third trimester, antepartum [O36.63X0]  Yes   • Mother positive for group B Streptococcus colonization [P00.2]  Not Applicable   • Insulin resistance complicating pregnancy [O26.899]  Yes   • Urinary tract infection in mother during third trimester of pregnancy [O23.43]  Yes   • Pelvic mass during pregnancy [O26.899, R19.00]  Yes   • Drug use affecting pregnancy in first trimester [O99.321]  Yes   • Tobacco smoking affecting pregnancy in third trimester [O99.333]  Yes      Resolved Hospital Problems   No resolved problems to display.        Hospital Course:  Ms. Olivas is a 25 year old   admitted on 2020 at 38 weeks 6 days. Her pregnancy was complicated by polyhydramnios. She was positive for group B strep and treated with penicillin G IV.     Ms. Olivas delivered vaginally at 39 weeks in a sterile field with epidural  anesthesia. Shoulders  were delivered without difficulty. Body was delivered with gentle traction, nuchal and body cord reduced, infant placed on mother's abdomen, WPDS (warmed, positioned, dried, stimulated), bulb suctioned. Cord clamped and cut after 1 minute of delayed clamping, by father at maternal request.  Cord blood collected. Placenta with 3 vessel cord delivered spontaneous appears intact. 20 units of Pitocin given.  Infant delivered over intact perineum. Vulvar abrasion required two stitches.  EBL  200 mL per nursing first measurement. Infant's weight 7 lb 11.8 oz (3510 g) . Apgars were 8 and 9 at one and five minutes, respectively.    Ms. Olivas expressed desire for discharge on 8/15/2020.         Procedures Performed:   Vaginal, Spontaneous      History:     The patient had a Vaginal, Spontaneous  on 8/14/2020 . She delivered a Male  infant that weighed 7 lb 11.8 oz (3510 g) . Apgars were 8   and 9   at one and five minutes, respectively. Delivery complications included Nuchal;Wrapped . Lacerations: None .        Subjective:    Ms. Olivas continues to feel well and continues to express a desire for discharge. Her pain is well controlled with current medications.  She is breast-feeding her baby boy, Nick, who is in the room with her and is active with provider in room.  She has had some trouble with her milk coming in but states that she has an appointment with a lactation consultant and a  on her phone. Ms. Olivas has amulated in her room but denies leaving her room and states she did not know she was supposed to. I encouraged her to walk the hallway.  She continues to deny calf tenderness. She states that her lochia continues to improve.  She is tolerating a normal diet, has passed flatus, and had a bowel movement.  She had one episode of dizziness and nausea last night per nursing, but she states that it passed and she has had no recurrent symptoms.  She will discuss her plans for birth control at her follow-up appointment with Dr. Avila      Objective:  Vitals:    08/16/20 0610   BP: 105/53   Pulse: 75   Resp: 20   Temp: 98.2 °F (36.8 °C)   SpO2: 99%        General: Alert, well appearing, in no apparent distress  Uterine Fundus: Firm   Cardiovascular: Regular rate and rhythm  Lungs: Clear to auscultation bilaterally  Abdominal: Active bowel sounds  Extremities: No cords appreciated in calves bilaterally    Labs at  Discharge:  Lab Results   Component Value Date    WBC 13.91 (H) 08/15/2020    HGB 10.7 (L) 08/15/2020    HCT 31.9 (L) 08/15/2020    MCV 85.8 08/15/2020     08/15/2020        Discharge diet:   Diet Instructions     Diet: Regular      Discharge Diet:  Regular        Activity at Discharge:  Activity Instructions     Activity as Tolerated      Bathing Restrictions      Avoid swimming and tub baths    Type of Restriction:  Bathing    Bathing Restrictions:  No Tub Bath    Driving Restrictions      No driving if requiring narcotics for pain at any time post-delivery. Avoid driving when overly fatigued.    Type of Restriction:  Driving    Driving Restrictions:  No Driving While Taking Narcotics    Lifting Restrictions      Do not lift anything heavier than baby in carseat    Type of Restriction:  Lifting    Lifting Restrictions:  Lifting Restriction (Indicate Limit)    Weight Limit (Pounds):  10    Length of Lifting Restriction:  Until followup appointment    Pelvic Rest      Sexual Activity Restrictions      Type of Restriction:  Sex    Explain Sexual Activity Restrictions:  Nothing inserted into vagina, including tampons and sex, for 6 weeks postpartum        Call MD if you experience heavy vaginal bleeding, frequent passage of clots, foul odor of lochia, increased pain, fever > 100.4F or any other concerns.    Discharge condition: Stable  Disposition: Home    Discharge Meds:     Your medication list      START taking these medications      Instructions Last Dose Given Next Dose Due   acetaminophen 500 MG tablet  Commonly known as:  TYLENOL      Take 2 tablets by mouth Every 6 (Six) Hours As Needed for Mild Pain .       docusate sodium 100 MG capsule      Take 100 mg by mouth 2 (Two) Times a Day.       ibuprofen 800 MG tablet  Commonly known as:  ADVIL,MOTRIN      Take 1 tablet by mouth Every 8 (Eight) Hours As Needed for Mild Pain .          CONTINUE taking these medications      Instructions Last Dose Given Next  Dose Due   prenatal vitamin 27-0.8 27-0.8 MG tablet tablet      Take 1 tablet by mouth Daily.             Where to Get Your Medications      These medications were sent to [facility]/pharmacy #[id number] - [place], [place] - [address] - [phone]  - [phone]  [address] [place] [address]    Phone:  [phone]   · acetaminophen 500 MG tablet  · docusate sodium 100 MG capsule  · ibuprofen 800 MG tablet         Follow Up:  Dr. Kale Avila   Two Week Postpartum Visit  [place] [address]        Signature      This document has been electronically signed by Amado Brooks MD on August 16, 2020 09:37      Electronically signed by Kina Reyez MD at 08/16/20 7229

## 2020-08-17 NOTE — PAYOR COMM NOTE
"Dee Brock   [facility]  phone [phone]  fax [phone]    Auth# [id number]    Harish Pablo (25 y.o. Female)     Date of Birth Social Security Number Address Home Phone MRN    1995  [address] [id number] [phone] [id number]    Holiness Marital Status          Worship        Admission Date Admission Type Admitting Provider Attending Provider Department, Room/Bed    8/13/20 Elective Kale Avila MD  [facility] MOTHER BABY, M758/1    Discharge Date Discharge Disposition Discharge Destination        8/16/2020 Home or Self Care              Attending Provider:  (none)   Allergies:  No Known Allergies    Isolation:  None   Infection:  None   Code Status:  Prior    Ht:  172.7 cm (68\")   Wt:  134 kg (295 lb)    Admission Cmt:  None   Principal Problem:  High-risk pregnancy in third trimester [O09.93] More...                 Active Insurance as of 8/13/2020     Primary Coverage     Payor Plan Insurance Group Employer/Plan Group    ANTHEM MEDICAID ANTHEM MEDICAID [id number]     Payor Plan Address Payor Plan Phone Number Payor Plan Fax Number Effective Dates    [address] [address] [phone]  9/1/2016 - None Entered    [facility] [address]       Subscriber Name Subscriber Birth Date Member ID       HARISH PABLO 1995 [id number]                 Emergency Contacts      (Rel.) Home Phone Work Phone Mobile Phone    Ray,Yaneth (Mother) [phone] -- [phone]    MARQUITA PABLO (Spouse) [phone] -- [phone]               Discharge Summary      Amado Brooks MD at 08/15/20 1120     Attestation signed by Kina Reyez MD at 08/16/20 1042    I have seen and evaluated the patient.  I have discussed the case with the resident.  I have reviewed the notes, assessment and plan, and/or procedures performed by the resident.  I concur with the resident’s documentation.    Patient seen and " Oswaldo stated she has another recurring bladder infection. Patient has had bladder surgery done before. Patient would like to know if there is any Urologist in area? Please call    examined.  Patient stable for d/c on  on PPD #2.    This document has been electronically signed by Kina Reyez MD on 2020 10:42.                  [facility] - Discharge Summary from Vaginal Delivery     Patient Name: Dilia Olivas  MRN: [id number]  : 1995  Admit Date: 2020  Discharge Date: 2020  Admitting Physician: Kale Avila MD  Discharge Physician: Kina Reyez MD     Admit/Discharge Diagnoses:  Active Hospital Problems    Diagnosis  POA   • **High-risk pregnancy in third trimester [O09.93]  Not Applicable   • Polyhydramnios [O40.9XX0]  Yes   • Excessive fetal growth affecting management of mother in third trimester, antepartum [O36.63X0]  Yes   • Mother positive for group B Streptococcus colonization [P00.2]  Not Applicable   • Insulin resistance complicating pregnancy [O26.899]  Yes   • Urinary tract infection in mother during third trimester of pregnancy [O23.43]  Yes   • Pelvic mass during pregnancy [O26.899, R19.00]  Yes   • Drug use affecting pregnancy in first trimester [O99.321]  Yes   • Tobacco smoking affecting pregnancy in third trimester [O99.333]  Yes      Resolved Hospital Problems   No resolved problems to display.        Hospital Course:  Ms. Olivas is a 25 year old   admitted on 2020 at 38 weeks 6 days. Her pregnancy was complicated by polyhydramnios. She was positive for group B strep and treated with penicillin G IV.     Ms. Olivas delivered vaginally at 39 weeks in a sterile field with epidural  anesthesia. Shoulders  were delivered without difficulty. Body was delivered with gentle traction, nuchal and body cord reduced, infant placed on mother's abdomen, WPDS (warmed, positioned, dried, stimulated), bulb suctioned. Cord clamped and cut after 1 minute of delayed clamping, by father at maternal request.  Cord blood collected. Placenta with 3 vessel cord delivered spontaneous appears intact. 20 units of Pitocin given.  Infant delivered over intact perineum. Vulvar abrasion required two stitches.  EBL  200 mL per nursing first measurement. Infant's weight 7 lb 11.8 oz (3510 g) . Apgars were 8 and 9 at one and five minutes, respectively.    Ms. Olivas expressed desire for discharge on 8/15/2020.         Procedures Performed:   Vaginal, Spontaneous      History:     The patient had a Vaginal, Spontaneous  on 8/14/2020 . She delivered a Male  infant that weighed 7 lb 11.8 oz (3510 g) . Apgars were 8   and 9   at one and five minutes, respectively. Delivery complications included Nuchal;Wrapped . Lacerations: None .        Subjective:    Ms. Olivas continues to feel well and continues to express a desire for discharge. Her pain is well controlled with current medications.  She is breast-feeding her baby boy, Nick, who is in the room with her and is active with provider in room.  She has had some trouble with her milk coming in but states that she has an appointment with a lactation consultant and a  on her phone. Ms. Olivas has amulated in her room but denies leaving her room and states she did not know she was supposed to. I encouraged her to walk the hallway.  She continues to deny calf tenderness. She states that her lochia continues to improve.  She is tolerating a normal diet, has passed flatus, and had a bowel movement.  She had one episode of dizziness and nausea last night per nursing, but she states that it passed and she has had no recurrent symptoms.  She will discuss her plans for birth control at her follow-up appointment with Dr. Avila      Objective:  Vitals:    08/16/20 0610   BP: 105/53   Pulse: 75   Resp: 20   Temp: 98.2 °F (36.8 °C)   SpO2: 99%        General: Alert, well appearing, in no apparent distress  Uterine Fundus: Firm   Cardiovascular: Regular rate and rhythm  Lungs: Clear to auscultation bilaterally  Abdominal: Active bowel sounds  Extremities: No cords appreciated in calves bilaterally    Labs at  Discharge:  Lab Results   Component Value Date    WBC 13.91 (H) 08/15/2020    HGB 10.7 (L) 08/15/2020    HCT 31.9 (L) 08/15/2020    MCV 85.8 08/15/2020     08/15/2020        Discharge diet:   Diet Instructions     Diet: Regular      Discharge Diet:  Regular        Activity at Discharge:  Activity Instructions     Activity as Tolerated      Bathing Restrictions      Avoid swimming and tub baths    Type of Restriction:  Bathing    Bathing Restrictions:  No Tub Bath    Driving Restrictions      No driving if requiring narcotics for pain at any time post-delivery. Avoid driving when overly fatigued.    Type of Restriction:  Driving    Driving Restrictions:  No Driving While Taking Narcotics    Lifting Restrictions      Do not lift anything heavier than baby in carseat    Type of Restriction:  Lifting    Lifting Restrictions:  Lifting Restriction (Indicate Limit)    Weight Limit (Pounds):  10    Length of Lifting Restriction:  Until followup appointment    Pelvic Rest      Sexual Activity Restrictions      Type of Restriction:  Sex    Explain Sexual Activity Restrictions:  Nothing inserted into vagina, including tampons and sex, for 6 weeks postpartum        Call MD if you experience heavy vaginal bleeding, frequent passage of clots, foul odor of lochia, increased pain, fever > 100.4F or any other concerns.    Discharge condition: Stable  Disposition: Home    Discharge Meds:     Your medication list      START taking these medications      Instructions Last Dose Given Next Dose Due   acetaminophen 500 MG tablet  Commonly known as:  TYLENOL      Take 2 tablets by mouth Every 6 (Six) Hours As Needed for Mild Pain .       docusate sodium 100 MG capsule      Take 100 mg by mouth 2 (Two) Times a Day.       ibuprofen 800 MG tablet  Commonly known as:  ADVIL,MOTRIN      Take 1 tablet by mouth Every 8 (Eight) Hours As Needed for Mild Pain .          CONTINUE taking these medications      Instructions Last Dose Given Next  Dose Due   prenatal vitamin 27-0.8 27-0.8 MG tablet tablet      Take 1 tablet by mouth Daily.             Where to Get Your Medications      These medications were sent to [facility]/pharmacy #[id number] - [place], [place] - [address] [facility] - [phone]  - [phone]  [address] [place] [address]    Phone:  [phone]   · acetaminophen 500 MG tablet  · docusate sodium 100 MG capsule  · ibuprofen 800 MG tablet         Follow Up:  Dr. Kale Avila   Two Week Postpartum Visit  [facility] [address]        Signature      This document has been electronically signed by Amado Brooks MD on August 16, 2020 09:37      Electronically signed by Kina Reyez MD at 08/16/20 3101

## 2020-08-17 NOTE — TELEPHONE ENCOUNTER
Mother called back. States that she exclusively pumps and bottle feeds the baby. She wishes to continue this. Mother states that she is pumping 1 oz every 3 hours and feeding him 1 oz of formula also. Instructions given to optimize milk production. She is trying to find in a pediatric appointment. I suggested [facility]. No further questions or concerns at this time.

## 2020-08-24 DIAGNOSIS — O36.63X0 EXCESSIVE FETAL GROWTH AFFECTING MANAGEMENT OF PREGNANCY IN THIRD TRIMESTER, SINGLE OR UNSPECIFIED FETUS: Primary | ICD-10-CM

## 2020-08-25 DIAGNOSIS — O36.63X0 EXCESSIVE FETAL GROWTH AFFECTING MANAGEMENT OF PREGNANCY IN THIRD TRIMESTER, SINGLE OR UNSPECIFIED FETUS: ICD-10-CM

## 2020-08-31 ENCOUNTER — POSTPARTUM VISIT (OUTPATIENT)
Dept: OBSTETRICS AND GYNECOLOGY | Facility: CLINIC | Age: 25
End: 2020-08-31

## 2020-08-31 VITALS
SYSTOLIC BLOOD PRESSURE: 118 MMHG | BODY MASS INDEX: 40.16 KG/M2 | DIASTOLIC BLOOD PRESSURE: 72 MMHG | HEIGHT: 68 IN | WEIGHT: 265 LBS

## 2020-08-31 DIAGNOSIS — L05.91 PILONIDAL CYST: Primary | ICD-10-CM

## 2020-08-31 PROCEDURE — 99213 OFFICE O/P EST LOW 20 MIN: CPT | Performed by: OBSTETRICS & GYNECOLOGY

## 2020-08-31 NOTE — PROGRESS NOTES
Dilia Olivas is a 25 y.o. y/o female.     Chief Complaint: Postpartum    HPI:   25 y.o. .  Patient's last menstrual period was 10/22/2019 (approximate)..  Patient is postpartum says she is having some complaints of pain in her very low back          Review of Systems     Constitutional: Denies night sweats    HENT: No hearing changes, denies ear pain    Eye: No eye pain; no foreign body in eye    Pulmonary: No hemoptysis    Cardiovascular: No claudication    GI: No hematemesis    Musculoskeletal: No arthralgias, no joint swelling    Endocrine: No polydipsia or polyuria    Hematologic: Denies any free bleeding    Psychiatric: Denies any delusions    The following portions of the patient's history were reviewed and updated as appropriate: allergies, current medications, past family history, past medical history, past social history, past surgical history and problem list.    No Known Allergies     Outpatient Medications Prior to Visit   Medication Sig Dispense Refill   • acetaminophen (TYLENOL) 500 MG tablet Take 2 tablets by mouth Every 6 (Six) Hours As Needed for Mild Pain . 30 tablet 0   • docusate sodium 100 MG capsule Take 100 mg by mouth 2 (Two) Times a Day. 60 each 0   • ibuprofen (ADVIL,MOTRIN) 800 MG tablet Take 1 tablet by mouth Every 8 (Eight) Hours As Needed for Mild Pain . 30 tablet 0   • Prenatal Vit-Fe Fumarate-FA (PRENATAL VITAMIN 27-0.8) 27-0.8 MG tablet tablet Take 1 tablet by mouth Daily.       No facility-administered medications prior to visit.         The patient has a family history of   Family History   Problem Relation Age of Onset   • Hypertension Father    • Supraventricular tachycardia Father    • No Known Problems Brother    • No Known Problems Sister    • Diabetes Paternal Grandfather    • Fibromyalgia Paternal Grandmother    • Breast cancer Maternal Grandmother    • Diabetes Maternal Grandfather    • Prostate cancer Maternal Grandfather    • Stomach cancer Maternal  Grandfather         Past Medical History:   Diagnosis Date   • Acne vulgaris    • Allergic rhinitis    • Anxiety    • Asthma    • Attention deficit hyperactivity disorder    • Chlamydia    • Conductive hearing loss     bilateral     • Depression    • Dysfunction of eustachian tube    • Elbow fracture, left    • Encounter for routine gynecological examination    • Epistaxis    • Gonorrhea    • Hand pain     right contusion      • Headache    • Heart murmur    • Herpetic maría    • High risk sexual behavior    • History of respiratory therapy 2011    Nebulizer Treatment 11515 (3) - DANI Johnson   • Insulin resistance 1/3/2017   • Irregular periods    • Laceration of foot    • Malaise and fatigue    • Mallet finger    • Myopia    • Nausea and vomiting    • Otalgia    • Otitis media    • Pain in wrist    • Pediculosis capitis    • Polycystic ovary syndrome    • PONV (postoperative nausea and vomiting)    • Scoliosis    • Sprain of foot, left    • Syncope, near    • Upper respiratory infection    • Verruca plantaris     right great toe           OB History        1    Para   1    Term   1            AB        Living   1       SAB        TAB        Ectopic        Molar        Multiple   0    Live Births   1                 Social History     Socioeconomic History   • Marital status:      Spouse name: Not on file   • Number of children: Not on file   • Years of education: Not on file   • Highest education level: Not on file   Tobacco Use   • Smoking status: Current Every Day Smoker     Packs/day: 0.25     Types: Cigarettes   • Smokeless tobacco: Never Used   • Tobacco comment: 3 cigarettes per day    Substance and Sexual Activity   • Alcohol use: Not Currently   • Drug use: No   • Sexual activity: Yes     Partners: Male     Comment: has not had a pap test         Past Surgical History:   Procedure Laterality Date   • ADENOIDECTOMY  2004   • CAUTERIZATION NASAL BLEEDERS  2004     "CAUTERIZATION INNER NOSE 05013 (1)   • OTHER SURGICAL HISTORY  04/01/2004    INCISION OF EARDRUM GENERAL ANESTHETIC 71932 (3) - BILATERAL TUBE IMPLANTS   • OVARIAN CYST SURGERY Left     pt states at 5 months pregnant cyst, left ovary and left fallopian tube were removed surgically.    • TONSILLECTOMY  2001   • WISDOM TOOTH EXTRACTION          Patient Active Problem List   Diagnosis   • Tobacco smoking affecting pregnancy in third trimester   • Drug use affecting pregnancy in first trimester   • High-risk pregnancy in third trimester   • Pelvic mass during pregnancy   • Insulin resistance complicating pregnancy   • Urinary tract infection in mother during third trimester of pregnancy   • Mother positive for group B Streptococcus colonization   • Excessive fetal growth affecting management of mother in third trimester, antepartum   • Polyhydramnios   • Postpartum state   • Pilonidal cyst        Documented Vitals    08/31/20 1530   BP: 118/72   Weight: 120 kg (265 lb)   Height: 172.7 cm (68\")        Body mass index is 40.29 kg/m².    Physical Exam  Constitutional: Appears to be in no acute distress; Eyes: sclera normal; Endocrine system: thyroid palpate is normal; Pulmonary system: lungs clear; Cardiovascular system: heart regular rate and rhythm; Gastrointestinal system: abdomen soft nontender, active bowel sounds; Urologic system: CVA negative; Psychiatric: appropriate insight; Neurologic: gait within normal limits I think there may be a pilonidal cyst not fluctuant    Laboratory Data:   Lab Results - Last 18 Months   Lab Units 08/05/20  1826 04/07/20  0454 03/25/20  1111 03/03/20  1535 03/05/19  0903   GLUCOSE mg/dL 124*  --   --  120* 88   BUN mg/dL 8 5* 9 7 13   CREATININE mg/dL 0.43* 0.3* 0.3* 0.38* 0.54   SODIUM mmol/L 135* 133* 135* 135* 137   POTASSIUM mmol/L 3.8 3.6 3.9 3.4* 4.1   CHLORIDE mmol/L 107 108* 107 102 105   TOTAL CO2 mmol/L  --  20* 18*  --   --    CO2 mmol/L 17.0*  --   --  19.0* 24.0   CALCIUM " mg/dL 8.9 7.9* 9.1 9.2 9.3   TOTAL PROTEIN g/dL 6.6 5.9* 6.8 7.1 7.7   ALBUMIN g/dL 3.50 3.0* 3.6 3.90 4.30   ALT (SGPT) U/L 13 21 33 32 14   AST (SGOT) U/L 12 23 23 19 17   ALK PHOS U/L 143* 53 55 65 79   BILIRUBIN mg/dL <0.2 0.1* 0.4 <0.2* 0.2   EGFR IF NONAFRICN AM mL/min/1.73 >150  --   --  >150 143   GLOBULIN gm/dL 3.1 2.9 3.2 3.2 3.4   A/G RATIO g/dL 1.1  --   --  1.2 1.3   BUN / CREAT RATIO  18.6 16.7 30.0 18.4 24.1   ANION GAP mmol/L 11.0  --   --  14.0 8.0     Lab Results - Last 18 Months   Lab Units 08/15/20  0532 08/13/20  0523 08/05/20  1826 04/07/20  0454 03/25/20  1111 03/03/20  1535 01/09/20  0915   WBC 10*3/mm3 13.91* 13.07* 11.68* 9.22 10.04 12.26* 9.30   RBC 10*6/mm3 3.72* 4.06 3.97 3.44* 3.84* 3.91 4.36   HEMOGLOBIN g/dL 10.7* 11.4* 11.4* 10.3* 11.5* 12.1 13.5   HEMATOCRIT % 31.9* 33.9* 33.6* 31.9* 34.1* 34.5 37.7   MCV fL 85.8 83.5 84.6 92.7 88.8 88.2 86.5   MCH pg 28.8 28.1 28.7 29.9 29.9 30.9 31.0   MCHC g/dL 33.5 33.6 33.9 32.3 33.7 35.1 35.8*   RDW % 15.3 14.8 14.7 13.5 13.1 13.2 13.7   RDW-SD fl 47.4 45.5 45.2  --   --  42.5 43.2   MPV fL 9.5 9.3 9.4 9.3 9.5 9.5 9.8   PLATELETS 10*3/mm3 246 291 289 237 252 269 276     No results for input(s): HCGQUAL in the last 84875 hours.    Assessment        Diagnosis Plan   1. Pilonidal cyst  Ambulatory Referral to General Surgery for their evaluation   2. Postpartum state           Plan       No orders of the defined types were placed in this encounter.            This document has been electronically signed by Kale Avila MD on August 31, 2020 17:11    Please note that portions of this note were completed with a voice recognition program.

## 2020-09-01 ENCOUNTER — CONSULT (OUTPATIENT)
Dept: SURGERY | Facility: CLINIC | Age: 25
End: 2020-09-01

## 2020-09-01 VITALS
DIASTOLIC BLOOD PRESSURE: 62 MMHG | WEIGHT: 262 LBS | TEMPERATURE: 97.8 F | BODY MASS INDEX: 39.84 KG/M2 | SYSTOLIC BLOOD PRESSURE: 120 MMHG | HEART RATE: 103 BPM

## 2020-09-01 DIAGNOSIS — L05.91 PILONIDAL CYST: Primary | ICD-10-CM

## 2020-09-01 PROCEDURE — 99204 OFFICE O/P NEW MOD 45 MIN: CPT | Performed by: SURGERY

## 2020-09-01 RX ORDER — SODIUM CHLORIDE 0.9 % (FLUSH) 0.9 %
3 SYRINGE (ML) INJECTION EVERY 12 HOURS SCHEDULED
Status: CANCELLED | OUTPATIENT
Start: 2020-09-01

## 2020-09-01 RX ORDER — SODIUM CHLORIDE 0.9 % (FLUSH) 0.9 %
10 SYRINGE (ML) INJECTION AS NEEDED
Status: CANCELLED | OUTPATIENT
Start: 2020-09-01

## 2020-09-01 NOTE — PROGRESS NOTES
Subjective   Dilia Olivas is a 25 y.o. female     Chief Complaint: Pilonidal cyst    History of Present Illness referred from Dr. Avila from gynecology for evaluation and treatment of a pilonidal cyst.  Patient just had her first baby 2 weeks ago.  She is not breast-feeding.  She feels a tender mass in her gluteal cleft.  She has been there for months.  No history of drainage or swelling.  Certain positions that she gets and it is tender.  No history of any injury to her back or this area.  Patient does smoke.    Review of Systems   Constitutional: Negative.    HENT: Negative.    Eyes: Negative.    Respiratory: Negative.    Cardiovascular: Negative.    Gastrointestinal: Negative.    Endocrine: Negative.    Genitourinary: Negative.    Musculoskeletal: Negative.    Skin: Negative.    Allergic/Immunologic: Negative.    Neurological: Negative.    Hematological: Negative.    Psychiatric/Behavioral: Negative.      Past Medical History:   Diagnosis Date   • Acne vulgaris    • Allergic rhinitis    • Anxiety    • Asthma    • Attention deficit hyperactivity disorder    • Chlamydia    • Conductive hearing loss     bilateral     • Depression    • Dysfunction of eustachian tube    • Elbow fracture, left    • Encounter for routine gynecological examination    • Epistaxis    • Gonorrhea    • Hand pain     right contusion      • Headache    • Heart murmur    • Herpetic maría    • High risk sexual behavior    • History of respiratory therapy 09/21/2011    Nebulizer Treatment 41919 (1) - DANI Johnson   • Insulin resistance 1/3/2017   • Irregular periods    • Laceration of foot    • Malaise and fatigue    • Mallet finger    • Myopia    • Nausea and vomiting    • Otalgia    • Otitis media    • Pain in wrist    • Pediculosis capitis    • Polycystic ovary syndrome    • PONV (postoperative nausea and vomiting)    • Scoliosis    • Sprain of foot, left    • Syncope, near    • Upper respiratory infection    • Verruca plantaris     right  great toe        Past Surgical History:   Procedure Laterality Date   • ADENOIDECTOMY  04/01/2004   • CAUTERIZATION NASAL BLEEDERS  04/01/2004    CAUTERIZATION INNER NOSE 78888 (1)   • OTHER SURGICAL HISTORY  04/01/2004    INCISION OF EARDRUM GENERAL ANESTHETIC 99689 (3) - BILATERAL TUBE IMPLANTS   • OVARIAN CYST SURGERY Left     pt states at 5 months pregnant cyst, left ovary and left fallopian tube were removed surgically.    • TONSILLECTOMY  2001   • WISDOM TOOTH EXTRACTION       Family History   Problem Relation Age of Onset   • Hypertension Father    • Supraventricular tachycardia Father    • No Known Problems Brother    • No Known Problems Sister    • Diabetes Paternal Grandfather    • Fibromyalgia Paternal Grandmother    • Breast cancer Maternal Grandmother    • Diabetes Maternal Grandfather    • Prostate cancer Maternal Grandfather    • Stomach cancer Maternal Grandfather      Social History     Socioeconomic History   • Marital status:      Spouse name: Not on file   • Number of children: Not on file   • Years of education: Not on file   • Highest education level: Not on file   Tobacco Use   • Smoking status: Current Every Day Smoker     Packs/day: 0.25     Types: Cigarettes   • Smokeless tobacco: Never Used   • Tobacco comment: 3 cigarettes per day    Substance and Sexual Activity   • Alcohol use: Not Currently   • Drug use: No   • Sexual activity: Yes     Partners: Male     Comment: has not had a pap test      No Known Allergies  Vitals:    09/01/20 0835   BP: 120/62   Pulse: 103   Temp: 97.8 °F (36.6 °C)       Home Medications:  Prior to Admission medications    Medication Sig Start Date End Date Taking? Authorizing Provider   acetaminophen (TYLENOL) 500 MG tablet Take 2 tablets by mouth Every 6 (Six) Hours As Needed for Mild Pain . 8/15/20  Yes Kina Reyez MD   Prenatal Vit-Fe Fumarate-FA (PRENATAL VITAMIN 27-0.8) 27-0.8 MG tablet tablet Take 1 tablet by mouth Daily.   Yes  Provider, MD Yamileth   docusate sodium 100 MG capsule Take 100 mg by mouth 2 (Two) Times a Day. 8/15/20   Kina Reyez MD   ibuprofen (ADVIL,MOTRIN) 800 MG tablet Take 1 tablet by mouth Every 8 (Eight) Hours As Needed for Mild Pain . 8/15/20   Kina Reyez MD       Objective   Physical Exam   Constitutional: She is oriented to person, place, and time. She appears well-developed and well-nourished. No distress.   HENT:   Head: Normocephalic and atraumatic.   Eyes: Conjunctivae and EOM are normal. No scleral icterus.   Neck: Normal range of motion. Neck supple. No tracheal deviation present. No thyromegaly present.   Cardiovascular: Normal rate, regular rhythm and normal heart sounds. Exam reveals no gallop and no friction rub.   No murmur heard.  Pulmonary/Chest: Effort normal and breath sounds normal. No stridor. No respiratory distress. She has no wheezes. She has no rales. She exhibits no tenderness.   Abdominal: Soft. Bowel sounds are normal. She exhibits no distension and no mass. There is no tenderness. There is no rebound and no guarding. No hernia.   Musculoskeletal: Normal range of motion. She exhibits no edema or deformity.   Lymphadenopathy:     She has no cervical adenopathy.   Neurological: She is alert and oriented to person, place, and time.   Skin: Skin is warm and dry. No rash noted. No cyanosis or erythema. No pallor. Nails show no clubbing.   Psychiatric: She has a normal mood and affect. Her behavior is normal. Thought content normal.   1 x 2 cm tender to palpation subcu mass right at the gluteal cleft and extended to the left side.  No redness or sinus.  Clinically does feel like a pilonidal cyst but there is no sinus tracts.  Coccyx are nontender away from this mass    Assessment/Plan Pilonidal cyst not acutely infected but symptomatic.  Recommend excision with closure with rhomboid flap.  Fully discussed the procedure alternatives risk benefits with the  patient.  She understands that she will have a drain for a few days after the procedure she clearly understands and wishes to proceed      The encounter diagnosis was Pilonidal cyst.                     This document has been electronically signed by Fermin Quijano MD on September 1, 2020 09:18

## 2020-09-01 NOTE — PATIENT INSTRUCTIONS
"BMI for Adults    Body mass index (BMI) is a number that is calculated from a person's weight and height. BMI may help to estimate how much of a person's weight is composed of fat. BMI can help identify those who may be at higher risk for certain medical problems.  How is BMI used with adults?  BMI is used as a screening tool to identify possible weight problems. It is used to check whether a person is obese, overweight, healthy weight, or underweight.  How is BMI calculated?  BMI measures your weight and compares it to your height. This can be done either in English ([place] or metric measurements. Note that charts are available to help you find your BMI quickly and easily without having to do these calculations yourself.  To calculate your BMI in English ([place] measurements, your health care provider will:  1. Measure your weight in pounds (lb).  2. Multiply the number of pounds by 703.  ? For example, for a person who weighs 180 lb, multiply that number by 703, which equals 126,540.  3. Measure your height in inches (in). Then multiply that number by itself to get a measurement called \"inches squared.\"  ? For example, for a person who is 70 in tall, the \"inches squared\" measurement is 70 in x 70 in, which equals 4900 inches squared.  4. Divide the total from Step 2 (number of lb x 703) by the total from Step 3 (inches squared): 126,540 ÷ 4900 = 25.8. This is your BMI.  To calculate your BMI in metric measurements, your health care provider will:  1. Measure your weight in kilograms (kg).  2. Measure your height in meters (m). Then multiply that number by itself to get a measurement called \"meters squared.\"  ? For example, for a person who is 1.75 m tall, the \"meters squared\" measurement is 1.75 m x 1.75 m, which is equal to 3.1 meters squared.  3. Divide the number of kilograms (your weight) by the meters squared number. In this example: 70 ÷ 3.1 = 22.6. This is your BMI.  How is BMI interpreted?  To interpret your " results, your health care provider will use BMI charts to identify whether you are underweight, normal weight, overweight, or obese. The following guidelines will be used:  · Underweight: BMI less than 18.5.  · Normal weight: BMI between 18.5 and 24.9.  · Overweight: BMI between 25 and 29.9.  · Obese: BMI of 30 and above.  Please note:  · Weight includes both fat and muscle, so someone with a muscular build, such as an athlete, may have a BMI that is higher than 24.9. In cases like these, BMI is not an accurate measure of body fat.  · To determine if excess body fat is the cause of a BMI of 25 or higher, further assessments may need to be done by a health care provider.  · BMI is usually interpreted in the same way for men and women.  Why is BMI a useful tool?  BMI is useful in two ways:  · Identifying a weight problem that may be related to a medical condition, or that may increase the risk for medical problems.  · Promoting lifestyle and diet changes in order to reach a healthy weight.  Summary  · Body mass index (BMI) is a number that is calculated from a person's weight and height.  · BMI may help to estimate how much of a person's weight is composed of fat. BMI can help identify those who may be at higher risk for certain medical problems.  · BMI can be measured using English measurements or metric measurements.  · To interpret your results, your health care provider will use BMI charts to identify whether you are underweight, normal weight, overweight, or obese.  This information is not intended to replace advice given to you by your health care provider. Make sure you discuss any questions you have with your health care provider.  Document Released: 08/29/2005 Document Revised: 11/30/2018 Document Reviewed: 10/31/2018  Elsevier Patient Education © 2020 [employer] Inc.  For more information:    Quit Now [place]  1-800-QUIT-NOW  https://[place].quitlogix.org/en-[place]/  Steps to Quit Smoking  Smoking tobacco can be  harmful to your health and can affect almost every organ in your body. Smoking puts you, and those around you, at risk for developing many serious chronic diseases. Quitting smoking is difficult, but it is one of the best things that you can do for your health. It is never too late to quit.  What are the benefits of quitting smoking?  When you quit smoking, you lower your risk of developing serious diseases and conditions, such as:  · Lung cancer or lung disease, such as COPD.  · Heart disease.  · Stroke.  · Heart attack.  · Infertility.  · Osteoporosis and bone fractures.  Additionally, symptoms such as coughing, wheezing, and shortness of breath may get better when you quit. You may also find that you get sick less often because your body is stronger at fighting off colds and infections. If you are pregnant, quitting smoking can help to reduce your chances of having a baby of low birth weight.  How do I get ready to quit?  When you decide to quit smoking, create a plan to make sure that you are successful. Before you quit:  · Pick a date to quit. Set a date within the next two weeks to give you time to prepare.  · Write down the reasons why you are quitting. Keep this list in places where you will see it often, such as on your bathroom mirror or in your car or wallet.  · Identify the people, places, things, and activities that make you want to smoke (triggers) and avoid them. Make sure to take these actions:  ¨ Throw away all cigarettes at home, at work, and in your car.  ¨ Throw away smoking accessories, such as ashtrays and lighters.  ¨ Clean your car and make sure to empty the ashtray.  ¨ Clean your home, including curtains and carpets.  · Tell your family, friends, and coworkers that you are quitting. Support from your loved ones can make quitting easier.  · Talk with your health care provider about your options for quitting smoking.  · Find out what treatment options are covered by your health  insurance.  What strategies can I use to quit smoking?  Talk with your healthcare provider about different strategies to quit smoking. Some strategies include:  · Quitting smoking altogether instead of gradually lessening how much you smoke over a period of time. Research shows that quitting “cold turkey” is more successful than gradually quitting.  · Attending in-person counseling to help you build problem-solving skills. You are more likely to have success in quitting if you attend several counseling sessions. Even short sessions of 10 minutes can be effective.  · Finding resources and support systems that can help you to quit smoking and remain smoke-free after you quit. These resources are most helpful when you use them often. They can include:  ¨ Online chats with a counselor.  ¨ Telephone quitlines.  ¨ Printed self-help materials.  ¨ Support groups or group counseling.  ¨ Text messaging programs.  ¨ Mobile phone applications.  · Taking medicines to help you quit smoking. (If you are pregnant or breastfeeding, talk with your health care provider first.) Some medicines contain nicotine and some do not. Both types of medicines help with cravings, but the medicines that include nicotine help to relieve withdrawal symptoms. Your health care provider may recommend:  ¨ Nicotine patches, gum, or lozenges.  ¨ Nicotine inhalers or sprays.  ¨ Non-nicotine medicine that is taken by mouth.  Talk with your health care provider about combining strategies, such as taking medicines while you are also receiving in-person counseling. Using these two strategies together makes you more likely to succeed in quitting than if you used either strategy on its own.  If you are pregnant or breastfeeding, talk with your health care provider about finding counseling or other support strategies to quit smoking. Do not take medicine to help you quit smoking unless told to do so by your health care provider.  What things can I do to make it  easier to quit?  Quitting smoking might feel overwhelming at first, but there is a lot that you can do to make it easier. Take these important actions:  · Reach out to your family and friends and ask that they support and encourage you during this time. Call telephone quitlines, reach out to support groups, or work with a counselor for support.  · Ask people who smoke to avoid smoking around you.  · Avoid places that trigger you to smoke, such as bars, parties, or smoke-break areas at work.  · Spend time around people who do not smoke.  · Lessen stress in your life, because stress can be a smoking trigger for some people. To lessen stress, try:  ¨ Exercising regularly.  ¨ Deep-breathing exercises.  ¨ Yoga.  ¨ Meditating.  ¨ Performing a body scan. This involves closing your eyes, scanning your body from head to toe, and noticing which parts of your body are particularly tense. Purposefully relax the muscles in those areas.  · Download or purchase mobile phone or tablet apps (applications) that can help you stick to your quit plan by providing reminders, tips, and encouragement. There are many free apps, such as QuitGuide from the CDC (Centers for Disease Control and Prevention). You can find other support for quitting smoking (smoking cessation) through smokefree.gov and other websites.  How will I feel when I quit smoking?  Within the first 24 hours of quitting smoking, you may start to feel some withdrawal symptoms. These symptoms are usually most noticeable 2-3 days after quitting, but they usually do not last beyond 2-3 weeks. Changes or symptoms that you might experience include:  · Mood swings.  · Restlessness, anxiety, or irritation.  · Difficulty concentrating.  · Dizziness.  · Strong cravings for sugary foods in addition to nicotine.  · Mild weight gain.  · Constipation.  · Nausea.  · Coughing or a sore throat.  · Changes in how your medicines work in your body.  · A depressed mood.  · Difficulty sleeping  (insomnia).  After the first 2-3 weeks of quitting, you may start to notice more positive results, such as:  · Improved sense of smell and taste.  · Decreased coughing and sore throat.  · Slower heart rate.  · Lower blood pressure.  · Clearer skin.  · The ability to breathe more easily.  · Fewer sick days.  Quitting smoking is very challenging for most people. Do not get discouraged if you are not successful the first time. Some people need to make many attempts to quit before they achieve long-term success. Do your best to stick to your quit plan, and talk with your health care provider if you have any questions or concerns.  This information is not intended to replace advice given to you by your health care provider. Make sure you discuss any questions you have with your health care provider.  Document Released: 12/12/2002 Document Revised: 08/15/2017 Document Reviewed: 05/03/2016  Elsevier Interactive Patient Education © 2017 Elsevier Inc.

## 2020-09-30 ENCOUNTER — OFFICE VISIT (OUTPATIENT)
Dept: FAMILY MEDICINE CLINIC | Facility: CLINIC | Age: 25
End: 2020-09-30

## 2020-09-30 VITALS
BODY MASS INDEX: 39.25 KG/M2 | OXYGEN SATURATION: 95 % | DIASTOLIC BLOOD PRESSURE: 64 MMHG | TEMPERATURE: 99.5 F | SYSTOLIC BLOOD PRESSURE: 100 MMHG | HEIGHT: 68 IN | HEART RATE: 63 BPM | WEIGHT: 259 LBS

## 2020-09-30 DIAGNOSIS — F41.9 ANXIETY: Primary | ICD-10-CM

## 2020-09-30 PROCEDURE — 99213 OFFICE O/P EST LOW 20 MIN: CPT | Performed by: FAMILY MEDICINE

## 2020-09-30 RX ORDER — HYDROXYZINE PAMOATE 25 MG/1
25 CAPSULE ORAL EVERY 6 HOURS PRN
Qty: 60 CAPSULE | Refills: 1 | Status: SHIPPED | OUTPATIENT
Start: 2020-09-30 | End: 2021-09-28

## 2020-09-30 NOTE — PATIENT INSTRUCTIONS

## 2020-09-30 NOTE — PROGRESS NOTES
" Subjective   Dilia Olivas is a 25 y.o. female.     Chief Complaint   Patient presents with   • Mental Health Problem             History of Present Illness     Has 6 week first baby, getting , will be living on her own.   Her mom requested she ask for some adderall.  Patient says she is ok during the day, at night gets sob and chest pain from anxiety.   Mom side bipolar illness  Patient reports she went through rehab for etoh abuse 6918-3313.  She says they told her she was manic.   She had a wonderful counselor in [place] who is still there  Patient doesn't want to leave her house     pmh reviewed.       Review of Systems   Constitutional: Negative for chills, fatigue and fever.   HENT: Negative for congestion, ear discharge, ear pain, facial swelling, hearing loss, postnasal drip, rhinorrhea, sinus pressure, sore throat, trouble swallowing and voice change.    Eyes: Negative for discharge, redness and visual disturbance.   Respiratory: Negative for cough, chest tightness, shortness of breath and wheezing.    Cardiovascular: Negative for chest pain and palpitations.   Gastrointestinal: Negative for abdominal pain, blood in stool, constipation, diarrhea, nausea and vomiting.   Endocrine: Negative for polydipsia and polyuria.   Genitourinary: Negative for dysuria, flank pain, hematuria and urgency.   Musculoskeletal: Negative for arthralgias, back pain, joint swelling and myalgias.   Skin: Negative for rash.   Neurological: Negative for dizziness, weakness, numbness and headaches.   Hematological: Negative for adenopathy.   Psychiatric/Behavioral: Negative for confusion and sleep disturbance. The patient is nervous/anxious.            /64 (BP Location: Left arm, Patient Position: Sitting, Cuff Size: Adult)   Pulse 63   Temp 99.5 °F (37.5 °C) (Temporal)   Ht 172.7 cm (67.99\")   Wt 117 kg (259 lb)   LMP 10/22/2019 (Approximate)   SpO2 95%   BMI 39.39 kg/m²       Objective     Physical " Exam  Vitals signs and nursing note reviewed.   Constitutional:       Appearance: She is well-developed.   HENT:      Head: Normocephalic and atraumatic.      Right Ear: External ear normal.      Left Ear: External ear normal.      Nose: Nose normal.   Eyes:      Conjunctiva/sclera: Conjunctivae normal.      Pupils: Pupils are equal, round, and reactive to light.   Neck:      Musculoskeletal: Normal range of motion.   Pulmonary:      Effort: Pulmonary effort is normal.   Musculoskeletal: Normal range of motion.   Neurological:      Mental Status: She is alert and oriented to person, place, and time.   Psychiatric:         Behavior: Behavior normal.         Thought Content: Thought content normal.         Judgment: Judgment normal.             PAST MEDICAL HISTORY     Past Medical History:   Diagnosis Date   • Acne vulgaris    • Allergic rhinitis    • Anxiety    • Asthma    • Attention deficit hyperactivity disorder    • Chlamydia    • Conductive hearing loss     bilateral     • Depression    • Dysfunction of eustachian tube    • Elbow fracture, left    • Encounter for routine gynecological examination    • Epistaxis    • Gonorrhea    • Hand pain     right contusion      • Headache    • Heart murmur    • Herpetic maría    • High risk sexual behavior    • History of respiratory therapy 09/21/2011    Nebulizer Treatment 69234 (1) - DANI Johnson   • Insulin resistance 1/3/2017   • Irregular periods    • Laceration of foot    • Malaise and fatigue    • Mallet finger    • Myopia    • Nausea and vomiting    • Otalgia    • Otitis media    • Pain in wrist    • Pediculosis capitis    • Polycystic ovary syndrome    • PONV (postoperative nausea and vomiting)    • Scoliosis    • Sprain of foot, left    • Syncope, near    • Upper respiratory infection    • Verruca plantaris     right great toe         PAST SURGICAL HISTORY     Past Surgical History:   Procedure Laterality Date   • ADENOIDECTOMY  04/01/2004   • CAUTERIZATION NASAL  BLEEDERS  04/01/2004    CAUTERIZATION INNER NOSE 96405 (1)   • OTHER SURGICAL HISTORY  04/01/2004    INCISION OF EARDRUM GENERAL ANESTHETIC 07831 (3) - BILATERAL TUBE IMPLANTS   • OVARIAN CYST SURGERY Left     pt states at 5 months pregnant cyst, left ovary and left fallopian tube were removed surgically.    • TONSILLECTOMY  2001   • WISDOM TOOTH EXTRACTION        SOCIAL HISTORY     Social History     Socioeconomic History   • Marital status:      Spouse name: Not on file   • Number of children: Not on file   • Years of education: Not on file   • Highest education level: Not on file   Tobacco Use   • Smoking status: Current Every Day Smoker     Packs/day: 0.25     Types: Cigarettes     Start date: 9/30/2014   • Smokeless tobacco: Never Used   • Tobacco comment: 3 cigarettes per day    Substance and Sexual Activity   • Alcohol use: Not Currently   • Drug use: Yes     Types: Marijuana   • Sexual activity: Yes     Partners: Male     Comment: has not had a pap test       ALLERGIES   Patient has no known allergies.   MEDICATIONS     Current Outpatient Medications   Medication Sig Dispense Refill   • acetaminophen (TYLENOL) 500 MG tablet Take 2 tablets by mouth Every 6 (Six) Hours As Needed for Mild Pain . 30 tablet 0   • docusate sodium 100 MG capsule Take 100 mg by mouth 2 (Two) Times a Day. 60 each 0   • hydrOXYzine pamoate (Vistaril) 25 MG capsule Take 1 capsule by mouth Every 6 (Six) Hours As Needed for Anxiety. 60 capsule 1   • ibuprofen (ADVIL,MOTRIN) 800 MG tablet Take 1 tablet by mouth Every 8 (Eight) Hours As Needed for Mild Pain . 30 tablet 0   • Prenatal Vit-Fe Fumarate-FA (PRENATAL VITAMIN 27-0.8) 27-0.8 MG tablet tablet Take 1 tablet by mouth Daily.     • sertraline (Zoloft) 50 MG tablet Take 0.5-1 tablets by mouth Daily. 30 tablet 1     No current facility-administered medications for this visit.         The following portions of the patient's history were reviewed and updated as appropriate:  allergies, current medications, past family history, past medical history, past social history, past surgical history and problem list.        Assessment/Plan   Dilia was seen today for mental health problem.    Diagnoses and all orders for this visit:    Anxiety    Post partum depression    Other orders  -     sertraline (Zoloft) 50 MG tablet; Take 0.5-1 tablets by mouth Daily.  -     hydrOXYzine pamoate (Vistaril) 25 MG capsule; Take 1 capsule by mouth Every 6 (Six) Hours As Needed for Anxiety.    return 4 weeks.  Stop medicine if becomes manic.  Start with 1/2 tablet (25mg)   Warned drowsiness and needing to be awake for baby.     No adderall.                  No follow-ups on file.                  This document has been electronically signed by Collin Johnson MD on September 30, 2020 17:56 CDT

## 2020-10-09 ENCOUNTER — OFFICE VISIT (OUTPATIENT)
Dept: OBSTETRICS AND GYNECOLOGY | Facility: CLINIC | Age: 25
End: 2020-10-09

## 2020-10-09 ENCOUNTER — LAB (OUTPATIENT)
Dept: LAB | Facility: HOSPITAL | Age: 25
End: 2020-10-09

## 2020-10-09 VITALS
WEIGHT: 265.2 LBS | HEIGHT: 68 IN | SYSTOLIC BLOOD PRESSURE: 104 MMHG | BODY MASS INDEX: 40.19 KG/M2 | DIASTOLIC BLOOD PRESSURE: 70 MMHG

## 2020-10-09 DIAGNOSIS — E28.2 PCOS (POLYCYSTIC OVARIAN SYNDROME): Primary | ICD-10-CM

## 2020-10-09 DIAGNOSIS — E28.2 PCOS (POLYCYSTIC OVARIAN SYNDROME): ICD-10-CM

## 2020-10-09 DIAGNOSIS — R87.610 ASCUS OF CERVIX WITH NEGATIVE HIGH RISK HPV: ICD-10-CM

## 2020-10-09 LAB
ANION GAP SERPL CALCULATED.3IONS-SCNC: 12.2 MMOL/L (ref 5–15)
BUN SERPL-MCNC: 18 MG/DL (ref 6–20)
BUN/CREAT SERPL: 28.6 (ref 7–25)
CALCIUM SPEC-SCNC: 9.3 MG/DL (ref 8.6–10.5)
CHLORIDE SERPL-SCNC: 105 MMOL/L (ref 98–107)
CO2 SERPL-SCNC: 21.8 MMOL/L (ref 22–29)
CREAT SERPL-MCNC: 0.63 MG/DL (ref 0.57–1)
GFR SERPL CREATININE-BSD FRML MDRD: 117 ML/MIN/1.73
GLUCOSE SERPL-MCNC: 78 MG/DL (ref 65–99)
HCG SERPL QL: NEGATIVE
POTASSIUM SERPL-SCNC: 4.1 MMOL/L (ref 3.5–5.2)
SODIUM SERPL-SCNC: 139 MMOL/L (ref 136–145)

## 2020-10-09 PROCEDURE — 36415 COLL VENOUS BLD VENIPUNCTURE: CPT

## 2020-10-09 PROCEDURE — 99213 OFFICE O/P EST LOW 20 MIN: CPT | Performed by: OBSTETRICS & GYNECOLOGY

## 2020-10-09 PROCEDURE — 84703 CHORIONIC GONADOTROPIN ASSAY: CPT

## 2020-10-09 PROCEDURE — 80048 BASIC METABOLIC PNL TOTAL CA: CPT

## 2020-10-09 RX ORDER — NORGESTIMATE AND ETHINYL ESTRADIOL 0.25-0.035
1 KIT ORAL DAILY
Qty: 28 TABLET | Refills: 12 | Status: SHIPPED | OUTPATIENT
Start: 2020-10-09 | End: 2020-10-09 | Stop reason: SDUPTHER

## 2020-10-09 RX ORDER — NORGESTIMATE AND ETHINYL ESTRADIOL 0.25-0.035
1 KIT ORAL DAILY
Qty: 28 TABLET | Refills: 12 | Status: SHIPPED | OUTPATIENT
Start: 2020-10-09 | End: 2021-01-21

## 2020-10-09 NOTE — PROGRESS NOTES
Dilia Olivas is a 25 y.o. y/o female.     Chief Complaint: Postpartum follow-up    HPI:   25 y.o. .  Patient's last menstrual period was 10/22/2019 (approximate)..  Patient seen in postpartum follow-up doing well.  Not breast-feeding wants to restart oral contraceptives oral contraceptive review of systems negative oral contraceptive warning signs and symptoms reviewed.  She has a history of PCO was on metformin wants to restart this.    We will check of labs today for the metformin.    History abnormal Pap smear ASCUS will check another Pap smear about a year since the last which will be in 2021          Review of Systems     Constitutional: Denies night sweats    HENT: No hearing changes, denies ear pain    Eye: No eye pain; no foreign body in eye    Pulmonary: No hemoptysis    Cardiovascular: No claudication    GI: No hematemesis    Musculoskeletal: No arthralgias, no joint swelling    Endocrine: No polydipsia or polyuria    Hematologic: Denies any free bleeding    Psychiatric: Denies any delusions    The following portions of the patient's history were reviewed and updated as appropriate: allergies, current medications, past family history, past medical history, past social history, past surgical history and problem list.    No Known Allergies     Outpatient Medications Prior to Visit   Medication Sig Dispense Refill   • hydrOXYzine pamoate (Vistaril) 25 MG capsule Take 1 capsule by mouth Every 6 (Six) Hours As Needed for Anxiety. 60 capsule 1   • sertraline (Zoloft) 50 MG tablet Take 0.5-1 tablets by mouth Daily. 30 tablet 1   • acetaminophen (TYLENOL) 500 MG tablet Take 2 tablets by mouth Every 6 (Six) Hours As Needed for Mild Pain . 30 tablet 0   • docusate sodium 100 MG capsule Take 100 mg by mouth 2 (Two) Times a Day. 60 each 0   • ibuprofen (ADVIL,MOTRIN) 800 MG tablet Take 1 tablet by mouth Every 8 (Eight) Hours As Needed for Mild Pain . 30 tablet 0   • Prenatal Vit-Fe Fumarate-FA  (PRENATAL VITAMIN 27-0.8) 27-0.8 MG tablet tablet Take 1 tablet by mouth Daily.       No facility-administered medications prior to visit.         The patient has a family history of   Family History   Problem Relation Age of Onset   • Hypertension Father    • Supraventricular tachycardia Father    • No Known Problems Brother    • No Known Problems Sister    • Diabetes Paternal Grandfather    • Fibromyalgia Paternal Grandmother    • Breast cancer Maternal Grandmother    • Diabetes Maternal Grandfather    • Prostate cancer Maternal Grandfather    • Stomach cancer Maternal Grandfather         Past Medical History:   Diagnosis Date   • Acne vulgaris    • Allergic rhinitis    • Anxiety    • Asthma    • Attention deficit hyperactivity disorder    • Chlamydia    • Conductive hearing loss     bilateral     • Depression    • Dysfunction of eustachian tube    • Elbow fracture, left    • Encounter for routine gynecological examination    • Epistaxis    • Gonorrhea    • Hand pain     right contusion      • Headache    • Heart murmur    • Herpetic maría    • High risk sexual behavior    • History of respiratory therapy 2011    Nebulizer Treatment 25309 (0) - DANI Johnson   • Insulin resistance 1/3/2017   • Irregular periods    • Laceration of foot    • Malaise and fatigue    • Mallet finger    • Myopia    • Nausea and vomiting    • Otalgia    • Otitis media    • Pain in wrist    • Pediculosis capitis    • Polycystic ovary syndrome    • PONV (postoperative nausea and vomiting)    • Scoliosis    • Sprain of foot, left    • Syncope, near    • Upper respiratory infection    • Verruca plantaris     right great toe           OB History        1    Para   1    Term   1            AB        Living   1       SAB        TAB        Ectopic        Molar        Multiple   0    Live Births   1                 Social History     Socioeconomic History   • Marital status:      Spouse name: Not on file   • Number of  "children: Not on file   • Years of education: Not on file   • Highest education level: Not on file   Tobacco Use   • Smoking status: Current Every Day Smoker     Packs/day: 0.25     Types: Cigarettes     Start date: 9/30/2014   • Smokeless tobacco: Never Used   • Tobacco comment: 3 cigarettes per day    Substance and Sexual Activity   • Alcohol use: Not Currently   • Drug use: Yes     Types: Marijuana   • Sexual activity: Yes     Partners: Male     Comment: has not had a pap test         Past Surgical History:   Procedure Laterality Date   • ADENOIDECTOMY  04/01/2004   • CAUTERIZATION NASAL BLEEDERS  04/01/2004    CAUTERIZATION INNER NOSE 91657 (1)   • OTHER SURGICAL HISTORY  04/01/2004    INCISION OF EARDRUM GENERAL ANESTHETIC 93180 (3) - BILATERAL TUBE IMPLANTS   • OVARIAN CYST SURGERY Left     pt states at 5 months pregnant cyst, left ovary and left fallopian tube were removed surgically.    • TONSILLECTOMY  2001   • WISDOM TOOTH EXTRACTION          Patient Active Problem List   Diagnosis   • Tobacco smoking affecting pregnancy in third trimester   • Drug use affecting pregnancy in first trimester   • High-risk pregnancy in third trimester   • Pelvic mass during pregnancy   • Insulin resistance complicating pregnancy   • Urinary tract infection in mother during third trimester of pregnancy   • Mother positive for group B Streptococcus colonization   • Excessive fetal growth affecting management of mother in third trimester, antepartum   • Polyhydramnios   • Postpartum state   • Pilonidal cyst   • PCOS (polycystic ovarian syndrome)   • ASCUS of cervix with negative high risk HPV        Documented Vitals    10/09/20 0919   BP: 104/70   Weight: 120 kg (265 lb 3.2 oz)   Height: 172.7 cm (67.99\")   PainSc: 0-No pain        Body mass index is 40.34 kg/m².    Physical Exam  Constitutional: Appears to be in no acute distress; Eyes: sclera normal; Endocrine system: thyroid palpate is normal; Pulmonary system: lungs clear; " Cardiovascular system: heart regular rate and rhythm; Gastrointestinal system: abdomen soft nontender, active bowel sounds; Urologic system: CVA negative; Psychiatric: appropriate insight; Neurologic: gait within normal limits     Laboratory Data:   Lab Results - Last 18 Months   Lab Units 08/05/20 1826 04/07/20 0454 03/25/20  1111 03/03/20  1535   GLUCOSE mg/dL 124*  --   --  120*   BUN mg/dL 8 5* 9 7   CREATININE mg/dL 0.43* 0.3* 0.3* 0.38*   SODIUM mmol/L 135* 133* 135* 135*   POTASSIUM mmol/L 3.8 3.6 3.9 3.4*   CHLORIDE mmol/L 107 108* 107 102   TOTAL CO2 mmol/L  --  20* 18*  --    CO2 mmol/L 17.0*  --   --  19.0*   CALCIUM mg/dL 8.9 7.9* 9.1 9.2   TOTAL PROTEIN g/dL 6.6 5.9* 6.8 7.1   ALBUMIN g/dL 3.50 3.0* 3.6 3.90   ALT (SGPT) U/L 13 21 33 32   AST (SGOT) U/L 12 23 23 19   ALK PHOS U/L 143* 53 55 65   BILIRUBIN mg/dL <0.2 0.1* 0.4 <0.2*   EGFR IF NONAFRICN AM mL/min/1.73 >150  --   --  >150   GLOBULIN gm/dL 3.1 2.9 3.2 3.2   A/G RATIO g/dL 1.1  --   --  1.2   BUN / CREAT RATIO  18.6 16.7 30.0 18.4   ANION GAP mmol/L 11.0  --   --  14.0     Lab Results - Last 18 Months   Lab Units 08/15/20  0532 08/13/20  0523 08/05/20 1826 04/07/20 0454 03/25/20  1111 03/03/20  1535 01/09/20  0915   WBC 10*3/mm3 13.91* 13.07* 11.68* 9.22 10.04 12.26* 9.30   RBC 10*6/mm3 3.72* 4.06 3.97 3.44* 3.84* 3.91 4.36   HEMOGLOBIN g/dL 10.7* 11.4* 11.4* 10.3* 11.5* 12.1 13.5   HEMATOCRIT % 31.9* 33.9* 33.6* 31.9* 34.1* 34.5 37.7   MCV fL 85.8 83.5 84.6 92.7 88.8 88.2 86.5   MCH pg 28.8 28.1 28.7 29.9 29.9 30.9 31.0   MCHC g/dL 33.5 33.6 33.9 32.3 33.7 35.1 35.8*   RDW % 15.3 14.8 14.7 13.5 13.1 13.2 13.7   RDW-SD fl 47.4 45.5 45.2  --   --  42.5 43.2   MPV fL 9.5 9.3 9.4 9.3 9.5 9.5 9.8   PLATELETS 10*3/mm3 246 291 289 237 252 269 276     No results for input(s): HCGQUAL in the last 34761 hours.    Assessment        Diagnosis Plan   1. PCOS (polycystic ovarian syndrome)  Basic Metabolic Panel    hCG, Serum, Qualitative    Metformin  restarted   2. ASCUS of cervix with negative high risk HPV           Plan         New Medications Ordered This Visit   Medications   • norgestimate-ethinyl estradiol (Sprintec 28) 0.25-35 MG-MCG per tablet     Sig: Take 1 tablet by mouth Daily for 28 doses.     Dispense:  28 tablet     Refill:  12   • metFORMIN (Glucophage) 500 MG tablet     Sig: Take 1 tablet by mouth 2 (Two) Times a Day With Meals.     Dispense:  60 tablet     Refill:  11             This document has been electronically signed by Kale Avila MD on October 9, 2020 15:17 CDT    Please note that portions of this note were completed with a voice recognition program.

## 2020-10-10 ENCOUNTER — TELEPHONE (OUTPATIENT)
Dept: OBSTETRICS AND GYNECOLOGY | Facility: CLINIC | Age: 25
End: 2020-10-10

## 2020-10-11 ENCOUNTER — TELEPHONE (OUTPATIENT)
Dept: OBSTETRICS AND GYNECOLOGY | Facility: CLINIC | Age: 25
End: 2020-10-11

## 2020-10-14 ENCOUNTER — TELEPHONE (OUTPATIENT)
Dept: OBSTETRICS AND GYNECOLOGY | Facility: CLINIC | Age: 25
End: 2020-10-14

## 2020-10-15 ENCOUNTER — TELEPHONE (OUTPATIENT)
Dept: OBSTETRICS AND GYNECOLOGY | Facility: CLINIC | Age: 25
End: 2020-10-15

## 2020-11-10 ENCOUNTER — TELEPHONE (OUTPATIENT)
Dept: FAMILY MEDICINE CLINIC | Facility: CLINIC | Age: 25
End: 2020-11-10

## 2020-11-10 NOTE — TELEPHONE ENCOUNTER
PATIENT STATES SHE HAS BROKE HER SHOULDER AND WAS SEEN AT [facility] THIS PAST Sunday     SHE IS REQUESTING PAIN MEDICATION OF HYDROCODONE     SHE DID NOT GIVE MUCH MORE INFO JUST STATES SHE REALLY WANTED TO SPEAK WITH DR RICARDO DUMONT CONTACT NUMBER   [phone] (H)      [place] [id number] [address] AT [facility] [address] - [phone]  - [phone]   [phone]

## 2020-12-07 NOTE — TELEPHONE ENCOUNTER
Caller: Dilia Olivas    Relationship: Self    Best call back number:[phone]    Medication needed:   Requested Prescriptions     Pending Prescriptions Disp Refills   • sertraline (Zoloft) 50 MG tablet 30 tablet 1     Sig: Take 0.5-1 tablets by mouth Daily.       When do you need the refill by: TODAY      Does the patient have less than a 3 day supply:  [] Yes  [x] No    What is the patient's preferred pharmacy: [facility], [address] [phone] [facility] [phone] FX

## 2021-01-21 ENCOUNTER — OFFICE VISIT (OUTPATIENT)
Dept: OBSTETRICS AND GYNECOLOGY | Facility: CLINIC | Age: 26
End: 2021-01-21

## 2021-01-21 VITALS
HEIGHT: 68 IN | BODY MASS INDEX: 38.04 KG/M2 | DIASTOLIC BLOOD PRESSURE: 78 MMHG | SYSTOLIC BLOOD PRESSURE: 130 MMHG | WEIGHT: 251 LBS

## 2021-01-21 DIAGNOSIS — Z30.017 NEXPLANON INSERTION: Primary | ICD-10-CM

## 2021-01-21 PROBLEM — O36.63X0 EXCESSIVE FETAL GROWTH AFFECTING MANAGEMENT OF MOTHER IN THIRD TRIMESTER, ANTEPARTUM: Status: RESOLVED | Noted: 2020-08-04 | Resolved: 2021-01-21

## 2021-01-21 PROBLEM — O99.333 TOBACCO SMOKING AFFECTING PREGNANCY IN THIRD TRIMESTER: Status: RESOLVED | Noted: 2020-01-09 | Resolved: 2021-01-21

## 2021-01-21 PROBLEM — O99.321 DRUG USE AFFECTING PREGNANCY IN FIRST TRIMESTER: Status: RESOLVED | Noted: 2020-01-29 | Resolved: 2021-01-21

## 2021-01-21 PROBLEM — O26.899 PELVIC MASS DURING PREGNANCY: Status: RESOLVED | Noted: 2020-03-23 | Resolved: 2021-01-21

## 2021-01-21 PROBLEM — O26.899 INSULIN RESISTANCE COMPLICATING PREGNANCY: Status: RESOLVED | Noted: 2020-07-25 | Resolved: 2021-01-21

## 2021-01-21 PROBLEM — R19.00 PELVIC MASS DURING PREGNANCY: Status: RESOLVED | Noted: 2020-03-23 | Resolved: 2021-01-21

## 2021-01-21 PROBLEM — O09.93 HIGH-RISK PREGNANCY IN THIRD TRIMESTER: Status: RESOLVED | Noted: 2020-03-11 | Resolved: 2021-01-21

## 2021-01-21 PROBLEM — O23.43 URINARY TRACT INFECTION IN MOTHER DURING THIRD TRIMESTER OF PREGNANCY: Status: RESOLVED | Noted: 2020-07-25 | Resolved: 2021-01-21

## 2021-01-21 PROBLEM — O40.9XX0 POLYHYDRAMNIOS: Status: RESOLVED | Noted: 2020-08-13 | Resolved: 2021-01-21

## 2021-01-21 PROCEDURE — 11981 INSERTION DRUG DLVR IMPLANT: CPT | Performed by: NURSE PRACTITIONER

## 2021-01-21 RX ORDER — OFLOXACIN 3 MG/ML
5 SOLUTION AURICULAR (OTIC) 2 TIMES DAILY
Qty: 5 ML | Refills: 0 | Status: SHIPPED | OUTPATIENT
Start: 2021-01-21 | End: 2021-01-26

## 2021-01-21 NOTE — PROGRESS NOTES
Nexplanon Insertion    Patient's last menstrual period was 01/19/2021 (approximate).    Date of procedure:  1/21/2021    Risks and benefits discussed? yes  All questions answered? yes  Consents given by the patient  Written consent obtained? yes    Local anesthesia used:  yes - 3 cc's of  Meds; anesthesia local: None, 1% lidocaine with epinephrine    Procedure documentation:    The upper right arm (non-dominant) was marked at the intended site of insertion. The skin was cleansed with an antiseptic solution.  Local anesthesia was injected.  The Nexplanon was placed subdermally without difficulty.  The devise was able to be palpated in the arm by both myself and Dilia.  The site was cleansed then a 4x4 clean gauze was place over the site of insertion and wrapped with gauze.     She tolerated the procedure well.  There were no complications.  EBL was minimal.    Nexplanon  [id number]    Post procedure instructions: Remove the wrapping in 24 hours and cover with a  band aid if still open.    Follow up needed: PRN    This note was electronically signed.    Zahraa Sanchez, APRN  January 21, 2021

## 2021-03-11 ENCOUNTER — OFFICE VISIT (OUTPATIENT)
Dept: OBSTETRICS AND GYNECOLOGY | Facility: CLINIC | Age: 26
End: 2021-03-11

## 2021-03-11 VITALS
HEIGHT: 68 IN | BODY MASS INDEX: 36.37 KG/M2 | WEIGHT: 240 LBS | SYSTOLIC BLOOD PRESSURE: 140 MMHG | DIASTOLIC BLOOD PRESSURE: 80 MMHG

## 2021-03-11 DIAGNOSIS — L70.0 CYSTIC ACNE: ICD-10-CM

## 2021-03-11 DIAGNOSIS — Z30.46 ENCOUNTER FOR SURVEILLANCE OF NEXPLANON SUBDERMAL CONTRACEPTIVE: ICD-10-CM

## 2021-03-11 DIAGNOSIS — Z01.411 ENCOUNTER FOR GYNECOLOGICAL EXAMINATION WITH ABNORMAL FINDING: Primary | ICD-10-CM

## 2021-03-11 PROCEDURE — 99395 PREV VISIT EST AGE 18-39: CPT | Performed by: NURSE PRACTITIONER

## 2021-03-11 RX ORDER — CLINDAMYCIN HYDROCHLORIDE 150 MG/1
150 CAPSULE ORAL 2 TIMES DAILY
Qty: 20 CAPSULE | Refills: 0 | Status: SHIPPED | OUTPATIENT
Start: 2021-03-11 | End: 2021-03-21

## 2021-03-11 NOTE — PROGRESS NOTES
"Subjective   Dilia Olivas is a 25 y.o. needs pap and has c/o frequent bleeding since Nexplanon placed in January.     LMP- daily since 1/21  Last pap- 1/9/2020 ASCUS    Has c/o \"bumpy sores\" on right cheek up under and behind ear since Nexplanon was placed.    Gynecologic Exam  The patient's primary symptoms include vaginal bleeding. The patient's pertinent negatives include no genital itching, genital lesions, genital odor, genital rash, pelvic pain or vaginal discharge. This is a recurrent problem. The current episode started more than 1 month ago. The problem occurs daily. The problem has been unchanged. The patient is experiencing no pain. She is not pregnant. Pertinent negatives include no abdominal pain, chills, constipation, diarrhea, dysuria, fever, frequency, painful intercourse or urgency. The vaginal bleeding is spotting. She has not been passing clots. She has not been passing tissue. Nothing aggravates the symptoms. She has tried nothing for the symptoms. The treatment provided no relief. She is sexually active. No, her partner does not have an STD. Contraceptive use: Nexplanon. Her menstrual history has been irregular.       The following portions of the patient's history were reviewed and updated as appropriate: allergies, current medications, past medical history, past social history and problem list.    Review of Systems   Constitutional: Negative for activity change, appetite change, chills, diaphoresis, fatigue, fever, unexpected weight gain and unexpected weight loss.   Respiratory: Negative for chest tightness and shortness of breath.    Cardiovascular: Negative for chest pain and palpitations.   Gastrointestinal: Negative for abdominal distention, abdominal pain, constipation and diarrhea.   Endocrine: Negative.    Genitourinary: Positive for menstrual problem. Negative for amenorrhea, breast discharge, breast lump, breast pain, decreased libido, difficulty urinating, dyspareunia, " dysuria, frequency, pelvic pain, pelvic pressure, urgency, urinary incontinence, vaginal bleeding, vaginal discharge and vaginal pain.   Musculoskeletal: Negative for myalgias.   Skin: Negative for color change, dry skin and skin lesions.   Neurological: Negative for light-headedness and headache.   Psychiatric/Behavioral: Negative for agitation, dysphoric mood, sleep disturbance, depressed mood and stress. The patient is not nervous/anxious.        Objective   Physical Exam  Vitals and nursing note reviewed. Exam conducted with a chaperone present.   Constitutional:       General: She is awake. She is not in acute distress.     Appearance: Normal appearance. She is well-developed and well-groomed. She is obese. She is not ill-appearing, toxic-appearing or diaphoretic.   Neck:      Thyroid: No thyroid mass, thyromegaly or thyroid tenderness.   Cardiovascular:      Rate and Rhythm: Normal rate and regular rhythm.      Heart sounds: Normal heart sounds.   Pulmonary:      Effort: Pulmonary effort is normal.      Breath sounds: Normal breath sounds.   Chest:      Breasts: García Score is 5. Breasts are symmetrical.         Right: Normal. No swelling, bleeding, inverted nipple, mass, nipple discharge, skin change or tenderness.         Left: Normal. No swelling, bleeding, inverted nipple, mass, nipple discharge, skin change or tenderness.   Abdominal:      General: Bowel sounds are normal. There is no distension.      Palpations: Abdomen is soft.      Tenderness: There is no abdominal tenderness.   Genitourinary:     General: Normal vulva.      Exam position: Lithotomy position.      García stage (genital): 5.      Labia:         Right: No rash, tenderness, lesion or injury.         Left: No rash, tenderness, lesion or injury.       Urethra: No prolapse, urethral pain, urethral swelling or urethral lesion.      Vagina: No signs of injury and foreign body. Vaginal discharge and bleeding present. No erythema, tenderness,  lesions or prolapsed vaginal walls.      Cervix: Discharge and cervical bleeding present. No cervical motion tenderness, friability, lesion, erythema or eversion.      Uterus: Normal.       Adnexa: Right adnexa normal and left adnexa normal.        Right: No mass, tenderness or fullness.          Left: No mass, tenderness or fullness.        Comments: Pap obtained.  Lymphadenopathy:      Upper Body:      Right upper body: No supraclavicular, axillary or pectoral adenopathy.      Left upper body: No supraclavicular, axillary or pectoral adenopathy.      Lower Body: No right inguinal adenopathy. No left inguinal adenopathy.   Skin:     General: Skin is warm and dry.          Neurological:      Mental Status: She is alert and oriented to person, place, and time.      Gait: Gait is intact.   Psychiatric:         Attention and Perception: Attention and perception normal.         Mood and Affect: Mood and affect normal.         Speech: Speech normal.         Behavior: Behavior normal. Behavior is cooperative.           Assessment/Plan   Diagnoses and all orders for this visit:    1. Encounter for gynecological examination with abnormal finding (Primary)  -     Liquid-based Pap Smear, Screening    2. Encounter for surveillance of Nexplanon subdermal contraceptive    3. Cystic acne  -     Ambulatory Referral to Dermatology    Other orders  -     clindamycin (Cleocin) 150 MG capsule; Take 1 capsule by mouth 2 (two) times a day for 10 days.  Dispense: 20 capsule; Refill: 0      RBA and potential s/e of antibiotic noted. Pt requests referral to dermatology in [place]. Advised to start washing face with Cera-Ve cleanser twice daily. Reviewed cervical cancer screening recommendations. Advised her to allow another 4 months for the bleeding to resolve with Nexplanon. If still having issues at that time she should RTC to discuss other options.

## 2021-03-16 LAB
LAB AP CASE REPORT: NORMAL
PATH INTERP SPEC-IMP: NORMAL

## 2021-04-28 ENCOUNTER — TELEPHONE (OUTPATIENT)
Dept: BARIATRICS/WEIGHT MGMT | Facility: CLINIC | Age: 26
End: 2021-04-28

## 2021-06-17 ENCOUNTER — TELEPHONE (OUTPATIENT)
Dept: BARIATRICS/WEIGHT MGMT | Facility: CLINIC | Age: 26
End: 2021-06-17

## 2021-06-17 NOTE — TELEPHONE ENCOUNTER
I called to discuss no-show with patient and to ask if she would like to reschedule her appointment.  No answer.  Voicemail left requesting patient to return our call.

## 2021-06-25 ENCOUNTER — TELEPHONE (OUTPATIENT)
Dept: BARIATRICS/WEIGHT MGMT | Facility: CLINIC | Age: 26
End: 2021-06-25

## 2021-06-28 ENCOUNTER — TELEPHONE (OUTPATIENT)
Dept: BARIATRICS/WEIGHT MGMT | Facility: CLINIC | Age: 26
End: 2021-06-28

## 2021-08-16 ENCOUNTER — TELEPHONE (OUTPATIENT)
Dept: BARIATRICS/WEIGHT MGMT | Facility: CLINIC | Age: 26
End: 2021-08-16

## 2021-08-17 ENCOUNTER — TELEPHONE (OUTPATIENT)
Dept: BARIATRICS/WEIGHT MGMT | Facility: CLINIC | Age: 26
End: 2021-08-17

## 2021-08-17 NOTE — TELEPHONE ENCOUNTER
Called the patient is unsure if she wants to follow through with an appointment with our office. She will call to reschedule when she makes a decision

## 2021-09-24 ENCOUNTER — OFFICE VISIT (OUTPATIENT)
Dept: OBSTETRICS AND GYNECOLOGY | Facility: CLINIC | Age: 26
End: 2021-09-24

## 2021-09-24 VITALS
SYSTOLIC BLOOD PRESSURE: 120 MMHG | WEIGHT: 218 LBS | HEIGHT: 68 IN | DIASTOLIC BLOOD PRESSURE: 76 MMHG | BODY MASS INDEX: 33.04 KG/M2

## 2021-09-24 DIAGNOSIS — R87.610 ASCUS OF CERVIX WITH NEGATIVE HIGH RISK HPV: Primary | ICD-10-CM

## 2021-09-24 DIAGNOSIS — N89.8 VAGINAL ODOR: ICD-10-CM

## 2021-09-24 PROCEDURE — 99213 OFFICE O/P EST LOW 20 MIN: CPT | Performed by: NURSE PRACTITIONER

## 2021-09-24 NOTE — PROGRESS NOTES
Subjective   Dilia Olivas is a 26 y.o. presents for repeat pap and has c/o vaginal odor.    LMP- irregular with Nexplanon, spotting now  Last pap- 1/9/2020 ASCUS neg hrHPV    Nexplanon placed- January 2021    Gynecologic Exam  The patient's primary symptoms include a genital odor. The patient's pertinent negatives include no genital itching, genital lesions, genital rash, missed menses, pelvic pain, vaginal bleeding or vaginal discharge. This is a recurrent problem. The current episode started 1 to 4 weeks ago. The problem occurs constantly. The problem has been gradually worsening. Pertinent negatives include no abdominal pain, chills, constipation, diarrhea, dysuria, fever, frequency or urgency. She is sexually active. No, her partner does not have an STD. Contraceptive use: nexplanon. Her menstrual history has been irregular. Her past medical history is significant for ovarian cysts and vaginosis. There is no history of miscarriage, perineal abscess, PID, an STD or a terminated pregnancy.       The following portions of the patient's history were reviewed and updated as appropriate: allergies, current medications, past family history, past medical history, past social history, past surgical history and problem list.    Review of Systems   Constitutional: Negative for activity change, appetite change, chills, diaphoresis, fatigue, fever, unexpected weight gain and unexpected weight loss.   Respiratory: Negative for chest tightness and shortness of breath.    Cardiovascular: Negative for chest pain and palpitations.   Gastrointestinal: Negative for abdominal distention, abdominal pain, constipation and diarrhea.   Endocrine: Negative.    Genitourinary: Negative for amenorrhea, breast discharge, breast lump, breast pain, decreased libido, decreased urine volume, difficulty urinating, dyspareunia, dysuria, frequency, genital sores, menstrual problem, missed menses, pelvic pain, pelvic pressure, urgency,  urinary incontinence, vaginal bleeding, vaginal discharge and vaginal pain.   Musculoskeletal: Negative for myalgias.   Skin: Negative for color change, dry skin and skin lesions.   Neurological: Negative for light-headedness and headache.   Psychiatric/Behavioral: Negative for agitation, dysphoric mood, sleep disturbance, depressed mood and stress. The patient is not nervous/anxious.        Objective   Physical Exam  Vitals and nursing note reviewed. Exam conducted with a chaperone present.   Constitutional:       General: She is awake. She is not in acute distress.     Appearance: Normal appearance. She is well-developed and well-groomed. She is obese. She is not ill-appearing, toxic-appearing or diaphoretic.   Neck:      Thyroid: No thyroid mass, thyromegaly or thyroid tenderness.   Cardiovascular:      Rate and Rhythm: Normal rate and regular rhythm.      Heart sounds: Normal heart sounds.   Pulmonary:      Effort: Pulmonary effort is normal.      Breath sounds: Normal breath sounds.   Chest:      Breasts: García Score is 5. Breasts are symmetrical.         Right: Normal. No swelling, bleeding, inverted nipple, mass, nipple discharge, skin change or tenderness.         Left: Normal. No swelling, bleeding, inverted nipple, mass, nipple discharge, skin change or tenderness.   Abdominal:      General: Bowel sounds are normal. There is no distension.      Palpations: Abdomen is soft.      Tenderness: There is no abdominal tenderness.   Genitourinary:     General: Normal vulva.      Exam position: Lithotomy position.      García stage (genital): 5.      Labia:         Right: No rash, tenderness, lesion or injury.         Left: No rash, tenderness, lesion or injury.       Urethra: No prolapse, urethral pain, urethral swelling or urethral lesion.      Vagina: Normal.      Cervix: Discharge and cervical bleeding present. No cervical motion tenderness, friability, lesion, erythema or eversion.      Uterus: Normal.        Adnexa: Right adnexa normal and left adnexa normal.        Right: No mass, tenderness or fullness.          Left: No mass, tenderness or fullness.        Comments: Pap and One swab obtained.  Lymphadenopathy:      Upper Body:      Right upper body: No supraclavicular, axillary or pectoral adenopathy.      Left upper body: No supraclavicular, axillary or pectoral adenopathy.      Lower Body: No right inguinal adenopathy. No left inguinal adenopathy.   Skin:     General: Skin is warm and dry.   Neurological:      Mental Status: She is alert and oriented to person, place, and time.      Gait: Gait is intact.   Psychiatric:         Attention and Perception: Attention and perception normal.         Mood and Affect: Mood and affect normal.         Speech: Speech normal.         Behavior: Behavior normal. Behavior is cooperative.           Assessment/Plan   Diagnoses and all orders for this visit:    1. ASCUS of cervix with negative high risk HPV (Primary)  -     Liquid-based Pap Smear, Screening    2. Vaginal odor  -     OneSwab - Kit, Vagina; Future      Will call with results when available. Stop using B&BW on vaginal area for cleansing.

## 2021-09-28 ENCOUNTER — OFFICE VISIT (OUTPATIENT)
Dept: FAMILY MEDICINE CLINIC | Facility: CLINIC | Age: 26
End: 2021-09-28

## 2021-09-28 VITALS
DIASTOLIC BLOOD PRESSURE: 76 MMHG | WEIGHT: 216.4 LBS | SYSTOLIC BLOOD PRESSURE: 112 MMHG | OXYGEN SATURATION: 93 % | TEMPERATURE: 98.3 F | BODY MASS INDEX: 32.05 KG/M2 | HEIGHT: 69 IN | HEART RATE: 77 BPM

## 2021-09-28 DIAGNOSIS — F39 MOOD DISORDER (HCC): Primary | ICD-10-CM

## 2021-09-28 PROCEDURE — 99213 OFFICE O/P EST LOW 20 MIN: CPT | Performed by: FAMILY MEDICINE

## 2021-09-28 RX ORDER — DIVALPROEX SODIUM 500 MG/1
500-1000 TABLET, EXTENDED RELEASE ORAL NIGHTLY
Qty: 60 TABLET | Refills: 0 | Status: SHIPPED | OUTPATIENT
Start: 2021-09-28 | End: 2021-10-05 | Stop reason: SDUPTHER
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
## 2021-09-29 LAB
LAB AP CASE REPORT: NORMAL
PATH INTERP SPEC-IMP: NORMAL

## 2021-09-30 ENCOUNTER — TELEPHONE (OUTPATIENT)
Dept: FAMILY MEDICINE CLINIC | Facility: CLINIC | Age: 26
End: 2021-09-30

## 2021-10-04 ENCOUNTER — TELEPHONE (OUTPATIENT)
Dept: OBSTETRICS AND GYNECOLOGY | Facility: CLINIC | Age: 26
End: 2021-10-04

## 2021-10-04 RX ORDER — FLUCONAZOLE 150 MG/1
TABLET ORAL
Qty: 4 TABLET | Refills: 0 | Status: SHIPPED | OUTPATIENT
Start: 2021-10-04 | End: 2021-10-14

## 2021-10-04 RX ORDER — METRONIDAZOLE 500 MG/1
500 TABLET ORAL 2 TIMES DAILY
Qty: 14 TABLET | Refills: 0 | Status: SHIPPED | OUTPATIENT
Start: 2021-10-04 | End: 2021-10-11

## 2021-10-04 RX ORDER — AMPICILLIN 500 MG/1
500 CAPSULE ORAL 2 TIMES DAILY
Qty: 14 CAPSULE | Refills: 0 | Status: SHIPPED | OUTPATIENT
Start: 2021-10-04 | End: 2021-10-11

## 2021-10-05 ENCOUNTER — OFFICE VISIT (OUTPATIENT)
Dept: FAMILY MEDICINE CLINIC | Facility: CLINIC | Age: 26
End: 2021-10-05

## 2021-10-05 ENCOUNTER — LAB (OUTPATIENT)
Dept: LAB | Facility: HOSPITAL | Age: 26
End: 2021-10-05

## 2021-10-05 VITALS
HEART RATE: 72 BPM | OXYGEN SATURATION: 98 % | WEIGHT: 214.6 LBS | TEMPERATURE: 99 F | BODY MASS INDEX: 31.78 KG/M2 | DIASTOLIC BLOOD PRESSURE: 78 MMHG | HEIGHT: 69 IN | SYSTOLIC BLOOD PRESSURE: 118 MMHG

## 2021-10-05 DIAGNOSIS — F39 MOOD DISORDER (HCC): Primary | ICD-10-CM

## 2021-10-05 DIAGNOSIS — N89.8 VAGINAL ODOR: ICD-10-CM

## 2021-10-05 PROCEDURE — 84460 ALANINE AMINO (ALT) (SGPT): CPT | Performed by: FAMILY MEDICINE

## 2021-10-05 PROCEDURE — 99213 OFFICE O/P EST LOW 20 MIN: CPT | Performed by: FAMILY MEDICINE

## 2021-10-05 PROCEDURE — 80164 ASSAY DIPROPYLACETIC ACD TOT: CPT | Performed by: FAMILY MEDICINE

## 2021-10-05 RX ORDER — DIVALPROEX SODIUM 500 MG/1
1500 TABLET, EXTENDED RELEASE ORAL NIGHTLY
Qty: 90 TABLET | Refills: 0 | Status: SHIPPED | OUTPATIENT
Start: 2021-10-05 | End: 2022-09-08

## 2021-10-05 RX ORDER — SERTRALINE HYDROCHLORIDE 25 MG/1
25 TABLET, FILM COATED ORAL DAILY
Qty: 30 TABLET | Refills: 1 | Status: SHIPPED | OUTPATIENT
Start: 2021-10-05 | End: 2021-10-14 | Stop reason: ALTCHOICE

## 2021-10-05 NOTE — PROGRESS NOTES
" Subjective   Dilia Olivas is a 26 y.o. female.     Chief Complaint   Patient presents with   • Follow-up     1 wk mood disorder             History of Present Illness     ming[place] angry.  1000mg hs of depakote not making her sleep.  She sleeps if also takes a tylenol pm.   Crying easily.  Missing her   Worried he will find out about her mential issues and take her child or children away from her.   She is smoking pot.  She says not as much but is smoking pot.   She wants something to lift her mood.     Review of Systems   Constitutional: Negative for chills, fatigue and fever.   HENT: Negative for congestion, ear discharge, ear pain, facial swelling, hearing loss, postnasal drip, rhinorrhea, sinus pressure, sore throat, trouble swallowing and voice change.    Eyes: Negative for discharge, redness and visual disturbance.   Respiratory: Negative for cough, chest tightness, shortness of breath and wheezing.    Cardiovascular: Negative for chest pain and palpitations.   Gastrointestinal: Negative for abdominal pain, blood in stool, constipation, diarrhea, nausea and vomiting.   Endocrine: Negative for polydipsia and polyuria.   Genitourinary: Negative for dysuria, flank pain, hematuria and urgency.   Musculoskeletal: Negative for arthralgias, back pain, joint swelling and myalgias.   Skin: Negative for rash.   Neurological: Negative for dizziness, weakness, numbness and headaches.   Hematological: Negative for adenopathy.   Psychiatric/Behavioral: Negative for confusion and sleep disturbance. The patient is not nervous/anxious.            /78 (BP Location: Left arm, Patient Position: Sitting, Cuff Size: Adult)   Pulse 72   Temp 99 °F (37.2 °C) (Temporal)   Ht 175.3 cm (69\")   Wt 97.3 kg (214 lb 9.6 oz)   LMP 09/21/2021   SpO2 98%   BMI 31.69 kg/m²       Objective     Physical Exam  Vitals and nursing note reviewed.   Constitutional:       Appearance: Normal appearance. She is well-developed. "   HENT:      Head: Normocephalic and atraumatic.      Right Ear: External ear normal.      Left Ear: External ear normal.      Nose: Nose normal. No rhinorrhea.   Eyes:      General: No scleral icterus.     Extraocular Movements: Extraocular movements intact.      Conjunctiva/sclera: Conjunctivae normal.      Pupils: Pupils are equal, round, and reactive to light.   Cardiovascular:      Rate and Rhythm: Normal rate and regular rhythm.      Heart sounds: Normal heart sounds. No friction rub. No gallop.    Pulmonary:      Effort: Pulmonary effort is normal.      Breath sounds: Normal breath sounds.   Abdominal:      General: Bowel sounds are normal. There is no distension.      Palpations: Abdomen is soft.      Tenderness: There is no abdominal tenderness.   Musculoskeletal:         General: No deformity. Normal range of motion.      Cervical back: Normal range of motion and neck supple.   Skin:     General: Skin is warm and dry.      Findings: No erythema or rash.   Neurological:      Mental Status: She is alert and oriented to person, place, and time.      Cranial Nerves: No cranial nerve deficit.   Psychiatric:         Behavior: Behavior normal.         Thought Content: Thought content normal.         Judgment: Judgment normal.             PAST MEDICAL HISTORY     Past Medical History:   Diagnosis Date   • Acne vulgaris    • Allergic rhinitis    • Anxiety    • Asthma    • Attention deficit hyperactivity disorder    • Chlamydia    • Conductive hearing loss     bilateral     • Depression    • Dysfunction of eustachian tube    • Elbow fracture, left    • Encounter for routine gynecological examination    • Epistaxis    • Gonorrhea    • Hand pain     right contusion      • Headache    • Heart murmur    • Herpetic maría    • High risk sexual behavior    • History of respiratory therapy 09/21/2011    Nebulizer Treatment 99488 (1) - DANI Johnson   • Insulin resistance 1/3/2017   • Irregular periods    • Laceration of foot     • Malaise and fatigue    • Mallet finger    • Myopia    • Nausea and vomiting    • Otalgia    • Otitis media    • Pain in wrist    • Pediculosis capitis    • Polycystic ovary syndrome    • PONV (postoperative nausea and vomiting)    • Scoliosis    • Sprain of foot, left    • Syncope, near    • Upper respiratory infection    • Verruca plantaris     right great toe         PAST SURGICAL HISTORY     Past Surgical History:   Procedure Laterality Date   • ADENOIDECTOMY  04/01/2004   • CAUTERIZATION NASAL BLEEDERS  04/01/2004    CAUTERIZATION INNER NOSE 45029 (1)   • OTHER SURGICAL HISTORY  04/01/2004    INCISION OF EARDRUM GENERAL ANESTHETIC 26747 (3) - BILATERAL TUBE IMPLANTS   • OVARIAN CYST SURGERY Left     pt states at 5 months pregnant cyst, left ovary and left fallopian tube were removed surgically.    • TONSILLECTOMY  2001   • WISDOM TOOTH EXTRACTION        SOCIAL HISTORY     Social History     Socioeconomic History   • Marital status:      Spouse name: Not on file   • Number of children: Not on file   • Years of education: Not on file   • Highest education level: Not on file   Tobacco Use   • Smoking status: Current Every Day Smoker     Packs/day: 0.25     Types: Cigarettes     Start date: 9/30/2014   • Smokeless tobacco: Never Used   • Tobacco comment: VAPE   Substance and Sexual Activity   • Alcohol use: Not Currently   • Drug use: Yes     Types: Marijuana   • Sexual activity: Yes     Partners: Male     Comment: has not had a pap test       ALLERGIES   Patient has no known allergies.   MEDICATIONS     Current Outpatient Medications   Medication Sig Dispense Refill   • ampicillin (PRINCIPEN) 500 MG capsule Take 1 capsule by mouth 2 (two) times a day for 7 days. 14 capsule 0   • divalproex (Depakote ER) 500 MG 24 hr tablet Take 3 tablets by mouth Every Night. 90 tablet 0   • fluconazole (Diflucan) 150 MG tablet 1 tablet every 4 days times 4 doses 4 tablet 0   • metFORMIN (Glucophage) 500 MG tablet Take 1  tablet by mouth 2 (Two) Times a Day With Meals. 60 tablet 11   • metroNIDAZOLE (Flagyl) 500 MG tablet Take 1 tablet by mouth 2 (Two) Times a Day for 7 days. 14 tablet 0   • sertraline (Zoloft) 25 MG tablet Take 1 tablet by mouth Daily. 30 tablet 1     No current facility-administered medications for this visit.        The following portions of the patient's history were reviewed and updated as appropriate: allergies, current medications, past family history, past medical history, past social history, past surgical history and problem list.        Assessment/Plan   Diagnoses and all orders for this visit:    1. Mood disorder (HCC) (Primary)  -     Valproic Acid Level, Total  -     ALT    Other orders  -     divalproex (Depakote ER) 500 MG 24 hr tablet; Take 3 tablets by mouth Every Night.  Dispense: 90 tablet; Refill: 0  -     sertraline (Zoloft) 25 MG tablet; Take 1 tablet by mouth Daily.  Dispense: 30 tablet; Refill: 1      Increase depakote er to 1500mg hs  Start zoloft 25mg qd.     Follow up 1 week.                  No follow-ups on file.                  This document has been electronically signed by Collin Johnson MD on October 5, 2021 17:10 CDT

## 2021-10-06 LAB
ALT SERPL W P-5'-P-CCNC: 14 U/L (ref 1–33)
VALPROATE SERPL-MCNC: 74 MCG/ML (ref 50–125)

## 2021-10-14 ENCOUNTER — OFFICE VISIT (OUTPATIENT)
Dept: FAMILY MEDICINE CLINIC | Facility: CLINIC | Age: 26
End: 2021-10-14

## 2021-10-14 ENCOUNTER — LAB (OUTPATIENT)
Dept: LAB | Facility: HOSPITAL | Age: 26
End: 2021-10-14

## 2021-10-14 VITALS
HEIGHT: 69 IN | OXYGEN SATURATION: 98 % | BODY MASS INDEX: 31.25 KG/M2 | SYSTOLIC BLOOD PRESSURE: 119 MMHG | DIASTOLIC BLOOD PRESSURE: 72 MMHG | TEMPERATURE: 97.9 F | HEART RATE: 67 BPM | WEIGHT: 211 LBS

## 2021-10-14 DIAGNOSIS — G47.00 INSOMNIA, UNSPECIFIED TYPE: ICD-10-CM

## 2021-10-14 DIAGNOSIS — F39 MOOD DISORDER (HCC): Primary | ICD-10-CM

## 2021-10-14 PROCEDURE — 80164 ASSAY DIPROPYLACETIC ACD TOT: CPT | Performed by: FAMILY MEDICINE

## 2021-10-14 PROCEDURE — 84460 ALANINE AMINO (ALT) (SGPT): CPT | Performed by: FAMILY MEDICINE

## 2021-10-14 PROCEDURE — 99213 OFFICE O/P EST LOW 20 MIN: CPT | Performed by: FAMILY MEDICINE

## 2021-10-14 RX ORDER — MIRTAZAPINE 15 MG/1
15 TABLET, FILM COATED ORAL NIGHTLY
Qty: 30 TABLET | Refills: 1 | Status: SHIPPED | OUTPATIENT
Start: 2021-10-14 | End: 2022-09-08

## 2021-10-14 NOTE — PROGRESS NOTES
" Subjective   Dilia Olivas is a 26 y.o. female.     Chief Complaint   Patient presents with   • Depression             History of Present Illness     Mood is much better, still not sleeping.  Taking three tylenol pm at night to sleep. She started zoloft but her mom? Made her stop because it took away her emotions?  She thought she was fine.    She quit smoking pot.   She stopped her red bulls  Coffee 3 times weekly  No tea, no pop.     Review of Systems   Constitutional: Negative for chills, fatigue and fever.   HENT: Negative for congestion, ear discharge, ear pain, facial swelling, hearing loss, postnasal drip, rhinorrhea, sinus pressure, sore throat, trouble swallowing and voice change.    Eyes: Negative for discharge, redness and visual disturbance.   Respiratory: Negative for cough, chest tightness, shortness of breath and wheezing.    Cardiovascular: Negative for chest pain and palpitations.   Gastrointestinal: Negative for abdominal pain, blood in stool, constipation, diarrhea, nausea and vomiting.   Endocrine: Negative for polydipsia and polyuria.   Genitourinary: Negative for dysuria, flank pain, hematuria and urgency.   Musculoskeletal: Negative for arthralgias, back pain, joint swelling and myalgias.   Skin: Negative for rash.   Neurological: Negative for dizziness, weakness, numbness and headaches.   Hematological: Negative for adenopathy.   Psychiatric/Behavioral: Positive for sleep disturbance. Negative for confusion. The patient is not nervous/anxious.            /72 (BP Location: Left arm, Patient Position: Sitting, Cuff Size: Adult)   Pulse 67   Temp 97.9 °F (36.6 °C) (Temporal)   Ht 175.3 cm (69.02\")   Wt 95.7 kg (211 lb)   LMP 09/21/2021   SpO2 98%   BMI 31.14 kg/m²       Objective     Physical Exam  Vitals and nursing note reviewed.   Constitutional:       Appearance: Normal appearance. She is well-developed.   HENT:      Head: Normocephalic and atraumatic.      Right Ear: " External ear normal.      Left Ear: External ear normal.      Nose: Nose normal.   Eyes:      Extraocular Movements: Extraocular movements intact.      Conjunctiva/sclera: Conjunctivae normal.      Pupils: Pupils are equal, round, and reactive to light.   Pulmonary:      Effort: Pulmonary effort is normal.   Musculoskeletal:         General: Normal range of motion.      Cervical back: Normal range of motion.   Neurological:      General: No focal deficit present.      Mental Status: She is alert and oriented to person, place, and time.   Psychiatric:         Behavior: Behavior normal.         Thought Content: Thought content normal.         Judgment: Judgment normal.             PAST MEDICAL HISTORY     Past Medical History:   Diagnosis Date   • Acne vulgaris    • Allergic rhinitis    • Anxiety    • Asthma    • Attention deficit hyperactivity disorder    • Chlamydia    • Conductive hearing loss     bilateral     • Depression    • Dysfunction of eustachian tube    • Elbow fracture, left    • Encounter for routine gynecological examination    • Epistaxis    • Gonorrhea    • Hand pain     right contusion      • Headache    • Heart murmur    • Herpetic maría    • High risk sexual behavior    • History of respiratory therapy 09/21/2011    Nebulizer Treatment 64944 (1) - DANI Johnson   • Insulin resistance 1/3/2017   • Irregular periods    • Laceration of foot    • Malaise and fatigue    • Mallet finger    • Myopia    • Nausea and vomiting    • Otalgia    • Otitis media    • Pain in wrist    • Pediculosis capitis    • Polycystic ovary syndrome    • PONV (postoperative nausea and vomiting)    • Scoliosis    • Sprain of foot, left    • Syncope, near    • Upper respiratory infection    • Verruca plantaris     right great toe         PAST SURGICAL HISTORY     Past Surgical History:   Procedure Laterality Date   • ADENOIDECTOMY  04/01/2004   • CAUTERIZATION NASAL BLEEDERS  04/01/2004    CAUTERIZATION INNER NOSE 01545 (1)   •  OTHER SURGICAL HISTORY  04/01/2004    INCISION OF EARDRUM GENERAL ANESTHETIC 92285 (3) - BILATERAL TUBE IMPLANTS   • OVARIAN CYST SURGERY Left     pt states at 5 months pregnant cyst, left ovary and left fallopian tube were removed surgically.    • TONSILLECTOMY  2001   • WISDOM TOOTH EXTRACTION        SOCIAL HISTORY     Social History     Socioeconomic History   • Marital status:    Tobacco Use   • Smoking status: Current Every Day Smoker     Packs/day: 0.25     Years: 7.00     Pack years: 1.75     Types: Cigarettes     Start date: 9/30/2014   • Smokeless tobacco: Never Used   • Tobacco comment: VAPE   Substance and Sexual Activity   • Alcohol use: Not Currently   • Drug use: Yes     Types: Marijuana   • Sexual activity: Yes     Partners: Male     Comment: has not had a pap test       ALLERGIES   Patient has no known allergies.   MEDICATIONS     Current Outpatient Medications   Medication Sig Dispense Refill   • divalproex (Depakote ER) 500 MG 24 hr tablet Take 3 tablets by mouth Every Night. 90 tablet 0   • metFORMIN (Glucophage) 500 MG tablet Take 1 tablet by mouth 2 (Two) Times a Day With Meals. 60 tablet 11   • mirtazapine (Remeron) 15 MG tablet Take 1 tablet by mouth Every Night. 30 tablet 1     No current facility-administered medications for this visit.        The following portions of the patient's history were reviewed and updated as appropriate: allergies, current medications, past family history, past medical history, past social history, past surgical history and problem list.        Assessment/Plan   Diagnoses and all orders for this visit:    1. Mood disorder (HCC) (Primary)  -     ALT  -     Valproic Acid Level, Total    2. Insomnia, unspecified type    Other orders  -     mirtazapine (Remeron) 15 MG tablet; Take 1 tablet by mouth Every Night.  Dispense: 30 tablet; Refill: 1  -     metFORMIN (Glucophage) 500 MG tablet; Take 1 tablet by mouth 2 (Two) Times a Day With Meals.  Dispense: 60  tablet; Refill: 11      Stay on current depakote dose.  She thinks it is helping.     I explained sleep is a habit. She needs to reform the habit.     Will try remeron 15mg at bedtime to help her sleep and help depressoin.     After she gets back into the habit of sleep, may change back to zoloft.     Explained will take weeks to find out if zoloft would help?      Follow up 2 weeks.                  No follow-ups on file.                  This document has been electronically signed by Collin Johnson MD on October 14, 2021 17:45 CDT

## 2021-10-14 NOTE — PATIENT INSTRUCTIONS
"BMI for Adults  What is BMI?  Body mass index (BMI) is a number that is calculated from a person's weight and height. BMI can help estimate how much of a person's weight is composed of fat. BMI does not measure body fat directly. Rather, it is an alternative to procedures that directly measure body fat, which can be difficult and expensive.  BMI can help identify people who may be at higher risk for certain medical problems.  What are BMI measurements used for?  BMI is used as a screening tool to identify possible weight problems. It helps determine whether a person is obese, overweight, a healthy weight, or underweight.  BMI is useful for:  · Identifying a weight problem that may be related to a medical condition or may increase the risk for medical problems.  · Promoting changes, such as changes in diet and exercise, to help reach a healthy weight. BMI screening can be repeated to see if these changes are working.  How is BMI calculated?  BMI involves measuring your weight in relation to your height. Both height and weight are measured, and the BMI is calculated from those numbers. This can be done either in English ([place] or metric measurements. Note that charts and online BMI calculators are available to help you find your BMI quickly and easily without having to do these calculations yourself.  To calculate your BMI in English ([place] measurements:    1. Measure your weight in pounds (lb).  2. Multiply the number of pounds by 703.  ? For example, for a person who weighs 180 lb, multiply that number by 703, which equals 126,540.  3. Measure your height in inches. Then multiply that number by itself to get a measurement called \"inches squared.\"  ? For example, for a person who is 70 inches tall, the \"inches squared\" measurement is 70 inches x 70 inches, which equals 4,900 inches squared.  4. Divide the total from step 2 (number of lb x 703) by the total from step 3 (inches squared): 126,540 ÷ 4,900 = 25.8. This is " "your BMI.    To calculate your BMI in metric measurements:  1. Measure your weight in kilograms (kg).  2. Measure your height in meters (m). Then multiply that number by itself to get a measurement called \"meters squared.\"  ? For example, for a person who is 1.75 m tall, the \"meters squared\" measurement is 1.75 m x 1.75 m, which is equal to 3.1 meters squared.  3. Divide the number of kilograms (your weight) by the meters squared number. In this example: 70 ÷ 3.1 = 22.6. This is your BMI.  What do the results mean?  BMI charts are used to identify whether you are underweight, normal weight, overweight, or obese. The following guidelines will be used:  · Underweight: BMI less than 18.5.  · Normal weight: BMI between 18.5 and 24.9.  · Overweight: BMI between 25 and 29.9.  · Obese: BMI of 30 or above.  Keep these notes in mind:  · Weight includes both fat and muscle, so someone with a muscular build, such as an athlete, may have a BMI that is higher than 24.9. In cases like these, BMI is not an accurate measure of body fat.  · To determine if excess body fat is the cause of a BMI of 25 or higher, further assessments may need to be done by a health care provider.  · BMI is usually interpreted in the same way for men and women.  Where to find more information  For more information about BMI, including tools to quickly calculate your BMI, go to these websites:  · Centers for Disease Control and Prevention: www.cdc.gov  · American Heart Association: www.heart.org  · National Heart, Lung, and [employer] [place]: www.nhlbi.nih.gov  Summary  · Body mass index (BMI) is a number that is calculated from a person's weight and height.  · BMI may help estimate how much of a person's weight is composed of fat. BMI can help identify those who may be at higher risk for certain medical problems.  · BMI can be measured using English measurements or metric measurements.  · BMI charts are used to identify whether you are underweight, normal " weight, overweight, or obese.  This information is not intended to replace advice given to you by your health care provider. Make sure you discuss any questions you have with your health care provider.  Document Revised: 09/09/2020 Document Reviewed: 07/17/2020  Elsevier Patient Education © 2021 [employer]

## 2021-10-15 LAB
ALT SERPL W P-5'-P-CCNC: 13 U/L (ref 1–33)
VALPROATE SERPL-MCNC: 91 MCG/ML (ref 50–125)

## 2022-02-19 ENCOUNTER — HOSPITAL ENCOUNTER (EMERGENCY)
Facility: HOSPITAL | Age: 27
Discharge: HOME | End: 2022-02-19
Attending: EMERGENCY MEDICINE | Admitting: EMERGENCY MEDICINE

## 2022-02-19 VITALS
OXYGEN SATURATION: 98 % | BODY MASS INDEX: 29.92 KG/M2 | DIASTOLIC BLOOD PRESSURE: 82 MMHG | WEIGHT: 202 LBS | HEART RATE: 100 BPM | TEMPERATURE: 98 F | HEIGHT: 69 IN | SYSTOLIC BLOOD PRESSURE: 125 MMHG | RESPIRATION RATE: 18 BRPM

## 2022-02-19 DIAGNOSIS — H65.113 ACUTE ALLERGIC SEROUS OTITIS MEDIA OF BOTH EARS: Primary | ICD-10-CM

## 2022-02-19 PROCEDURE — 63710000001 PREDNISONE PER 5 MG: Performed by: NURSE PRACTITIONER

## 2022-02-19 PROCEDURE — 99283 EMERGENCY DEPT VISIT LOW MDM: CPT

## 2022-02-19 PROCEDURE — 63710000001 ONDANSETRON ODT 4 MG TABLET DISPERSIBLE: Performed by: NURSE PRACTITIONER

## 2022-02-19 RX ORDER — FLUTICASONE PROPIONATE 50 MCG
2 SPRAY, SUSPENSION (ML) NASAL DAILY
Qty: 16 G | Refills: 0 | Status: SHIPPED | OUTPATIENT
Start: 2022-02-19 | End: 2022-09-08

## 2022-02-19 RX ORDER — ONDANSETRON 4 MG/1
4 TABLET, ORALLY DISINTEGRATING ORAL ONCE
Status: COMPLETED | OUTPATIENT
Start: 2022-02-19 | End: 2022-02-19

## 2022-02-19 RX ORDER — CETIRIZINE HYDROCHLORIDE, PSEUDOEPHEDRINE HYDROCHLORIDE 5; 120 MG/1; MG/1
1 TABLET, FILM COATED, EXTENDED RELEASE ORAL ONCE
Status: COMPLETED | OUTPATIENT
Start: 2022-02-19 | End: 2022-02-19

## 2022-02-19 RX ORDER — CETIRIZINE HYDROCHLORIDE, PSEUDOEPHEDRINE HYDROCHLORIDE 5; 120 MG/1; MG/1
1 TABLET, FILM COATED, EXTENDED RELEASE ORAL 2 TIMES DAILY
Qty: 20 TABLET | Refills: 0 | Status: SHIPPED | OUTPATIENT
Start: 2022-02-19 | End: 2022-03-01

## 2022-02-19 RX ORDER — PREDNISONE 20 MG/1
60 TABLET ORAL DAILY
Qty: 15 TABLET | Refills: 0 | Status: SHIPPED | OUTPATIENT
Start: 2022-02-19 | End: 2022-02-24

## 2022-02-19 RX ADMIN — CETIRIZINE HCL, PSEUDOEPHEDRINE HCL 1 TABLET: 5; 120 TABLET, EXTENDED RELEASE ORAL at 05:02

## 2022-02-19 RX ADMIN — PREDNISONE 60 MG: 50 TABLET ORAL at 05:00

## 2022-02-19 RX ADMIN — ONDANSETRON 4 MG: 4 TABLET, ORALLY DISINTEGRATING ORAL at 05:15

## 2022-02-19 NOTE — ED PROVIDER NOTES
Time: 05:27 EST  Arrived by: EMS  Chief Complaint: Hearing loss  History provided by: Patient  History is limited by: N/A    History of Present Illness:  Patient is a 26 y.o. year old female that presents to the emergency department with muffled hearing tonight.      Ear Fullness  Location:  Bilateral ears  Quality:  Muffled  Severity:  Mild  Onset quality:  Gradual  Duration:  8 hours  Timing:  Constant  Progression:  Unchanged  Chronicity:  New  Context:  Patient states that her hearing is decreased and sounds muffled since last night.  Only thing she can think of is she clean the house where they have 9 cats and she is allergic to cats  Relieved by:  Nothing  Worsened by:  Nothing  Ineffective treatments:  None tried  Associated symptoms: no abdominal pain, no chest pain, no congestion, no cough, no diarrhea, no ear pain, no fatigue, no fever, no headaches, no loss of consciousness, no myalgias, no nausea, no rash, no rhinorrhea, no shortness of breath, no sore throat, no vomiting and no wheezing      Similar Symptoms Previously: No  Recently seen: No      Patient Care Team  Primary Care Provider: None    Past Medical History:     No Known Allergies  Past Medical History:   Diagnosis Date   • Acne vulgaris    • Allergic rhinitis    • Anxiety    • Asthma    • Attention deficit hyperactivity disorder    • Chlamydia    • Conductive hearing loss     bilateral     • Depression    • Dysfunction of eustachian tube    • Elbow fracture, left    • Encounter for routine gynecological examination    • Epistaxis    • Gonorrhea    • Hand pain     right contusion      • Headache    • Heart murmur    • Herpetic maría    • High risk sexual behavior    • History of respiratory therapy 09/21/2011    Nebulizer Treatment 07932 (1) - DANI Johnson   • Insulin resistance 1/3/2017   • Irregular periods    • Laceration of foot    • Malaise and fatigue    • Mallet finger    • Myopia    • Nausea and vomiting    • Otalgia    • Otitis media    •  Pain in wrist    • Pediculosis capitis    • Polycystic ovary syndrome    • PONV (postoperative nausea and vomiting)    • Scoliosis    • Sprain of foot, left    • Syncope, near    • Upper respiratory infection    • Verruca plantaris     right great toe        Past Surgical History:   Procedure Laterality Date   • ADENOIDECTOMY  04/01/2004   • CAUTERIZATION NASAL BLEEDERS  04/01/2004    CAUTERIZATION INNER NOSE 44056 (1)   • OTHER SURGICAL HISTORY  04/01/2004    INCISION OF EARDRUM GENERAL ANESTHETIC 33743 (3) - BILATERAL TUBE IMPLANTS   • OVARIAN CYST SURGERY Left     pt states at 5 months pregnant cyst, left ovary and left fallopian tube were removed surgically.    • TONSILLECTOMY  2001   • WISDOM TOOTH EXTRACTION       Family History   Problem Relation Age of Onset   • Hypertension Father    • Supraventricular tachycardia Father    • No Known Problems Brother    • No Known Problems Sister    • Diabetes Paternal Grandfather    • Fibromyalgia Paternal Grandmother    • Breast cancer Maternal Grandmother    • Diabetes Maternal Grandfather    • Prostate cancer Maternal Grandfather    • Stomach cancer Maternal Grandfather        Home Medications:  Prior to Admission medications    Medication Sig Start Date End Date Taking? Authorizing Provider   divalproex (Depakote ER) 500 MG 24 hr tablet Take 3 tablets by mouth Every Night. 10/5/21   Collin Johnson MD   metFORMIN (Glucophage) 500 MG tablet Take 1 tablet by mouth 2 (Two) Times a Day With Meals. 10/14/21 10/14/22  Collin Johnson MD   mirtazapine (Remeron) 15 MG tablet Take 1 tablet by mouth Every Night. 10/14/21   Collin Johnson MD        Social History:   PT  reports that she has been smoking cigarettes. She started smoking about 7 years ago. She has a 1.75 pack-year smoking history. She has never used smokeless tobacco. She reports previous alcohol use. She reports current drug use. Drug: Marijuana.    Record Review:  I have reviewed the patient's records in  "[facility].     Review of Systems  Review of Systems   Constitutional: Negative for fatigue and fever.   HENT: Positive for hearing loss. Negative for congestion, drooling, ear discharge, ear pain, facial swelling, postnasal drip, rhinorrhea, sinus pressure, sinus pain, sneezing, sore throat, tinnitus, trouble swallowing and voice change.    Eyes: Negative for redness and itching.   Respiratory: Negative for cough, shortness of breath and wheezing.    Cardiovascular: Negative for chest pain.   Gastrointestinal: Negative for abdominal pain, diarrhea, nausea and vomiting.   Musculoskeletal: Negative for myalgias.   Skin: Negative for rash.   Neurological: Negative.  Negative for loss of consciousness and headaches.   Hematological: Negative.    Psychiatric/Behavioral: Negative.         Physical Exam  /82 (BP Location: Left arm, Patient Position: Sitting)   Pulse 100   Temp 98 °F (36.7 °C) (Oral)   Resp 18   Ht 175.3 cm (69\")   Wt 91.6 kg (202 lb)   SpO2 98%   BMI 29.83 kg/m²     Physical Exam  Vitals and nursing note reviewed.   Constitutional:       General: She is not in acute distress.     Appearance: Normal appearance. She is not toxic-appearing.   HENT:      Head: Normocephalic and atraumatic.      Right Ear: Ear canal and external ear normal. There is no impacted cerumen.      Left Ear: Ear canal and external ear normal. There is no impacted cerumen.      Ears:      Comments: Old scarring bilateral tm's from tubes in past    Small fluid bubbles bilateral tm     Nose: Nose normal.      Mouth/Throat:      Mouth: Mucous membranes are moist.   Eyes:      General: No scleral icterus.     Conjunctiva/sclera: Conjunctivae normal.   Cardiovascular:      Rate and Rhythm: Normal rate and regular rhythm.      Pulses: Normal pulses.      Heart sounds: Normal heart sounds.   Pulmonary:      Effort: Pulmonary effort is normal. No respiratory distress.      Breath sounds: Normal breath sounds.   Abdominal:      General: " "Abdomen is flat.      Palpations: Abdomen is soft.      Tenderness: There is no abdominal tenderness.   Musculoskeletal:         General: Normal range of motion.      Cervical back: Normal range of motion and neck supple.   Skin:     General: Skin is warm and dry.   Neurological:      Mental Status: She is alert and oriented to person, place, and time.   Psychiatric:         Mood and Affect: Mood normal.         Behavior: Behavior normal.          ED Course  /82 (BP Location: Left arm, Patient Position: Sitting)   Pulse 100   Temp 98 °F (36.7 °C) (Oral)   Resp 18   Ht 175.3 cm (69\")   Wt 91.6 kg (202 lb)   SpO2 98%   BMI 29.83 kg/m²   Results for orders placed or performed in visit on 10/14/21   ALT    Specimen: Blood   Result Value Ref Range    ALT (SGPT) 13 1 - 33 U/L   Valproic Acid Level, Total    Specimen: Blood   Result Value Ref Range    Valproic Acid 91.0 50.0 - 125.0 mcg/mL     Medications   predniSONE (DELTASONE) tablet 60 mg (has no administration in time range)   cetirizine-pseudoephedrine (ZyrTEC-D) 5-120 MG per 12 hr tablet 1 tablet (has no administration in time range)     No results found.    Medical Decision Making:                     MDM  Number of Diagnoses or Management Options  Acute allergic serous otitis media of both ears  Diagnosis management comments: I have spoken with the patient. I have explained the patient´s condition, diagnoses and treatment plan based on the information available to me at this time. I have answered the patient's questions and addressed any concerns. The patient has a good  understanding of the patient´s diagnosis, condition, and treatment plan as can be expected at this point. The vital signs have been stable. The patient´s condition is stable and appropriate for discharge from the emergency department.      The patient will pursue further outpatient evaluation with the primary care physician or other designated or consulting physician as outlined in " the discharge instructions. They are agreeable to this plan of care and follow-up instructions have been explained in detail. The patient has received these instructions in written format and have expressed an understanding of the discharge instructions. The patient is aware that any significant change in condition or worsening of symptoms should prompt an immediate return to this or the closest emergency department or call to 911.         Amount and/or Complexity of Data Reviewed  Tests in the medicine section of CPT®: ordered and reviewed    Risk of Complications, Morbidity, and/or Mortality  Presenting problems: minimal  Management options: minimal    Patient Progress  Patient progress: stable       Final diagnoses:   Acute allergic serous otitis media of both ears        Disposition:  ED Disposition     ED Disposition Condition Comment    Discharge Stable            Gay Martin, APRN  02/19/22 0527

## 2022-02-19 NOTE — ED PROVIDER NOTES
"Patient is 26 y.o. year old female that presents to the ED for evaluation of muffled hearing.  She ahs no fever or headache.         ED Course:    /82 (BP Location: Left arm, Patient Position: Sitting)   Pulse 100   Temp 98 °F (36.7 °C) (Oral)   Resp 18   Ht 175.3 cm (69\")   Wt 91.6 kg (202 lb)   SpO2 98%   BMI 29.83 kg/m²   Results for orders placed or performed in visit on 10/14/21   ALT    Specimen: Blood   Result Value Ref Range    ALT (SGPT) 13 1 - 33 U/L   Valproic Acid Level, Total    Specimen: Blood   Result Value Ref Range    Valproic Acid 91.0 50.0 - 125.0 mcg/mL     Medications   predniSONE (DELTASONE) tablet 60 mg (60 mg Oral Given 2/19/22 0500)   cetirizine-pseudoephedrine (ZyrTEC-D) 5-120 MG per 12 hr tablet 1 tablet (1 tablet Oral Given 2/19/22 0502)   ondansetron ODT (ZOFRAN-ODT) disintegrating tablet 4 mg (4 mg Oral Given 2/19/22 0515)     No results found.    MDM:    Procedures      The case was discussed between the ANI and myself. Patient  care including, but not limited to ordered imaging, medications, and lab results were reviewed. I then performed the substantive portion of the visit including all aspects of the medical decision making.        Dennis Collins,   15:08 EST  02/19/22       Dennis Collins DO  02/19/22 1508    "

## 2022-03-23 NOTE — TELEPHONE ENCOUNTER
OB patient called stating she was having lots of pain on her left side. She is pregnant with an CAN of 8/21/20. Patient had ultrasound at 15 weeks which showed 2 left ovarian cysts one measuring 9 x 6 x 8, the other measuring 8 x 6 x 7. Per verbal order from Dr Lopes she needs to take 1,000 mg of Tylenol and see if that helps. Patient states she took 1,000 mg of Tylenol @ 10:00 am and it isn't helping. Per verbal order from Dr Lopes, he wanted patient to go to the ER.    no

## 2022-09-08 ENCOUNTER — OFFICE VISIT (OUTPATIENT)
Dept: FAMILY MEDICINE CLINIC | Facility: CLINIC | Age: 27
End: 2022-09-08

## 2022-09-08 VITALS
OXYGEN SATURATION: 99 % | SYSTOLIC BLOOD PRESSURE: 112 MMHG | WEIGHT: 193 LBS | DIASTOLIC BLOOD PRESSURE: 76 MMHG | HEART RATE: 83 BPM | TEMPERATURE: 98.4 F | BODY MASS INDEX: 28.58 KG/M2 | HEIGHT: 69 IN

## 2022-09-08 DIAGNOSIS — E28.2 PCOS (POLYCYSTIC OVARIAN SYNDROME): ICD-10-CM

## 2022-09-08 DIAGNOSIS — L02.429 BOIL OF LOWER EXTREMITY: Primary | ICD-10-CM

## 2022-09-08 PROCEDURE — 99213 OFFICE O/P EST LOW 20 MIN: CPT | Performed by: FAMILY MEDICINE

## 2022-09-08 RX ORDER — SULFAMETHOXAZOLE AND TRIMETHOPRIM 800; 160 MG/1; MG/1
1 TABLET ORAL 2 TIMES DAILY
COMMUNITY
Start: 2022-09-05 | End: 2023-01-10

## 2022-09-08 NOTE — PROGRESS NOTES
" Subjective   Dilia Olivas is a 27 y.o. female.     Chief Complaint   Patient presents with   • Leg Swelling             History of Present Illness     3 DAYS, place right lateral proximal lower leg, red, swollen, draining purulent material when squeezed.  She has been on 3 days of a 10 day course of bactrim.   Also  She wants her metformin refilled.   Also  She is no longer on her remeron or depakote and says she is doing ok.      Review of Systems   Constitutional: Negative for chills, fatigue and fever.   HENT: Negative for congestion, ear discharge, ear pain, facial swelling, hearing loss, postnasal drip, rhinorrhea, sinus pressure, sore throat, trouble swallowing and voice change.    Eyes: Negative for discharge, redness and visual disturbance.   Respiratory: Negative for cough, chest tightness, shortness of breath and wheezing.    Cardiovascular: Negative for chest pain and palpitations.   Gastrointestinal: Negative for abdominal pain, blood in stool, constipation, diarrhea, nausea and vomiting.   Endocrine: Negative for polydipsia and polyuria.   Genitourinary: Negative for dysuria, flank pain, hematuria and urgency.   Musculoskeletal: Negative for arthralgias, back pain, joint swelling and myalgias.   Skin: Positive for wound. Negative for rash.   Neurological: Negative for dizziness, weakness, numbness and headaches.   Hematological: Negative for adenopathy.   Psychiatric/Behavioral: Negative for confusion and sleep disturbance. The patient is not nervous/anxious.            /76 (BP Location: Left arm, Patient Position: Sitting, Cuff Size: Adult)   Pulse 83   Temp 98.4 °F (36.9 °C) (Infrared)   Ht 175.3 cm (69.02\")   Wt 87.5 kg (193 lb)   SpO2 99%   BMI 28.49 kg/m²       Objective     Physical Exam  Vitals and nursing note reviewed.   Constitutional:       Appearance: Normal appearance. She is well-developed.   HENT:      Head: Normocephalic and atraumatic.      Right Ear: External ear " normal.      Left Ear: External ear normal.      Nose: Nose normal.   Eyes:      Extraocular Movements: Extraocular movements intact.      Conjunctiva/sclera: Conjunctivae normal.      Pupils: Pupils are equal, round, and reactive to light.   Pulmonary:      Effort: Pulmonary effort is normal.   Musculoskeletal:         General: Normal range of motion.      Cervical back: Normal range of motion.   Skin:     Comments: About 3 cm raised red papule.     Neurological:      General: No focal deficit present.      Mental Status: She is alert and oriented to person, place, and time.   Psychiatric:         Behavior: Behavior normal.         Thought Content: Thought content normal.         Judgment: Judgment normal.             PAST MEDICAL HISTORY     Past Medical History:   Diagnosis Date   • Acne vulgaris    • Allergic rhinitis    • Anxiety    • Asthma    • Attention deficit hyperactivity disorder    • Chlamydia    • Conductive hearing loss     bilateral     • Depression    • Dysfunction of eustachian tube    • Elbow fracture, left    • Encounter for routine gynecological examination    • Epistaxis    • Gonorrhea    • Hand pain     right contusion      • Headache    • Heart murmur    • Herpetic maría    • High risk sexual behavior    • History of respiratory therapy 09/21/2011    Nebulizer Treatment 99667 (1) - DANI Johnson   • Insulin resistance 1/3/2017   • Irregular periods    • Laceration of foot    • Malaise and fatigue    • Mallet finger    • Myopia    • Nausea and vomiting    • Otalgia    • Otitis media    • Pain in wrist    • Pediculosis capitis    • Polycystic ovary syndrome    • PONV (postoperative nausea and vomiting)    • Scoliosis    • Sprain of foot, left    • Syncope, near    • Upper respiratory infection    • Verruca plantaris     right great toe         PAST SURGICAL HISTORY     Past Surgical History:   Procedure Laterality Date   • ADENOIDECTOMY  04/01/2004   • CAUTERIZATION NASAL BLEEDERS  04/01/2004     CAUTERIZATION INNER NOSE 60012 (1)   • OTHER SURGICAL HISTORY  04/01/2004    INCISION OF EARDRUM GENERAL ANESTHETIC 98998 (3) - BILATERAL TUBE IMPLANTS   • OVARIAN CYST SURGERY Left     pt states at 5 months pregnant cyst, left ovary and left fallopian tube were removed surgically.    • TONSILLECTOMY  2001   • WISDOM TOOTH EXTRACTION        SOCIAL HISTORY     Social History     Socioeconomic History   • Marital status:    Tobacco Use   • Smoking status: Current Every Day Smoker     Packs/day: 0.25     Years: 8.00     Pack years: 2.00     Types: Cigarettes     Start date: 9/30/2014   • Smokeless tobacco: Never Used   • Tobacco comment: VAPE   Vaping Use   • Vaping Use: Every day   • Start date: 8/31/2017   • Substances: Nicotine, Flavoring   • Devices: Disposable   Substance and Sexual Activity   • Alcohol use: Not Currently   • Drug use: Yes     Types: Marijuana   • Sexual activity: Yes     Partners: Male     Comment: has not had a pap test       ALLERGIES   Patient has no known allergies.   MEDICATIONS     Current Outpatient Medications   Medication Sig Dispense Refill   • metFORMIN (Glucophage) 500 MG tablet Take 1 tablet by mouth 2 (Two) Times a Day With Meals. 60 tablet 11   • sulfamethoxazole-trimethoprim (BACTRIM DS,SEPTRA DS) 800-160 MG per tablet Take 1 tablet by mouth 2 (Two) Times a Day.     • cetirizine-pseudoephedrine (ZyrTEC-D) 5-120 MG per 12 hr tablet Take 1 tablet by mouth 2 (Two) Times a Day for 10 days. 20 tablet 0     No current facility-administered medications for this visit.        The following portions of the patient's history were reviewed and updated as appropriate: allergies, current medications, past family history, past medical history, past social history, past surgical history and problem list.        Assessment & Plan   Diagnoses and all orders for this visit:    1. Boil of lower extremity (Primary)    2. PCOS (polycystic ovarian syndrome)    Other orders  -     metFORMIN  (Glucophage) 500 MG tablet; Take 1 tablet by mouth 2 (Two) Times a Day With Meals.  Dispense: 60 tablet; Refill: 11      She had squeezed purulent material from area earlier today.  Unable to get any now. Area is more indurated and not fluctuant.     Continue bactrim, takes time to heal.     Refilled her metformin for  Her pcos.                  No follow-ups on file.                  This document has been electronically signed by Collin Johnson MD on September 8, 2022 16:19 CDT

## 2022-11-18 ENCOUNTER — OFFICE VISIT (OUTPATIENT)
Dept: FAMILY MEDICINE CLINIC | Facility: CLINIC | Age: 27
End: 2022-11-18

## 2022-11-18 VITALS
DIASTOLIC BLOOD PRESSURE: 66 MMHG | BODY MASS INDEX: 29.33 KG/M2 | TEMPERATURE: 97.8 F | OXYGEN SATURATION: 97 % | HEIGHT: 69 IN | WEIGHT: 198 LBS | HEART RATE: 80 BPM | SYSTOLIC BLOOD PRESSURE: 112 MMHG

## 2022-11-18 DIAGNOSIS — N89.8 VAGINAL ODOR: ICD-10-CM

## 2022-11-18 DIAGNOSIS — N89.8 VAGINAL ITCHING: Primary | ICD-10-CM

## 2022-11-18 PROCEDURE — 99213 OFFICE O/P EST LOW 20 MIN: CPT | Performed by: NURSE PRACTITIONER

## 2022-11-18 RX ORDER — METRONIDAZOLE 500 MG/1
500 TABLET ORAL 2 TIMES DAILY
Qty: 14 TABLET | Refills: 0 | Status: SHIPPED | OUTPATIENT
Start: 2022-11-18 | End: 2022-11-25

## 2022-11-18 RX ORDER — FLUCONAZOLE 150 MG/1
TABLET ORAL
Qty: 4 TABLET | Refills: 0 | Status: SHIPPED | OUTPATIENT
Start: 2022-11-18 | End: 2023-01-10

## 2022-11-18 RX ORDER — BUPRENORPHINE HYDROCHLORIDE AND NALOXONE HYDROCHLORIDE DIHYDRATE 8; 2 MG/1; MG/1
1 TABLET SUBLINGUAL DAILY
COMMUNITY
End: 2023-01-10 | Stop reason: ALTCHOICE

## 2022-11-18 NOTE — PROGRESS NOTES
"Subjective   Dilia Olivas is a 27 y.o. female. Vaginal pain    History of Present Illness   Patient presents today for vaginal pain. She says she developed symptoms a few days ago. Symptoms include vaginal burning and itching, vaginal discharge, and \"fishy\" odor. She denies any dysuria, fever, chills, or back pain. Denies any need for STD testing.     The following portions of the patient's history were reviewed and updated as appropriate: allergies, current medications, past family history, past medical history, past social history, past surgical history, and problem list.    Review of Systems   Constitutional: Negative for chills, fatigue and fever.   HENT: Negative for congestion, ear pain, rhinorrhea and sore throat.    Eyes: Negative for blurred vision, double vision and visual disturbance.   Respiratory: Negative for cough, chest tightness, shortness of breath and wheezing.    Cardiovascular: Negative for chest pain, palpitations and leg swelling.   Gastrointestinal: Negative for abdominal pain, diarrhea, nausea and vomiting.   Endocrine: Negative for cold intolerance and heat intolerance.   Genitourinary: Positive for vaginal discharge and vaginal pain. Negative for difficulty urinating, dysuria, frequency, hematuria, pelvic pain, pelvic pressure and vaginal bleeding.   Musculoskeletal: Negative for arthralgias, back pain, neck pain and neck stiffness.   Skin: Negative for dry skin, pallor, rash, skin lesions and wound.   Allergic/Immunologic: Negative for environmental allergies, food allergies and immunocompromised state.   Neurological: Negative for dizziness, syncope, weakness, light-headedness, headache and confusion.   Hematological: Negative for adenopathy. Does not bruise/bleed easily.   Psychiatric/Behavioral: Negative for self-injury, sleep disturbance, suicidal ideas, depressed mood and stress. The patient is not nervous/anxious.        Vitals:    11/18/22 1123   BP: 112/66   Pulse: 80 "   Temp: 97.8 °F (36.6 °C)   SpO2: 97%     Body mass index is 29.22 kg/m².    Objective   Physical Exam  Constitutional:       General: She is not in acute distress.     Appearance: She is well-developed. She is not ill-appearing or diaphoretic.   HENT:      Head: Normocephalic.   Eyes:      General: Lids are normal.      Conjunctiva/sclera: Conjunctivae normal.      Pupils: Pupils are equal, round, and reactive to light.   Musculoskeletal:         General: Normal range of motion.      Cervical back: Full passive range of motion without pain, normal range of motion and neck supple.   Skin:     General: Skin is warm and dry.      Coloration: Skin is not pale.   Neurological:      Mental Status: She is alert and oriented to person, place, and time.      Coordination: Coordination normal.      Gait: Gait normal.   Psychiatric:         Speech: Speech normal.         Behavior: Behavior normal. Behavior is cooperative.         Thought Content: Thought content normal.         Judgment: Judgment normal.           Assessment & Plan   Diagnoses and all orders for this visit:    1. Vaginal itching (Primary)  -     metroNIDAZOLE (Flagyl) 500 MG tablet; Take 1 tablet by mouth 2 (Two) Times a Day for 7 days.  Dispense: 14 tablet; Refill: 0  -     fluconazole (Diflucan) 150 MG tablet; Take 1 tablet at onset of symptoms and repeat in 3 days if still having symptoms.  Dispense: 4 tablet; Refill: 0    2. Vaginal odor  -     metroNIDAZOLE (Flagyl) 500 MG tablet; Take 1 tablet by mouth 2 (Two) Times a Day for 7 days.  Dispense: 14 tablet; Refill: 0  -     fluconazole (Diflucan) 150 MG tablet; Take 1 tablet at onset of symptoms and repeat in 3 days if still having symptoms.  Dispense: 4 tablet; Refill: 0    No lab available for vaginal swab. Would not want to make patient wait all weekend to get results on Monday.   Symptoms most likely from bacterial vaginosis and yeast infection.  Will cover for both.  Take antibiotic and diflucan as  directed.  Tried to do urinalysis, pt could not urinate and felt it wasn't necessary.  If symptoms do not improve or worsen, patient was instructed to return to clinic for further evaluation.         This document has been electronically signed by KIM Torres on  November 18, 2022 12:32 CST

## 2023-01-10 ENCOUNTER — OFFICE VISIT (OUTPATIENT)
Dept: FAMILY MEDICINE CLINIC | Facility: CLINIC | Age: 28
End: 2023-01-10
Payer: MEDICAID

## 2023-01-10 ENCOUNTER — LAB (OUTPATIENT)
Dept: LAB | Facility: HOSPITAL | Age: 28
End: 2023-01-10
Payer: MEDICAID

## 2023-01-10 VITALS
WEIGHT: 198.5 LBS | BODY MASS INDEX: 29.4 KG/M2 | HEART RATE: 105 BPM | TEMPERATURE: 99.8 F | SYSTOLIC BLOOD PRESSURE: 118 MMHG | DIASTOLIC BLOOD PRESSURE: 80 MMHG | OXYGEN SATURATION: 98 % | HEIGHT: 69 IN

## 2023-01-10 DIAGNOSIS — Z3A.01 LESS THAN 8 WEEKS GESTATION OF PREGNANCY: ICD-10-CM

## 2023-01-10 DIAGNOSIS — L03.011 PARONYCHIA OF FINGER, RIGHT: Primary | ICD-10-CM

## 2023-01-10 PROCEDURE — 99213 OFFICE O/P EST LOW 20 MIN: CPT | Performed by: FAMILY MEDICINE

## 2023-01-10 PROCEDURE — 84702 CHORIONIC GONADOTROPIN TEST: CPT | Performed by: FAMILY MEDICINE

## 2023-01-10 PROCEDURE — 10060 I&D ABSCESS SIMPLE/SINGLE: CPT | Performed by: FAMILY MEDICINE

## 2023-01-10 RX ORDER — NALOXONE HYDROCHLORIDE 4 MG/.1ML
SPRAY NASAL
COMMUNITY
Start: 2022-10-07 | End: 2023-01-10

## 2023-01-10 RX ORDER — CEPHALEXIN 250 MG/1
250 CAPSULE ORAL 4 TIMES DAILY
Qty: 28 CAPSULE | Refills: 0 | Status: SHIPPED | OUTPATIENT
Start: 2023-01-10

## 2023-01-10 RX ORDER — BUPRENORPHINE AND NALOXONE 8; 2 MG/1; MG/1
FILM, SOLUBLE BUCCAL; SUBLINGUAL
COMMUNITY
Start: 2023-01-05

## 2023-01-10 NOTE — PROGRESS NOTES
Subjective   Dilia Olivas is a 27 y.o. female.     Chief Complaint   Patient presents with   • INFECTED FINGER             History of Present Illness     Distal 3rd finger right hand, inflammed and pointing.  Has been bothering her for about a week.   Also  Positive urine pregnancy test 12/3/22.   On 1/3/23, started bledding during exercise class and proceeded to pass a large clot and continued to bleed for 4 days.   She wants a blood test to see if she is still pregnant.     Review of Systems   Constitutional: Negative for chills, fatigue and fever.   HENT: Negative for congestion, ear discharge, ear pain, facial swelling, hearing loss, postnasal drip, rhinorrhea, sinus pressure, sore throat, trouble swallowing and voice change.    Eyes: Negative for discharge, redness and visual disturbance.   Respiratory: Negative for cough, chest tightness, shortness of breath and wheezing.    Cardiovascular: Negative for chest pain and palpitations.   Gastrointestinal: Negative for abdominal pain, blood in stool, constipation, diarrhea, nausea and vomiting.   Endocrine: Negative for polydipsia and polyuria.   Genitourinary: Negative for dysuria, flank pain, hematuria and urgency.   Musculoskeletal: Negative for arthralgias, back pain, joint swelling and myalgias.   Skin: Negative for rash.   Neurological: Negative for dizziness, weakness, numbness and headaches.   Hematological: Negative for adenopathy.   Psychiatric/Behavioral: Negative for confusion and sleep disturbance. The patient is not nervous/anxious.            /80 (BP Location: Left arm, Patient Position: Sitting, Cuff Size: Adult)   Pulse 105   Temp 99.8 °F (37.7 °C) (Infrared)   Ht 175.3 cm (69.02\")   Wt 90 kg (198 lb 8 oz)   SpO2 98%   BMI 29.30 kg/m²       Objective     Physical Exam  Vitals and nursing note reviewed.   Constitutional:       Appearance: Normal appearance. She is well-developed.   HENT:      Head: Normocephalic and atraumatic.       Right Ear: External ear normal.      Left Ear: External ear normal.      Nose: Nose normal.   Eyes:      Extraocular Movements: Extraocular movements intact.      Conjunctiva/sclera: Conjunctivae normal.      Pupils: Pupils are equal, round, and reactive to light.   Pulmonary:      Effort: Pulmonary effort is normal.   Musculoskeletal:         General: Normal range of motion.      Cervical back: Normal range of motion.   Skin:     Comments: Distal 3rd finger right hand swollen and pointing.    Neurological:      General: No focal deficit present.      Mental Status: She is alert and oriented to person, place, and time.   Psychiatric:         Behavior: Behavior normal.         Thought Content: Thought content normal.         Judgment: Judgment normal.             PAST MEDICAL HISTORY     Past Medical History:   Diagnosis Date   • Acne vulgaris    • Allergic rhinitis    • Anxiety    • Asthma    • Attention deficit hyperactivity disorder    • Chlamydia    • Conductive hearing loss     bilateral     • Depression    • Dysfunction of eustachian tube    • Elbow fracture, left    • Encounter for routine gynecological examination    • Epistaxis    • Gonorrhea    • Hand pain     right contusion      • Headache    • Heart murmur    • Herpetic maría    • High risk sexual behavior    • History of respiratory therapy 09/21/2011    Nebulizer Treatment 50773 (1) - DANI Johnson   • Insulin resistance 1/3/2017   • Irregular periods    • Laceration of foot    • Malaise and fatigue    • Mallet finger    • Myopia    • Nausea and vomiting    • Otalgia    • Otitis media    • Pain in wrist    • Pediculosis capitis    • Polycystic ovary syndrome    • PONV (postoperative nausea and vomiting)    • Scoliosis    • Sprain of foot, left    • Syncope, near    • Upper respiratory infection    • Verruca plantaris     right great toe         PAST SURGICAL HISTORY     Past Surgical History:   Procedure Laterality Date   • ADENOIDECTOMY  04/01/2004    • CAUTERIZATION NASAL BLEEDERS  04/01/2004    CAUTERIZATION INNER NOSE 91733 (1)   • OTHER SURGICAL HISTORY  04/01/2004    INCISION OF EARDRUM GENERAL ANESTHETIC 70335 (3) - BILATERAL TUBE IMPLANTS   • OVARIAN CYST SURGERY Left     pt states at 5 months pregnant cyst, left ovary and left fallopian tube were removed surgically.    • TONSILLECTOMY  2001   • WISDOM TOOTH EXTRACTION        SOCIAL HISTORY     Social History     Socioeconomic History   • Marital status:    Tobacco Use   • Smoking status: Every Day     Packs/day: 0.25     Years: 8.00     Pack years: 2.00     Types: Cigarettes     Start date: 9/30/2014   • Smokeless tobacco: Never   • Tobacco comments:     VAPE   Vaping Use   • Vaping Use: Every day   • Start date: 8/31/2017   • Substances: Nicotine, Flavoring   • Devices: Disposable   Substance and Sexual Activity   • Alcohol use: Not Currently   • Drug use: Not Currently     Types: Marijuana   • Sexual activity: Yes     Partners: Male     Comment: has not had a pap test       ALLERGIES   Patient has no known allergies.   MEDICATIONS     Current Outpatient Medications   Medication Sig Dispense Refill   • buprenorphine-naloxone (SUBOXONE) 8-2 MG film film      • metFORMIN (Glucophage) 500 MG tablet Take 1 tablet by mouth 2 (Two) Times a Day With Meals. 60 tablet 11   • cephalexin (Keflex) 250 MG capsule Take 1 capsule by mouth 4 (Four) Times a Day. 28 capsule 0   • cetirizine-pseudoephedrine (ZyrTEC-D) 5-120 MG per 12 hr tablet Take 1 tablet by mouth 2 (Two) Times a Day for 10 days. 20 tablet 0     No current facility-administered medications for this visit.        The following portions of the patient's history were reviewed and updated as appropriate: allergies, current medications, past family history, past medical history, past social history, past surgical history and problem list.        Assessment & Plan   Diagnoses and all orders for this visit:    1. Paronychia of finger, right  (Primary)    2. Less than 8 weeks gestation of pregnancy  -     hCG, Quantitative, Pregnancy    Other orders  -     cephalexin (Keflex) 250 MG capsule; Take 1 capsule by mouth 4 (Four) Times a Day.  Dispense: 28 capsule; Refill: 0    I lanced the paronychia with sterile 11 blade, expressed purulence.   Keflex.     I told her I suspect she lost her pregnancy.   Will get above test and recheck in a week.     Will call her with result                    No follow-ups on file.                  This document has been electronically signed by Collin Johnson MD on January 10, 2023 16:48 CST

## 2023-01-11 DIAGNOSIS — O03.9 SPONTANEOUS MISCARRIAGE: Primary | ICD-10-CM

## 2023-01-11 LAB — HCG INTACT+B SERPL-ACNC: <1 MIU/ML

## 2023-01-16 ENCOUNTER — OFFICE VISIT (OUTPATIENT)
Dept: OBSTETRICS AND GYNECOLOGY | Facility: CLINIC | Age: 28
End: 2023-01-16
Payer: MEDICAID

## 2023-01-16 VITALS
SYSTOLIC BLOOD PRESSURE: 118 MMHG | DIASTOLIC BLOOD PRESSURE: 60 MMHG | BODY MASS INDEX: 29.33 KG/M2 | HEIGHT: 69 IN | WEIGHT: 198 LBS

## 2023-01-16 DIAGNOSIS — O03.9 FOLLOW-UP VISIT AFTER MISCARRIAGE: Primary | ICD-10-CM

## 2023-01-16 DIAGNOSIS — Z51.89 FOLLOW-UP VISIT AFTER MISCARRIAGE: Primary | ICD-10-CM

## 2023-01-16 PROCEDURE — 99212 OFFICE O/P EST SF 10 MIN: CPT

## 2023-01-16 NOTE — PROGRESS NOTES
Subjective   Dilia Olivas is a 27 y.o.     History of Present Illness  LMP: Unknown  BC: Nexplanon placed 1/21/21    Patient is seen today for a miscarriage follow up. Reports that she had positive home pregnancy test at home on separate days. The first positive was on Dec 3rd. On Jan. 5th she was doing rosanna and experienced vaginal bleeding running down her leg. She proceeded to pass a blood clot in the toilet and bleed for an additional 3 days. Aprox. 2 weeks later she seen her PCP and a Quant was drawn. The Quant was <1. Denies any vaginal bleeding or abdominal cramping at this time. Reports that she is [place].   Patient contemplated having the Nexplanon removed, however she decided against it because she does not want to get pregnant again.       Miscarriage  This is a new problem. The current episode started 1 to 4 weeks ago. The problem occurs rarely. Pertinent negatives include no abdominal pain, chest pain, chills, coughing, fatigue, fever, headaches, nausea, urinary symptoms or vomiting.       The following portions of the patient's history were reviewed and updated as appropriate: allergies, current medications, past family history, past medical history, past social history, past surgical history and problem list.    Review of Systems   Constitutional: Negative for appetite change, chills, fatigue and fever.   Respiratory: Negative for apnea, cough, choking, chest tightness, shortness of breath, wheezing and stridor.    Cardiovascular: Negative for chest pain, palpitations and leg swelling.   Gastrointestinal: Negative for abdominal pain, constipation, diarrhea, nausea and vomiting.   Genitourinary: Negative for amenorrhea, breast discharge, breast lump, breast pain, decreased libido, decreased urine volume, difficulty urinating, dyspareunia, dysuria, flank pain, frequency, genital sores, hematuria, menstrual problem, pelvic pain, pelvic pressure, urgency, urinary incontinence, vaginal bleeding,  vaginal discharge and vaginal pain.       Objective   Physical Exam  Vitals reviewed.   Constitutional:       General: She is awake. She is not in acute distress.     Appearance: Normal appearance. She is well-developed and normal weight. She is not ill-appearing, toxic-appearing or diaphoretic.   Cardiovascular:      Rate and Rhythm: Normal rate and regular rhythm.      Pulses: Normal pulses.      Heart sounds: Normal heart sounds.   Pulmonary:      Effort: Pulmonary effort is normal.      Breath sounds: Normal breath sounds.   Abdominal:      General: Bowel sounds are normal.   Skin:     General: Skin is warm and dry.   Neurological:      Mental Status: She is alert and easily aroused.   Psychiatric:         Behavior: Behavior is cooperative.           Assessment & Plan   Diagnoses and all orders for this visit:    1. Follow-up visit after miscarriage (Primary)      This was possibly her menstrual bleeding. Irregular bleeding is consistent with Nexplanon. No further follow up needed.         This document has been electronically signed by KIM Vazquez on January 16, 2023 15:08 CST

## 2024-12-31 NOTE — DISCHARGE INSTR - ACTIVITY
Return if vaginal bleeding, think water has broken, decreased fetal movements, or strong regular contractions. Drink plenty of water and keep all scheduled Dr appointments.    Writer called receiving facility to let them know the pt was discharged and enroute back to facility.